# Patient Record
Sex: FEMALE | Race: WHITE | Employment: OTHER | URBAN - METROPOLITAN AREA
[De-identification: names, ages, dates, MRNs, and addresses within clinical notes are randomized per-mention and may not be internally consistent; named-entity substitution may affect disease eponyms.]

---

## 2017-04-11 ENCOUNTER — HOSPITAL ENCOUNTER (OUTPATIENT)
Dept: MRI IMAGING | Age: 72
Discharge: HOME | End: 2017-04-11
Attending: ORTHOPAEDIC SURGERY
Payer: MEDICARE

## 2017-04-11 DIAGNOSIS — M53.3 SACROILIAC PAIN: ICD-10-CM

## 2017-04-11 PROCEDURE — 72195 MRI PELVIS W/O DYE: CPT

## 2020-08-26 DIAGNOSIS — G90.09 IDIOPATHIC PERIPHERAL AUTONOMIC NEUROPATHY: Primary | ICD-10-CM

## 2020-08-26 RX ORDER — GABAPENTIN 800 MG/1
800 TABLET ORAL
COMMUNITY
End: 2020-08-26 | Stop reason: SDUPTHER

## 2020-08-27 RX ORDER — GABAPENTIN 800 MG/1
800 TABLET ORAL 3 TIMES DAILY
Qty: 270 TAB | Refills: 0 | Status: SHIPPED | OUTPATIENT
Start: 2020-08-27 | End: 2020-12-07

## 2020-11-18 VITALS
OXYGEN SATURATION: 96 % | TEMPERATURE: 96.8 F | SYSTOLIC BLOOD PRESSURE: 140 MMHG | DIASTOLIC BLOOD PRESSURE: 68 MMHG | WEIGHT: 173 LBS | HEIGHT: 64 IN | HEART RATE: 80 BPM | BODY MASS INDEX: 29.53 KG/M2

## 2020-11-18 PROBLEM — G90.09 IDIOPATHIC PERIPHERAL AUTONOMIC NEUROPATHY: Status: ACTIVE | Noted: 2020-11-18

## 2020-11-18 PROBLEM — M17.9 OSTEOARTHRITIS OF KNEE: Status: ACTIVE | Noted: 2020-11-18

## 2020-11-18 PROBLEM — I10 HYPERTENSIVE DISORDER: Status: ACTIVE | Noted: 2020-11-18

## 2020-11-18 PROBLEM — E78.1 HYPERTRIGLYCERIDEMIA: Status: ACTIVE | Noted: 2020-11-18

## 2020-11-18 PROBLEM — L40.9 PSORIASIS: Status: ACTIVE | Noted: 2020-11-18

## 2020-11-18 PROBLEM — Z95.4 HISTORY OF AORTIC VALVE REPLACEMENT WITH TISSUE GRAFT: Status: ACTIVE | Noted: 2020-11-18

## 2020-11-18 RX ORDER — FUROSEMIDE 20 MG/1
TABLET ORAL DAILY
COMMUNITY
End: 2021-09-20 | Stop reason: ALTCHOICE

## 2020-11-18 RX ORDER — PYRIDOXINE HCL (VITAMIN B6) 100 MG
100 TABLET ORAL DAILY
COMMUNITY

## 2020-11-18 RX ORDER — BISMUTH SUBSALICYLATE 262 MG
1 TABLET,CHEWABLE ORAL DAILY
COMMUNITY

## 2020-11-18 RX ORDER — GLUCOSAMINE SULFATE 1500 MG
POWDER IN PACKET (EA) ORAL DAILY
COMMUNITY
End: 2021-09-16 | Stop reason: ALTCHOICE

## 2020-11-18 RX ORDER — POTASSIUM CHLORIDE 20 MEQ/1
TABLET, EXTENDED RELEASE ORAL 2 TIMES DAILY
COMMUNITY
End: 2021-08-10

## 2020-11-18 RX ORDER — ATORVASTATIN CALCIUM 40 MG/1
40 TABLET, FILM COATED ORAL EVERY EVENING
COMMUNITY

## 2020-11-18 RX ORDER — FUROSEMIDE 40 MG/1
TABLET ORAL DAILY
COMMUNITY
End: 2021-01-13

## 2020-11-18 RX ORDER — DOXYCYCLINE 100 MG/1
100 CAPSULE ORAL 2 TIMES DAILY
COMMUNITY
End: 2021-01-13

## 2020-11-18 RX ORDER — RAMIPRIL 5 MG/1
CAPSULE ORAL DAILY
COMMUNITY
End: 2021-09-20 | Stop reason: ALTCHOICE

## 2020-11-18 RX ORDER — ALBUTEROL SULFATE 90 UG/1
AEROSOL, METERED RESPIRATORY (INHALATION)
COMMUNITY
End: 2021-08-10

## 2020-11-18 RX ORDER — TRIAMCINOLONE ACETONIDE 40 MG/ML
INJECTION, SUSPENSION INTRA-ARTICULAR; INTRAMUSCULAR ONCE
COMMUNITY
End: 2021-01-13

## 2020-11-18 RX ORDER — CARVEDILOL 25 MG/1
12.5 TABLET ORAL 2 TIMES DAILY WITH MEALS
COMMUNITY

## 2020-11-18 RX ORDER — MONTELUKAST SODIUM 4 MG/1
1 TABLET, CHEWABLE ORAL 2 TIMES DAILY
COMMUNITY
End: 2021-01-13

## 2020-11-18 RX ORDER — CLOPIDOGREL BISULFATE 75 MG/1
TABLET ORAL
COMMUNITY
End: 2021-01-13

## 2020-11-18 RX ORDER — GLUCOSAM/CHONDRO/HERB 149/HYAL 750-100 MG
1 TABLET ORAL DAILY
COMMUNITY
End: 2021-08-10

## 2021-01-11 ENCOUNTER — TELEPHONE (OUTPATIENT)
Dept: FAMILY MEDICINE CLINIC | Age: 76
End: 2021-01-11

## 2021-01-11 DIAGNOSIS — G90.09 IDIOPATHIC PERIPHERAL AUTONOMIC NEUROPATHY: ICD-10-CM

## 2021-01-11 NOTE — TELEPHONE ENCOUNTER
Pt phoned stating that she needs a refill on: 
1) gabapentin *pt has an appt w/ [facility] on 1/1202021 but she ran out of this medication on 1/10/2021*

## 2021-01-12 ENCOUNTER — TELEPHONE (OUTPATIENT)
Dept: FAMILY MEDICINE CLINIC | Age: 76
End: 2021-01-12

## 2021-01-12 RX ORDER — GABAPENTIN 800 MG/1
TABLET ORAL
Qty: 90 TAB | Refills: 0 | Status: SHIPPED | OUTPATIENT
Start: 2021-01-12 | End: 2021-02-09 | Stop reason: SDUPTHER

## 2021-01-12 NOTE — TELEPHONE ENCOUNTER
She's scheduled to see you tomorrow.  She's out of the stacie[emtom could that be called in today please

## 2021-01-13 ENCOUNTER — OFFICE VISIT (OUTPATIENT)
Dept: FAMILY MEDICINE CLINIC | Age: 76
End: 2021-01-13
Payer: MEDICARE

## 2021-01-13 VITALS
DIASTOLIC BLOOD PRESSURE: 80 MMHG | WEIGHT: 174.38 LBS | BODY MASS INDEX: 29.05 KG/M2 | HEIGHT: 65 IN | HEART RATE: 78 BPM | SYSTOLIC BLOOD PRESSURE: 140 MMHG | TEMPERATURE: 96.9 F | OXYGEN SATURATION: 96 %

## 2021-01-13 DIAGNOSIS — E66.3 OVERWEIGHT WITH BODY MASS INDEX (BMI) OF 29 TO 29.9 IN ADULT: ICD-10-CM

## 2021-01-13 DIAGNOSIS — Z23 ENCOUNTER FOR IMMUNIZATION: ICD-10-CM

## 2021-01-13 DIAGNOSIS — F17.210 CIGARETTE NICOTINE DEPENDENCE WITHOUT COMPLICATION: ICD-10-CM

## 2021-01-13 DIAGNOSIS — Z13.31 DEPRESSION SCREENING: ICD-10-CM

## 2021-01-13 DIAGNOSIS — Z53.20 PROCEDURE REFUSED: ICD-10-CM

## 2021-01-13 DIAGNOSIS — Z91.81 AT HIGH RISK FOR FALLS: ICD-10-CM

## 2021-01-13 DIAGNOSIS — A08.4 VIRAL GASTROENTERITIS: ICD-10-CM

## 2021-01-13 DIAGNOSIS — E78.2 MIXED HYPERLIPIDEMIA: ICD-10-CM

## 2021-01-13 DIAGNOSIS — G90.09 IDIOPATHIC PERIPHERAL AUTONOMIC NEUROPATHY: ICD-10-CM

## 2021-01-13 DIAGNOSIS — Z00.00 MEDICARE ANNUAL WELLNESS VISIT, SUBSEQUENT: Primary | ICD-10-CM

## 2021-01-13 DIAGNOSIS — R01.1 MURMUR: ICD-10-CM

## 2021-01-13 DIAGNOSIS — Z13.39 ALCOHOL SCREENING: ICD-10-CM

## 2021-01-13 PROCEDURE — G8427 DOCREV CUR MEDS BY ELIG CLIN: HCPCS | Performed by: NURSE PRACTITIONER

## 2021-01-13 PROCEDURE — G8754 DIAS BP LESS 90: HCPCS | Performed by: NURSE PRACTITIONER

## 2021-01-13 PROCEDURE — G8400 PT W/DXA NO RESULTS DOC: HCPCS | Performed by: NURSE PRACTITIONER

## 2021-01-13 PROCEDURE — 99214 OFFICE O/P EST MOD 30 MIN: CPT | Performed by: NURSE PRACTITIONER

## 2021-01-13 PROCEDURE — G8419 CALC BMI OUT NRM PARAM NOF/U: HCPCS | Performed by: NURSE PRACTITIONER

## 2021-01-13 PROCEDURE — G8753 SYS BP > OR = 140: HCPCS | Performed by: NURSE PRACTITIONER

## 2021-01-13 PROCEDURE — G8536 NO DOC ELDER MAL SCRN: HCPCS | Performed by: NURSE PRACTITIONER

## 2021-01-13 PROCEDURE — G8432 DEP SCR NOT DOC, RNG: HCPCS | Performed by: NURSE PRACTITIONER

## 2021-01-13 PROCEDURE — G0439 PPPS, SUBSEQ VISIT: HCPCS | Performed by: NURSE PRACTITIONER

## 2021-01-13 PROCEDURE — 1101F PT FALLS ASSESS-DOCD LE1/YR: CPT | Performed by: NURSE PRACTITIONER

## 2021-01-13 PROCEDURE — 1090F PRES/ABSN URINE INCON ASSESS: CPT | Performed by: NURSE PRACTITIONER

## 2021-01-13 NOTE — PATIENT INSTRUCTIONS
Medicare Wellness Visit, Female The best way to live healthy is to have a lifestyle where you eat a well-balanced diet, exercise regularly, limit alcohol use, and quit all forms of tobacco/nicotine, if applicable. Regular preventive services are another way to keep healthy. Preventive services (vaccines, screening tests, monitoring & exams) can help personalize your care plan, which helps you manage your own care. Screening tests can find health problems at the earliest stages, when they are easiest to treat. [facility] follows the current, evidence-based guidelines published by the [facility] ([facility]) when recommending preventive services for our patients. Because we follow these guidelines, sometimes recommendations change over time as research supports it. (For example, mammograms used to be recommended annually. Even though Medicare will still pay for an annual mammogram, the newer guidelines recommend a mammogram every two years for women of average risk). Of course, you and your doctor may decide to screen more often for some diseases, based on your risk and your co-morbidities (chronic disease you are already diagnosed with). Preventive services for you include: - Medicare offers their members a free annual wellness visit, which is time for you and your primary care provider to discuss and plan for your preventive service needs. Take advantage of this benefit every year! 
-All adults over the age of 72 should receive the recommended pneumonia vaccines. Current USPSTF guidelines recommend a series of two vaccines for the best pneumonia protection.  
-All adults should have a flu vaccine yearly and a tetanus vaccine every 10 years.  
-All adults age 48 and older should receive the shingles vaccines (series of two vaccines). -All adults age 38-68 who are overweight should have a diabetes screening test once every three years. -All adults born between [birth year] and 1965 should be screened once for Hepatitis C. 
-Other screening tests and preventive services for persons with diabetes include: an eye exam to screen for diabetic retinopathy, a kidney function test, a foot exam, and stricter control over your cholesterol.  
-Cardiovascular screening for adults with routine risk involves an electrocardiogram (ECG) at intervals determined by your doctor.  
-Colorectal cancer screenings should be done for adults age 54-65 with no increased risk factors for colorectal cancer. There are a number of acceptable methods of screening for this type of cancer. Each test has its own benefits and drawbacks. Discuss with your doctor what is most appropriate for you during your annual wellness visit. The different tests include: colonoscopy (considered the best screening method), a fecal occult blood test, a fecal DNA test, and sigmoidoscopy. 
 
-A bone mass density test is recommended when a woman turns 65 to screen for osteoporosis. This test is only recommended one time, as a screening. Some providers will use this same test as a disease monitoring tool if you already have osteoporosis. -Breast cancer screenings are recommended every other year for women of normal risk, age 54-69. 
-Cervical cancer screenings for women over age 72 are only recommended with certain risk factors. Here is a list of your current Health Maintenance items (your personalized list of preventive services) with a due date: 
Health Maintenance Due Topic Date Due  Cholesterol Test   01/25/1955  DTaP/Tdap/Td  (1 - Tdap) 01/25/1965  Shingles Vaccine (1 of 2) 01/25/1994  Glaucoma Screening   01/25/2009  Bone Mineral Density   01/25/2009 [place] Self Annual Well Visit  04/20/2020

## 2021-01-13 NOTE — PROGRESS NOTES
Medicare Wellness Exam:    Chief Complaint   Patient presents with    Annual Wellness Visit     she is a 67y.o. year old female who presents for evaluation for their Medicare Wellness Visit. Patient presents for Medicare wellness, fasting labs, and management of hyperlipidemia and neuropathy. She also has an acute complaint today. Medications reviewed, taking as prescribed with no known side effects. Follows with cardiology for management of hypertension. Atorvastatin daily in am for hyperlipidemia. Limits fat/cholesterol. Does not exercise regularly, limited due to neuropathy. Neuropathy is well controlled with Gabapentin daily. Reports two falls in the past year. One occurred when she tripped over a strap on her cane. One occurred when her legs fell from under her. Uses cane for ambulation. Continues to smoke 3-4 cigarettes per day. Motivated to quit on her own. She has been successful in cutting back over the past year. Patient presents with complaint of diarrhea daily for the past 4-5 days. Stools are described as loose, not watery. Denies fever, rash, and respiratory symptoms. Denies bloating, abdominal pain, and blood/mucous in stool. Evaluation to date: none  Treatment to date: Imodium is not helping  Relevant PMH: No pertinent additional PMH. Fall Screen is completed and assessed=yes  Depression Screen is completed and assessed=yes  Medication list reviewed and adjusted for accuracy=yes  Immunizations reviewed and updated=yes  Health/Preventative Screenings reviewed and updated=yes  ADL Functions reviewed=yes  MiniCog score= 4/5 (2points for clock, 3/3 for recall)  See scanned medicare wellness documents for full details.      Patient Active Problem List    Diagnosis    Aortic heart murmur    History of aortic valve replacement with tissue graft    Hypertensive disorder    Hypertriglyceridemia    Idiopathic peripheral autonomic neuropathy    Osteoarthritis of knee    Psoriasis Reviewed PmHx, RxHx, FmHx, SocHx, AllgHx and updated and dated in the chart. Review of Systems   Constitutional: Negative for chills, fever and weight loss. HENT: Negative for congestion, ear pain, hearing loss, sinus pain and sore throat. Denies difficulty swallowing. Eyes: Negative for blurred vision. Respiratory: Negative for cough, shortness of breath and wheezing. Cardiovascular: Positive for leg swelling (Lasix prn). Negative for chest pain, palpitations and claudication. Gastrointestinal: Positive for diarrhea. Negative for abdominal pain, constipation and heartburn. Genitourinary: Negative for dysuria. Musculoskeletal: Positive for joint pain. Negative for myalgias. Neurological: Positive for tingling (peripheral neuropathy). Negative for dizziness, weakness and headaches. Psychiatric/Behavioral: Negative for depression. The patient is not nervous/anxious. Objective:     Vitals:    01/13/21 1026   BP: (!) 140/80   Pulse: 78   Temp: 96.9 °F (36.1 °C)   TempSrc: Temporal   SpO2: 96%   Weight: 174 lb 6 oz (79.1 kg)   Height: 5' 5\" (1.651 m)     Physical Exam  Vitals signs and nursing note reviewed. Constitutional:       General: She is not in acute distress. Appearance: Normal appearance. She is overweight. HENT:      Head: Normocephalic. Eyes:      Extraocular Movements: Extraocular movements intact. Neck:      Musculoskeletal: Neck supple. Thyroid: No thyroid mass, thyromegaly or thyroid tenderness. Cardiovascular:      Rate and Rhythm: Normal rate and regular rhythm. Heart sounds: Murmur present. Pulmonary:      Effort: Pulmonary effort is normal.      Breath sounds: Normal breath sounds. Abdominal:      General: Bowel sounds are normal. There is no distension. Palpations: Abdomen is soft. Tenderness: There is no abdominal tenderness. Musculoskeletal: Normal range of motion. Right lower leg: No edema. Left lower leg: No edema. Lymphadenopathy:      Cervical: No cervical adenopathy. Upper Body:      Right upper body: No supraclavicular adenopathy. Left upper body: No supraclavicular adenopathy. Skin:     General: Skin is warm and dry. Neurological:      Mental Status: She is alert and oriented to person, place, and time. Psychiatric:         Mood and Affect: Mood normal.         Behavior: Behavior normal.          Assessment/ Plan:   Diagnoses and all orders for this visit:    1. Medicare annual wellness visit, subsequent  [facility] exam completed as documented. 2. Depression screening  Negative depression screening. 3. Alcohol screening  Low risk for alcohol misuse. 4. At high risk for falls  Ambulates with cane. Declines PT and home health referrals. 5. Encounter for immunization  Advised to receive Shingrix vaccine from pharmacy and cautioned there may be an out-of-pocket expense. 6. Overweight with body mass index (BMI) of 29 to 29.9 in adult  Increase exercise as tolerated, avoid sitting for prolonged. Of time. Eat a healthy diet. 7. Cigarette nicotine dependence without complication  Praised for successfully cutting back on cigarette use. Call if we can be of further assistance. 8. Procedure refused  Declines further mammogram, bone density, and colonoscopy screening. 9. Mixed hyperlipidemia  Checking annual labs as noted. Take statin daily in the evening. Increase exercise as tolerated and continue to limit fat and cholesterol in diet. -     CBC WITH AUTOMATED DIFF  -     LIPID PANEL  -     METABOLIC PANEL, COMPREHENSIVE    10. Idiopathic peripheral autonomic neuropathy   checked, no misuse or abuse noted. Last refilled #90 on 12/7/2020. Well controlled with gabapentin daily. 11. Viral gastroenteritis  Likely viral in nature at this point. Recommend bland diet and plenty of fluids. Okay to take Imodium as needed.   Call if symptoms persist beyond 10 to 14 days. 15. Murmur  Follows with cardiology following aortic valve replacement.       -Pain evaluation performed in office  -Cognitive Screen performed in office  -Depression Screen, Fall risks (by up and go test)  and ADL functionality were addressed  -Medication list updated and reviewed for any changes   -A comprehensive review of medical issues and a plan was formulated  -End of life planning was addressed with pt   -Health Screenings for preventions were addressed and a plan was formulated  -Shingles Vaccine was recommended  -Discussed with patient cancer risk factors and appropriate screenings for age  -Patient evaluated for colonoscopy and referred if needed per screeing criteria  -Labs from previous visits were discussed with patient   -Discussed with patient diet and exercise and formulated a plan as needed  -An Advanced care plan was developed with the patient.  -Alcohol screening performed and was negative    -  Follow-up and Dispositions    · Return in about 6 months (around 7/13/2021) for follow up neuropathy, nonfasting, VV ok. I have discussed the diagnosis with the patient and the intended plan as seen in the above orders. The patient understands and agrees with the plan. The patient has received an after-visit summary and questions were answered concerning future plans.      Medication Side Effects and Warnings were discussed with patien  Patient Labs were reviewed and or requested  Patient Past Records were reviewed and or requested        VODECLIC FNP-C

## 2021-01-14 LAB
ALBUMIN SERPL-MCNC: 3.8 G/DL (ref 3.7–4.7)
ALBUMIN/GLOB SERPL: 1.6 {RATIO} (ref 1.2–2.2)
ALP SERPL-CCNC: 54 IU/L (ref 39–117)
ALT SERPL-CCNC: 16 IU/L (ref 0–32)
AST SERPL-CCNC: 26 IU/L (ref 0–40)
BASOPHILS # BLD AUTO: 0 X10E3/UL (ref 0–0.2)
BASOPHILS NFR BLD AUTO: 0 %
BILIRUB SERPL-MCNC: 0.4 MG/DL (ref 0–1.2)
BUN SERPL-MCNC: 11 MG/DL (ref 8–27)
BUN/CREAT SERPL: 16 (ref 12–28)
CALCIUM SERPL-MCNC: 8.6 MG/DL (ref 8.7–10.3)
CHLORIDE SERPL-SCNC: 100 MMOL/L (ref 96–106)
CHOLEST SERPL-MCNC: 156 MG/DL (ref 100–199)
CO2 SERPL-SCNC: 27 MMOL/L (ref 20–29)
CREAT SERPL-MCNC: 0.67 MG/DL (ref 0.57–1)
EOSINOPHIL # BLD AUTO: 0.2 X10E3/UL (ref 0–0.4)
EOSINOPHIL NFR BLD AUTO: 2 %
ERYTHROCYTE [DISTWIDTH] IN BLOOD BY AUTOMATED COUNT: 13.1 % (ref 11.7–15.4)
GLOBULIN SER CALC-MCNC: 2.4 G/DL (ref 1.5–4.5)
GLUCOSE SERPL-MCNC: 76 MG/DL (ref 65–99)
HCT VFR BLD AUTO: 43 % (ref 34–46.6)
HDLC SERPL-MCNC: 30 MG/DL
HGB BLD-MCNC: 14.5 G/DL (ref 11.1–15.9)
IMM GRANULOCYTES # BLD AUTO: 0 X10E3/UL (ref 0–0.1)
IMM GRANULOCYTES NFR BLD AUTO: 0 %
LDLC SERPL CALC-MCNC: 83 MG/DL (ref 0–99)
LYMPHOCYTES # BLD AUTO: 2.5 X10E3/UL (ref 0.7–3.1)
LYMPHOCYTES NFR BLD AUTO: 27 %
MCH RBC QN AUTO: 31.5 PG (ref 26.6–33)
MCHC RBC AUTO-ENTMCNC: 33.7 G/DL (ref 31.5–35.7)
MCV RBC AUTO: 93 FL (ref 79–97)
MONOCYTES # BLD AUTO: 0.7 X10E3/UL (ref 0.1–0.9)
MONOCYTES NFR BLD AUTO: 8 %
NEUTROPHILS # BLD AUTO: 5.6 X10E3/UL (ref 1.4–7)
NEUTROPHILS NFR BLD AUTO: 63 %
PLATELET # BLD AUTO: 148 X10E3/UL (ref 150–450)
POTASSIUM SERPL-SCNC: 4 MMOL/L (ref 3.5–5.2)
PROT SERPL-MCNC: 6.2 G/DL (ref 6–8.5)
RBC # BLD AUTO: 4.61 X10E6/UL (ref 3.77–5.28)
SODIUM SERPL-SCNC: 139 MMOL/L (ref 134–144)
TRIGL SERPL-MCNC: 255 MG/DL (ref 0–149)
VLDLC SERPL CALC-MCNC: 43 MG/DL (ref 5–40)
WBC # BLD AUTO: 9 X10E3/UL (ref 3.4–10.8)

## 2021-01-14 NOTE — PROGRESS NOTES
No blood cell abnormalities including anemia. Normal glucose, kidney and liver function. Total and LDL cholesterol remain below goal but HDL or good cholesterol is low and triglycerides are elevated. I would like her to have her thyroid function checked with her next lab draw. If this is with cardiology, please ask them to check it and send me the results.

## 2021-02-08 ENCOUNTER — TELEPHONE (OUTPATIENT)
Dept: FAMILY MEDICINE CLINIC | Age: 76
End: 2021-02-08

## 2021-02-08 DIAGNOSIS — G90.09 IDIOPATHIC PERIPHERAL AUTONOMIC NEUROPATHY: ICD-10-CM

## 2021-02-09 RX ORDER — GABAPENTIN 800 MG/1
TABLET ORAL
Qty: 90 TAB | Refills: 0 | Status: SHIPPED | OUTPATIENT
Start: 2021-02-09 | End: 2021-03-17 | Stop reason: SDUPTHER

## 2021-03-17 DIAGNOSIS — G90.09 IDIOPATHIC PERIPHERAL AUTONOMIC NEUROPATHY: ICD-10-CM

## 2021-03-18 RX ORDER — GABAPENTIN 800 MG/1
TABLET ORAL
Qty: 270 TAB | Refills: 0 | Status: SHIPPED | OUTPATIENT
Start: 2021-03-18 | End: 2021-06-22

## 2021-06-21 DIAGNOSIS — G90.09 IDIOPATHIC PERIPHERAL AUTONOMIC NEUROPATHY: ICD-10-CM

## 2021-06-22 RX ORDER — GABAPENTIN 800 MG/1
TABLET ORAL
Qty: 270 TABLET | Refills: 0 | Status: SHIPPED | OUTPATIENT
Start: 2021-06-22 | End: 2021-09-16 | Stop reason: DRUGHIGH

## 2021-08-10 ENCOUNTER — OFFICE VISIT (OUTPATIENT)
Dept: FAMILY MEDICINE CLINIC | Age: 76
End: 2021-08-10
Payer: MEDICARE

## 2021-08-10 VITALS
TEMPERATURE: 97.5 F | DIASTOLIC BLOOD PRESSURE: 88 MMHG | HEIGHT: 65 IN | HEART RATE: 84 BPM | OXYGEN SATURATION: 97 % | WEIGHT: 182 LBS | RESPIRATION RATE: 16 BRPM | SYSTOLIC BLOOD PRESSURE: 148 MMHG | BODY MASS INDEX: 30.32 KG/M2

## 2021-08-10 DIAGNOSIS — I10 ESSENTIAL HYPERTENSION: ICD-10-CM

## 2021-08-10 DIAGNOSIS — M89.8X1 PAIN OF RIGHT SCAPULA: Primary | ICD-10-CM

## 2021-08-10 PROCEDURE — G8427 DOCREV CUR MEDS BY ELIG CLIN: HCPCS | Performed by: NURSE PRACTITIONER

## 2021-08-10 PROCEDURE — G8432 DEP SCR NOT DOC, RNG: HCPCS | Performed by: NURSE PRACTITIONER

## 2021-08-10 PROCEDURE — G8536 NO DOC ELDER MAL SCRN: HCPCS | Performed by: NURSE PRACTITIONER

## 2021-08-10 PROCEDURE — G8754 DIAS BP LESS 90: HCPCS | Performed by: NURSE PRACTITIONER

## 2021-08-10 PROCEDURE — 1090F PRES/ABSN URINE INCON ASSESS: CPT | Performed by: NURSE PRACTITIONER

## 2021-08-10 PROCEDURE — 99214 OFFICE O/P EST MOD 30 MIN: CPT | Performed by: NURSE PRACTITIONER

## 2021-08-10 PROCEDURE — G8400 PT W/DXA NO RESULTS DOC: HCPCS | Performed by: NURSE PRACTITIONER

## 2021-08-10 PROCEDURE — G8753 SYS BP > OR = 140: HCPCS | Performed by: NURSE PRACTITIONER

## 2021-08-10 PROCEDURE — G8417 CALC BMI ABV UP PARAM F/U: HCPCS | Performed by: NURSE PRACTITIONER

## 2021-08-10 PROCEDURE — 1101F PT FALLS ASSESS-DOCD LE1/YR: CPT | Performed by: NURSE PRACTITIONER

## 2021-08-10 RX ORDER — DICLOFENAC SODIUM 10 MG/G
GEL TOPICAL
COMMUNITY
End: 2021-12-22

## 2021-08-10 RX ORDER — MONTELUKAST SODIUM 4 MG/1
TABLET, CHEWABLE ORAL
COMMUNITY
Start: 2021-06-14

## 2021-08-10 RX ORDER — NAPROXEN 500 MG/1
TABLET ORAL
Qty: 60 TABLET | Refills: 0 | Status: SHIPPED | OUTPATIENT
Start: 2021-08-10 | End: 2021-09-16 | Stop reason: ALTCHOICE

## 2021-08-10 NOTE — PROGRESS NOTES
Subjective  Chief Complaint   Patient presents with    Other     right [facility] pain, [facility]     HPI:  Nori Schultz is a 67 y.o. female. Patient presents with complaint of right shoulder pain since fall early June. Pain is described as sharp localized pain that occurs 6 times per day with movement. Denies weakness, numbness, and tingling. Denies neck pain. Evaluation to date: 6/22/2021 [facility], xray reported to be normal, diagnosed with pinched nerve and discharged home with muscle relaxor  Aggravating factors: extension, spinal twisting to right  Reliving factors: muscle relaxor  Treatment to date: Voltaran gel, muscle relaxor, topical pain reliever patch, heat, icy hot patch  Relevant PMH: No pertinent additional PMH. Reported home BP readings 140/60. Follows with cardiology for management. Next appointment scheduled 9/2021.     Past Medical History:   Diagnosis Date    Aortic stenosis     Hypercholesterolemia     Neuropathy     Idiopathic peripheral    Skin problem      Family History   Problem Relation Age of Onset    Thyroid Disease Daughter     Kidney Disease Son         kidney stone    Stroke Mother     Heart Attack Father     Heart Attack Brother     Stroke Maternal Grandmother      Social History     Socioeconomic History    Marital status:      Spouse name: Not on file    Number of children: Not on file    Years of education: Not on file    Highest education level: Not on file   Occupational History    Not on file   Tobacco Use    Smoking status: Current Every Day Smoker     Packs/day: 0.25     Years: 50.00     Pack years: 12.50     Types: Cigarettes    Smokeless tobacco: Never Used    Tobacco comment: 4-5 cigs a day currently, never smoked more than 1/2 PPD   Vaping Use    Vaping Use: Never used   Substance and Sexual Activity    Alcohol use: Never    Drug use: Never    Sexual activity: Not on file   Other Topics Concern    Not on file   Social History Narrative    Not on file     Social Determinants of Health     Financial Resource Strain:     Difficulty of Paying Living Expenses:    Food Insecurity:     Worried About Running Out of Food in the Last Year:     [address] in the Last Year:    Transportation Needs:     Lack of Transportation (Medical):  Lack of Transportation (Non-Medical):    Physical Activity:     Days of Exercise per Week:     Minutes of Exercise per Session:    Stress:     Feeling of Stress :    Social Connections:     Frequency of Communication with Friends and Family:     Frequency of Social Gatherings with Friends and Family:     Attends Zoroastrianism Services:     Active Member of Clubs or Organizations:     Attends Club or Organization Meetings:     Marital Status:    Intimate Partner Violence:     Fear of Current or Ex-Partner:     Emotionally Abused:     Physically Abused:     Sexually Abused:      Current Outpatient Medications on File Prior to Visit   Medication Sig Dispense Refill    colestipoL (COLESTID) 1 gram tablet TAKE 1 TO 2 TABLETS BY MOUTH AS NEEDED FOR DIARRHEA      gabapentin (NEURONTIN) 800 mg tablet TAKE 1 TABLET BY MOUTH THREE TIMES DAILY AS NEEDED 270 Tablet 0    atorvastatin (LIPITOR) 40 mg tablet Take  by mouth daily.  carvediloL (COREG) 25 mg tablet Take 25 mg by mouth two (2) times daily (with meals).  furosemide (LASIX) 20 mg tablet Take  by mouth daily.  multivitamin (ONE A DAY) tablet Take 1 Tab by mouth daily.  pyridoxine, vitamin B6, (VITAMIN B-6) 100 mg tablet Take 100 mg by mouth daily.  ramipriL (ALTACE) 5 mg capsule Take  by mouth daily.  cholecalciferol (Vitamin D3) 25 mcg (1,000 unit) cap Take  by mouth daily.       diclofenac (VOLTAREN) 1 % gel diclofenac 1 % topical gel   APPLY 2 GRAMS TO THE AFFECTED AREAS FOUR TIMES DAILY      [DISCONTINUED] albuterol (PROVENTIL HFA, VENTOLIN HFA, PROAIR HFA) 90 mcg/actuation inhaler Take  by inhalation. (Patient not taking: Reported on 8/10/2021)      [DISCONTINUED] omega 3-DHA-EPA-fish oil 1,000 mg (120 mg-180 mg) capsule Take 1 Cap by mouth daily. (Patient not taking: Reported on 8/10/2021)      [DISCONTINUED] potassium chloride (Klor-Con M20) 20 mEq tablet Take  by mouth two (2) times a day. (Patient not taking: Reported on 8/10/2021)       No current facility-administered medications on file prior to visit. Allergies   Allergen Reactions    Crestor [Rosuvastatin] Myalgia    Dexlansoprazole Other (comments)    Keflex [Cephalexin] Diarrhea    Lipitor [Atorvastatin] Myalgia    Morphine Unknown (comments)    Niaspan [Niacin] Other (comments)     flushing    Sulfa (Sulfonamide Antibiotics) Swelling     Facial swelling    Tricor [Fenofibrate Micronized] Myalgia     ROS  See HPI for pertinent ROS. Objective  Visit Vitals  BP (!) 148/88 (BP 1 Location: Left upper arm, BP Patient Position: Sitting)   Pulse 84   Temp 97.5 °F (36.4 °C) (Temporal)   Resp 16   Ht 5' 5\" (1.651 m)   Wt 182 lb (82.6 kg)   SpO2 97%   BMI 30.29 kg/m²       Physical Exam  Vitals and nursing note reviewed. Constitutional:       General: She is not in acute distress. Appearance: Normal appearance. HENT:      Head: Normocephalic. Eyes:      Extraocular Movements: Extraocular movements intact. Cardiovascular:      Rate and Rhythm: Normal rate and regular rhythm. Heart sounds: Normal heart sounds. Pulmonary:      Effort: Pulmonary effort is normal.      Breath sounds: Normal breath sounds. Musculoskeletal:      Right shoulder: Tenderness present. No deformity or bony tenderness. Decreased range of motion (verbalized discomfort with extension only). Normal strength. Arms:       Right lower leg: No edema. Left lower leg: No edema. Skin:     General: Skin is warm and dry. Neurological:      Mental Status: She is alert and oriented to person, place, and time.    Psychiatric:         Mood and Affect: Mood normal.         Behavior: Behavior normal.          Assessment & Plan      ICD-10-CM ICD-9-CM    1. Pain of right scapula  M89.8X1 733.90 REFERRAL TO ORTHOPEDICS      naproxen (NAPROSYN) 500 mg tablet   2. Essential hypertension  I10 401.9      Diagnoses and all orders for this visit:    1. Pain of right scapula  Medication as ordered. Also recommend ongoing heat and ice. If symptoms do not improve with consistent use of anti-inflammatory medication, follow-up with Ortho.  -     REFERRAL TO ORTHOPEDICS  -     naproxen (NAPROSYN) 500 mg tablet; Take 1 tablet twice daily with food for 7-10 days, then as needed. 2. Essential hypertension  BP is borderline in the office today and below goal on reported home readings. Follow-up with cardiology as scheduled. Follow-up and Dispositions    · Return in about 5 months (around 1/10/2022) for MCW, fasting labs, follow up, chronic conditions/meds.            Breanna Pollack NP

## 2021-08-10 NOTE — PROGRESS NOTES
Chief Complaint   Patient presents with    Other     right shoulder pain, went to [facility] on [facility]   1. Have you been to the ER, urgent care clinic since your last visit? Hospitalized since your last visit? Yes When: [facility], [facility] pain/fall/ july 2021    2. Have you seen or consulted any other health care providers outside of the [address] [place] [address] since your last visit? Include any pap smears or colon screening.  No

## 2021-09-16 ENCOUNTER — OFFICE VISIT (OUTPATIENT)
Dept: FAMILY MEDICINE CLINIC | Age: 76
End: 2021-09-16
Payer: MEDICARE

## 2021-09-16 ENCOUNTER — TELEPHONE (OUTPATIENT)
Dept: FAMILY MEDICINE CLINIC | Age: 76
End: 2021-09-16

## 2021-09-16 VITALS
OXYGEN SATURATION: 96 % | TEMPERATURE: 96.9 F | SYSTOLIC BLOOD PRESSURE: 132 MMHG | HEIGHT: 65 IN | BODY MASS INDEX: 29.16 KG/M2 | WEIGHT: 175 LBS | HEART RATE: 87 BPM | RESPIRATION RATE: 16 BRPM | DIASTOLIC BLOOD PRESSURE: 84 MMHG

## 2021-09-16 DIAGNOSIS — I10 ESSENTIAL HYPERTENSION: ICD-10-CM

## 2021-09-16 DIAGNOSIS — R60.0 LOWER EXTREMITY EDEMA: ICD-10-CM

## 2021-09-16 DIAGNOSIS — N17.9 ACUTE KIDNEY INJURY (HCC): ICD-10-CM

## 2021-09-16 DIAGNOSIS — E78.1 HYPERTRIGLYCERIDEMIA: ICD-10-CM

## 2021-09-16 DIAGNOSIS — G90.09 IDIOPATHIC PERIPHERAL AUTONOMIC NEUROPATHY: ICD-10-CM

## 2021-09-16 DIAGNOSIS — Z91.81 RISK FOR FALLS: ICD-10-CM

## 2021-09-16 DIAGNOSIS — Z09 HOSPITAL DISCHARGE FOLLOW-UP: ICD-10-CM

## 2021-09-16 DIAGNOSIS — N39.0 URINARY TRACT INFECTION WITHOUT HEMATURIA, SITE UNSPECIFIED: Primary | ICD-10-CM

## 2021-09-16 DIAGNOSIS — A41.9 SEPSIS, DUE TO UNSPECIFIED ORGANISM, UNSPECIFIED WHETHER ACUTE ORGAN DYSFUNCTION PRESENT (HCC): ICD-10-CM

## 2021-09-16 PROCEDURE — G8752 SYS BP LESS 140: HCPCS | Performed by: NURSE PRACTITIONER

## 2021-09-16 PROCEDURE — 1090F PRES/ABSN URINE INCON ASSESS: CPT | Performed by: NURSE PRACTITIONER

## 2021-09-16 PROCEDURE — G8754 DIAS BP LESS 90: HCPCS | Performed by: NURSE PRACTITIONER

## 2021-09-16 PROCEDURE — G8400 PT W/DXA NO RESULTS DOC: HCPCS | Performed by: NURSE PRACTITIONER

## 2021-09-16 PROCEDURE — 1101F PT FALLS ASSESS-DOCD LE1/YR: CPT | Performed by: NURSE PRACTITIONER

## 2021-09-16 PROCEDURE — G8536 NO DOC ELDER MAL SCRN: HCPCS | Performed by: NURSE PRACTITIONER

## 2021-09-16 PROCEDURE — G8427 DOCREV CUR MEDS BY ELIG CLIN: HCPCS | Performed by: NURSE PRACTITIONER

## 2021-09-16 PROCEDURE — G8432 DEP SCR NOT DOC, RNG: HCPCS | Performed by: NURSE PRACTITIONER

## 2021-09-16 PROCEDURE — G8417 CALC BMI ABV UP PARAM F/U: HCPCS | Performed by: NURSE PRACTITIONER

## 2021-09-16 PROCEDURE — 99214 OFFICE O/P EST MOD 30 MIN: CPT | Performed by: NURSE PRACTITIONER

## 2021-09-16 RX ORDER — SAME BUTANEDISULFONATE/BETAINE 400-600 MG
250 POWDER IN PACKET (EA) ORAL 2 TIMES DAILY
COMMUNITY

## 2021-09-16 RX ORDER — BUDESONIDE 3 MG/1
3 CAPSULE, COATED PELLETS ORAL 3 TIMES DAILY
COMMUNITY
End: 2021-12-22

## 2021-09-16 RX ORDER — GABAPENTIN 300 MG/1
CAPSULE ORAL
Qty: 90 CAPSULE | Refills: 0 | Status: SHIPPED | OUTPATIENT
Start: 2021-09-16 | End: 2021-10-14

## 2021-09-16 NOTE — PROGRESS NOTES
Chief Complaint   Patient presents with   [facility] Follow Up     [facility], UTI, sepsis 08/29/2021   1. Have you been to the ER, urgent care clinic since your last visit? Hospitalized since your last visit? Yes When: [facility] Aug 2021, UTI/sepsis    2. Have you seen or consulted any other health care providers outside of the [address] [place] [address] since your last visit? Include any pap smears or colon screening.  No

## 2021-09-16 NOTE — TELEPHONE ENCOUNTER
Patient would like a Right knee injection, but Dr. Josias Moreno only has same day slots available for the next several weeks. The daughter is asking if we can get her mother in sooner rather than later for this injection. Please look at your schedule for a few options where we can maybe use a 30-minute same day slot for her. Thanks.

## 2021-09-16 NOTE — PROGRESS NOTES
Subjective  Chief Complaint   Patient presents with   [facility] Follow Up     [facility], UTI, sepsis 08/29/2021     HPI:  Sonya Turk is a 67 y.o. female. This patient is here for hospital follow up. Limited discharge summary reviewed today by me. HPI obtained from patient and daughter. Admission dates: 8/29-9/5/2021 at [address] [place] [address] to [facility] by EMS after family found her unresponsive in recliner at home for an unknown amount of time. Admitted for concern for stroke, all testing negative. Diagnosed with UTI, sepsis, and acute kidney injury. Admitted to ICU and required vasoconstrictive meds for low BP. Treated with 2 IV antibiotics and fluids. Admission diagnosis: UTI, sepsis, and acute kidney injury    Treatment: as above    Specialist follow up appointments: PCP today for BMP    Update: Since discharge patient denies any fevers, shortness of breath, and constipation. Reports ongoing lower extremity edema and diarrhea. Medication lists reconciled, home meds have been resumed with no questions. Currently off Lasix, previously taking prn edema. Ramipril was discontinued at discharge, she is not checking home BP readings. Lasix and Ramipril were stopped for low kidney function per patients discharge paperwork. Diarrhea has been ongoing since prior to discharge, follows with GI for colitis and Colestipol and Budesonide. Next GI appointment 9/22/2021. Gabepentin was stopped while hospitalized. Family is currently administering 800mg every other night for uncontrolled neuropathy. Follows with home health- PT/OT and nursing.        Past Medical History:   Diagnosis Date    Aortic stenosis     Hypercholesterolemia     Neuropathy     Idiopathic peripheral    Skin problem      Family History   Problem Relation Age of Onset    Thyroid Disease Daughter     Kidney Disease Son         kidney stone    Stroke Mother     Heart Attack Father     Heart Attack Brother     Stroke Maternal Grandmother Social History     Socioeconomic History    Marital status:      Spouse name: Not on file    Number of children: Not on file    Years of education: Not on file    Highest education level: Not on file   Occupational History    Not on file   Tobacco Use    Smoking status: Current Every Day Smoker     Packs/day: 0.25     Years: 50.00     Pack years: 12.50     Types: Cigarettes    Smokeless tobacco: Never Used    Tobacco comment: 4-5 cigs a day currently, never smoked more than 1/2 PPD   Vaping Use    Vaping Use: Never used   Substance and Sexual Activity    Alcohol use: Never    Drug use: Never    Sexual activity: Not on file   Other Topics Concern    Not on file   Social History Narrative    Not on file     Social Determinants of Health     Financial Resource Strain:     Difficulty of Paying Living Expenses:    Food Insecurity:     Worried About [address] in the Last Year:     [address] [employer] in the Last Year:    Transportation Needs:     Lack of Transportation (Medical):  Lack of Transportation (Non-Medical):    Physical Activity:     Days of Exercise per Week:     Minutes of Exercise per Session:    Stress:     Feeling of Stress :    Social Connections:     Frequency of Communication with Friends and Family:     Frequency of Social Gatherings with Friends and Family:     Attends Christian Services:     Active Member of Clubs or Organizations:     Attends Club or Organization Meetings:     Marital Status:    Intimate Partner Violence:     Fear of Current or Ex-Partner:     Emotionally Abused:     Physically Abused:     Sexually Abused:      Current Outpatient Medications on File Prior to Visit   Medication Sig Dispense Refill    Saccharomyces boulardii (Florastor) 250 mg capsule Take 250 mg by mouth two (2) times a day.  budesonide (ENTOCORT EC) 3 mg capsule Take 3 mg by mouth three (3) times daily.       diclofenac (VOLTAREN) 1 % gel diclofenac 1 % topical gel   APPLY 2 GRAMS TO THE AFFECTED AREAS FOUR TIMES DAILY      colestipoL (COLESTID) 1 gram tablet TAKE 1 TO 2 TABLETS BY MOUTH AS NEEDED FOR DIARRHEA      atorvastatin (LIPITOR) 40 mg tablet Take  by mouth daily.  carvediloL (COREG) 25 mg tablet Take 12.5 mg by mouth two (2) times daily (with meals).  multivitamin (ONE A DAY) tablet Take 1 Tab by mouth daily.  pyridoxine, vitamin B6, (VITAMIN B-6) 100 mg tablet Take 100 mg by mouth daily.  [DISCONTINUED] naproxen (NAPROSYN) 500 mg tablet Take 1 tablet twice daily with food for 7-10 days, then as needed. (Patient not taking: Reported on 9/16/2021) 60 Tablet 0    [DISCONTINUED] gabapentin (NEURONTIN) 800 mg tablet TAKE 1 TABLET BY MOUTH THREE TIMES DAILY AS NEEDED (Patient not taking: Reported on 9/16/2021) 270 Tablet 0    furosemide (LASIX) 20 mg tablet Take  by mouth daily. (Patient not taking: Reported on 9/16/2021)      ramipriL (ALTACE) 5 mg capsule Take  by mouth daily. (Patient not taking: Reported on 9/16/2021)      [DISCONTINUED] cholecalciferol (Vitamin D3) 25 mcg (1,000 unit) cap Take  by mouth daily. (Patient not taking: Reported on 9/16/2021)       No current facility-administered medications on file prior to visit. Allergies   Allergen Reactions    Crestor [Rosuvastatin] Myalgia    Dexlansoprazole Other (comments)    Keflex [Cephalexin] Diarrhea    Lipitor [Atorvastatin] Myalgia    Morphine Unknown (comments)    Niaspan [Niacin] Other (comments)     flushing    Sulfa (Sulfonamide Antibiotics) Swelling     Facial swelling    Tricor [Fenofibrate Micronized] Myalgia     ROS  See HPI for pertinent ROS. Objective  Visit Vitals  /84 (BP 1 Location: Left upper arm, BP Patient Position: Sitting)   Pulse 87   Temp 96.9 °F (36.1 °C) (Temporal)   Resp 16   Ht 5' 5\" (1.651 m)   Wt 175 lb (79.4 kg)   SpO2 96%   BMI 29.12 kg/m²       Physical Exam  Vitals and nursing note reviewed.    Constitutional: General: She is not in acute distress. Appearance: Normal appearance. HENT:      Head: Normocephalic. Eyes:      Extraocular Movements: Extraocular movements intact. Cardiovascular:      Rate and Rhythm: Normal rate and regular rhythm. Heart sounds: Normal heart sounds. Pulmonary:      Effort: Pulmonary effort is normal.      Breath sounds: Normal breath sounds. Musculoskeletal:         General: Normal range of motion. Right lower leg: Edema (nonpitting 1+) present. Left lower leg: Edema (nonpitting 1+) present. Skin:     General: Skin is warm and dry. Neurological:      Mental Status: She is alert and oriented to person, place, and time. Psychiatric:         Mood and Affect: Mood normal.         Behavior: Behavior normal.          Assessment & Plan      ICD-10-CM ICD-9-CM    1. Urinary tract infection without hematuria, site unspecified  N39.0 599.0    2. Sepsis, due to unspecified organism, unspecified whether acute organ dysfunction present ([facility] [address]  A41.9 038.9      995.91    3. Acute kidney injury ([address]  V48.4 985.2 METABOLIC PANEL, BASIC   4. Lower extremity edema  R60.0 782.3    5. Hypertriglyceridemia  E78.1 272.1 TSH RFX ON ABNORMAL TO FREE T4   6. Idiopathic peripheral autonomic neuropathy  G90.09 337.00 gabapentin (NEURONTIN) 300 mg capsule   7. Risk for falls  Z91.81 V15.88    8. Essential hypertension  I10 401.9    9. Hospital discharge follow-up  Z09 V67.59      Diagnoses and all orders for this visit:    1. Urinary tract infection without hematuria, site unspecified  Resolved since hospitalization. 2. Sepsis, due to unspecified organism, unspecified whether acute organ dysfunction present [facility])  Resolved since hospitalization. 3. Acute kidney injury ([facility] [address]  Creatinine 1.67, GFR 29 on 9/3/2021 and creatinine 1.24, GFR 42 on 9/5/2021. Previously normal in the office. Potassium normal while hospitalized. Rechecking kidney function today.  If normal, will restart Lasix prn.   -     METABOLIC PANEL, BASIC    4. Lower extremity edema  Elevate feet frequently throughout the day, limit salt, stay well-hydrated. Will restart Lasix if kidney function is back to normal.    5. Hypertriglyceridemia  Due to have thyroid checked for elevated triglycerides.  -     TSH RFX ON ABNORMAL TO FREE T4    6. Idiopathic peripheral autonomic neuropathy  Given her recent hospitalization and risk for falls, she understands the need to decrease dose of Gabapentin. Decrease dose as ordered today with goal of no more than 300mg BID-TID if neuropathy is well controlled at lower dose. Also discussed the role increasing edema may be contributing to worsening of neuropathy.   -     gabapentin (NEURONTIN) 300 mg capsule; Take 300mg daily in and 600mg daily in pm.    7. Risk for falls  As above. 8. Essential hypertension  BP is below goal in the office today on carvedilol. Encouraged to check BP readings daily over the next 1 to 2 weeks. If BP readings are consistently greater than 150/90 we will restart ramipril. [address] discharge follow-up      Follow-up and Dispositions    · Return in about 2 weeks (around 9/30/2021) for follow up BP, edema, neuropathy.            Eliu Foy NP

## 2021-09-16 NOTE — PATIENT INSTRUCTIONS
Restart Gabapentin 300mg in am, 600mg in pm for the next week. If neuropathy is well controlled, decrease to 300mg in am and 300mg in pm. Goal is to control neuropathy with the lowest dose possible. Check BP daily after taking Carvediolol. Call if consistently greater than 150/90 or less than 110/60 on either number off Ramipril and Lasix.

## 2021-09-17 LAB
BUN SERPL-MCNC: 14 MG/DL (ref 8–27)
BUN/CREAT SERPL: 16 (ref 12–28)
CALCIUM SERPL-MCNC: 8.6 MG/DL (ref 8.7–10.3)
CHLORIDE SERPL-SCNC: 97 MMOL/L (ref 96–106)
CO2 SERPL-SCNC: 26 MMOL/L (ref 20–29)
CREAT SERPL-MCNC: 0.85 MG/DL (ref 0.57–1)
GLUCOSE SERPL-MCNC: 113 MG/DL (ref 65–99)
POTASSIUM SERPL-SCNC: 3.7 MMOL/L (ref 3.5–5.2)
SODIUM SERPL-SCNC: 139 MMOL/L (ref 134–144)
TSH SERPL DL<=0.005 MIU/L-ACNC: 1.93 UIU/ML (ref 0.45–4.5)

## 2021-09-17 NOTE — PROGRESS NOTES
Thyroid function is normal.  Kidney function is now normal.  Okay to restart Lasix for as needed use only. She may need to take it every day for the next few days to get the fluid off her feet. Lets have her hold off on restarting ramipril until we know how her home blood pressure readings are running.

## 2021-09-20 ENCOUNTER — OFFICE VISIT (OUTPATIENT)
Dept: FAMILY MEDICINE CLINIC | Age: 76
End: 2021-09-20
Payer: MEDICARE

## 2021-09-20 VITALS
DIASTOLIC BLOOD PRESSURE: 70 MMHG | HEART RATE: 82 BPM | SYSTOLIC BLOOD PRESSURE: 118 MMHG | BODY MASS INDEX: 29.59 KG/M2 | HEIGHT: 65 IN | TEMPERATURE: 97.6 F | WEIGHT: 177.6 LBS | OXYGEN SATURATION: 98 %

## 2021-09-20 DIAGNOSIS — M17.0 PRIMARY OSTEOARTHRITIS OF BOTH KNEES: Primary | ICD-10-CM

## 2021-09-20 PROCEDURE — G8427 DOCREV CUR MEDS BY ELIG CLIN: HCPCS | Performed by: FAMILY MEDICINE

## 2021-09-20 PROCEDURE — 1090F PRES/ABSN URINE INCON ASSESS: CPT | Performed by: FAMILY MEDICINE

## 2021-09-20 PROCEDURE — G8400 PT W/DXA NO RESULTS DOC: HCPCS | Performed by: FAMILY MEDICINE

## 2021-09-20 PROCEDURE — G8432 DEP SCR NOT DOC, RNG: HCPCS | Performed by: FAMILY MEDICINE

## 2021-09-20 PROCEDURE — G8752 SYS BP LESS 140: HCPCS | Performed by: FAMILY MEDICINE

## 2021-09-20 PROCEDURE — G8417 CALC BMI ABV UP PARAM F/U: HCPCS | Performed by: FAMILY MEDICINE

## 2021-09-20 PROCEDURE — 1101F PT FALLS ASSESS-DOCD LE1/YR: CPT | Performed by: FAMILY MEDICINE

## 2021-09-20 PROCEDURE — 20610 DRAIN/INJ JOINT/BURSA W/O US: CPT | Performed by: FAMILY MEDICINE

## 2021-09-20 PROCEDURE — 99212 OFFICE O/P EST SF 10 MIN: CPT | Performed by: FAMILY MEDICINE

## 2021-09-20 PROCEDURE — G8754 DIAS BP LESS 90: HCPCS | Performed by: FAMILY MEDICINE

## 2021-09-20 PROCEDURE — G8536 NO DOC ELDER MAL SCRN: HCPCS | Performed by: FAMILY MEDICINE

## 2021-09-20 RX ORDER — TRIAMCINOLONE ACETONIDE 40 MG/ML
40 INJECTION, SUSPENSION INTRA-ARTICULAR; INTRAMUSCULAR ONCE
Status: COMPLETED | OUTPATIENT
Start: 2021-09-20 | End: 2021-09-20

## 2021-09-20 RX ADMIN — TRIAMCINOLONE ACETONIDE 40 MG: 40 INJECTION, SUSPENSION INTRA-ARTICULAR; INTRAMUSCULAR at 14:08

## 2021-09-20 NOTE — PROGRESS NOTES
Chief Complaint   Patient presents with    Knee Pain     right knee pain ,would like injection in right knee    1. Have you been to the ER, urgent care clinic since your last visit? Hospitalized since your last visit? No    2. Have you seen or consulted any other health care providers outside of the [address] [place] [address] since your last visit? Include any pap smears or colon screening.  No

## 2021-09-20 NOTE — PROGRESS NOTES
Subjective  Chief Complaint   Patient presents with    Knee Pain     right knee pain ,would like injection in right knee      HPI:  Sonya Turk is a 67 y.o. female. Patient is here today requesting right knee injection. She is a history of bilateral knee arthritis but the right definitely bothers her more than the left. It has been over 3 months since her last injection was performed. Objective  Visit Vitals  /70 (BP 1 Location: Left upper arm, BP Patient Position: At rest, BP Cuff Size: Adult)   Pulse 82   Temp 97.6 °F (36.4 °C) (Temporal)   Ht 5' 5\" (1.651 m)   Wt 177 lb 9.6 oz (80.6 kg)   SpO2 98%   BMI 29.55 kg/m²     Physical Exam  Constitutional:       Appearance: Normal appearance. She is overweight. Comments: Ambulates with walker   Pulmonary:      Effort: Pulmonary effort is normal.   Neurological:      Mental Status: She is alert. Gait: Gait normal.   Psychiatric:         Mood and Affect: Mood and affect normal. Mood is not anxious or depressed. Behavior: Behavior normal.        Procedure Documentation:  After discussion of the risks and benefits and obtaining verbal consent, the patient elected to proceed with a cortisone injection into right knee. It was confirmed that the patient does not have history of prior adverse reactions, active infections, or relevant allergies. There was no effusion, erythema, or warmth, and the skin was clear. * Patient was identified by name and date of birth   * Agreement on procedure being performed was verified  * Risks and Benefits explained to the patient  * Procedure site verified and marked as necessary  * Patient was positioned for comfort  * Consent was signed and verified    Utilizing sterile technique, the skin was prepped. A 22 gauge needle was inserted into the joint(s) via a medial parapatellar approach The sites were injected with a mixture of 40 mg of Kenalog and 4cc 1% lidocaine.  The injection(s) was/were completed without complication, and a sterile bandage was applied. The patient tolerated the injection(s) well. The patient was provided written post-injection instruction sheet. The patient will call immediately with any signs of infection or allergic reaction. Time: current in chart  Date of procedure: 9/20/2021  Procedure performed by:  Sam Chavez MD                Assessment & Plan      ICD-10-CM ICD-9-CM    1. Primary osteoarthritis of both knees  M17.0 715.16 DRAIN/INJECT LARGE JOINT/BURSA      triamcinolone acetonide (KENALOG-40) 40 mg/mL injection 40 mg     Diagnoses and all orders for this visit:    1. Primary osteoarthritis of both knees  -     DRAIN/INJECT LARGE JOINT/BURSA  -     triamcinolone acetonide (KENALOG-40) 40 mg/mL injection 40 mg    Patient tolerated injection well. Post care instructions provided. We can repeat every 3 months if needed but no sooner.     Sam Chavez MD

## 2021-09-30 ENCOUNTER — OFFICE VISIT (OUTPATIENT)
Dept: FAMILY MEDICINE CLINIC | Age: 76
End: 2021-09-30
Payer: MEDICARE

## 2021-09-30 VITALS
RESPIRATION RATE: 16 BRPM | HEART RATE: 80 BPM | BODY MASS INDEX: 29.34 KG/M2 | SYSTOLIC BLOOD PRESSURE: 130 MMHG | DIASTOLIC BLOOD PRESSURE: 84 MMHG | WEIGHT: 176.13 LBS | HEIGHT: 65 IN | TEMPERATURE: 97.3 F

## 2021-09-30 DIAGNOSIS — Z23 ENCOUNTER FOR IMMUNIZATION: ICD-10-CM

## 2021-09-30 DIAGNOSIS — R60.0 LOWER EXTREMITY EDEMA: Primary | ICD-10-CM

## 2021-09-30 DIAGNOSIS — Z91.81 RISK FOR FALLS: ICD-10-CM

## 2021-09-30 DIAGNOSIS — I10 ESSENTIAL HYPERTENSION: ICD-10-CM

## 2021-09-30 DIAGNOSIS — G90.09 IDIOPATHIC PERIPHERAL AUTONOMIC NEUROPATHY: ICD-10-CM

## 2021-09-30 PROCEDURE — G8427 DOCREV CUR MEDS BY ELIG CLIN: HCPCS | Performed by: NURSE PRACTITIONER

## 2021-09-30 PROCEDURE — G0008 ADMIN INFLUENZA VIRUS VAC: HCPCS | Performed by: NURSE PRACTITIONER

## 2021-09-30 PROCEDURE — G8536 NO DOC ELDER MAL SCRN: HCPCS | Performed by: NURSE PRACTITIONER

## 2021-09-30 PROCEDURE — 90694 VACC AIIV4 NO PRSRV 0.5ML IM: CPT | Performed by: NURSE PRACTITIONER

## 2021-09-30 PROCEDURE — G8752 SYS BP LESS 140: HCPCS | Performed by: NURSE PRACTITIONER

## 2021-09-30 PROCEDURE — G8432 DEP SCR NOT DOC, RNG: HCPCS | Performed by: NURSE PRACTITIONER

## 2021-09-30 PROCEDURE — G8417 CALC BMI ABV UP PARAM F/U: HCPCS | Performed by: NURSE PRACTITIONER

## 2021-09-30 PROCEDURE — 1101F PT FALLS ASSESS-DOCD LE1/YR: CPT | Performed by: NURSE PRACTITIONER

## 2021-09-30 PROCEDURE — G8754 DIAS BP LESS 90: HCPCS | Performed by: NURSE PRACTITIONER

## 2021-09-30 PROCEDURE — 1090F PRES/ABSN URINE INCON ASSESS: CPT | Performed by: NURSE PRACTITIONER

## 2021-09-30 PROCEDURE — 99214 OFFICE O/P EST MOD 30 MIN: CPT | Performed by: NURSE PRACTITIONER

## 2021-09-30 PROCEDURE — G8400 PT W/DXA NO RESULTS DOC: HCPCS | Performed by: NURSE PRACTITIONER

## 2021-09-30 RX ORDER — NYSTATIN 100000 [USP'U]/G
POWDER TOPICAL
COMMUNITY
Start: 2021-09-28 | End: 2021-12-22

## 2021-09-30 RX ORDER — FLUCONAZOLE 150 MG/1
TABLET ORAL
COMMUNITY
End: 2021-12-22

## 2021-09-30 RX ORDER — PANCRELIPASE LIPASE, PANCRELIPASE PROTEASE, PANCRELIPASE AMYLASE 40000; 126000; 168000 [USP'U]/1; [USP'U]/1; [USP'U]/1
CAPSULE, DELAYED RELEASE ORAL
COMMUNITY
Start: 2021-09-24

## 2021-09-30 RX ORDER — NYSTATIN 100000 U/G
CREAM TOPICAL
COMMUNITY
Start: 2021-09-28 | End: 2021-12-22

## 2021-09-30 RX ORDER — LEVOFLOXACIN 750 MG/1
TABLET ORAL
COMMUNITY
Start: 2021-09-28 | End: 2021-12-22

## 2021-09-30 RX ORDER — BUMETANIDE 1 MG/1
1 TABLET ORAL DAILY
Qty: 30 TABLET | Refills: 0 | Status: SHIPPED | OUTPATIENT
Start: 2021-09-30 | End: 2021-10-29 | Stop reason: ALTCHOICE

## 2021-09-30 NOTE — PROGRESS NOTES
Subjective  Chief Complaint   Patient presents with    Follow-up     HTN, edema, neuropathy     HPI:  Kirby Aguilar is a 67 y.o. female. Patient presents for follow up management of edema, neuropathy, and home BP readings. Edema is ongoing. She has not been taking Lasix, unaware she could restart it. Daughter is concerned about dropping BP too low. She called cardiology for further guidance and is waiting a call back. Neuropathy is tolerable on lower does of Gabapentin. She does not feel a dose adjustment is needed at this alessandra. Reported home BP readings 110-120s/60-70s on Carvedilol only.      Past Medical History:   Diagnosis Date    Aortic stenosis     Hypercholesterolemia     Neuropathy     Idiopathic peripheral    Skin problem      Family History   Problem Relation Age of Onset    Thyroid Disease Daughter     Kidney Disease Son         kidney stone    Stroke Mother     Heart Attack Father     Heart Attack Brother     Stroke Maternal Grandmother      Social History     Socioeconomic History    Marital status:      Spouse name: Not on file    Number of children: Not on file    Years of education: Not on file    Highest education level: Not on file   Occupational History    Not on file   Tobacco Use    Smoking status: Current Every Day Smoker     Packs/day: 0.25     Years: 50.00     Pack years: 12.50     Types: Cigarettes    Smokeless tobacco: Never Used    Tobacco comment: 4-5 cigs a day currently, never smoked more than 1/2 PPD   Vaping Use    Vaping Use: Never used   Substance and Sexual Activity    Alcohol use: Never    Drug use: Never    Sexual activity: Not on file   Other Topics Concern    Not on file   Social History Narrative    Not on file     Social Determinants of Health     Financial Resource Strain:     Difficulty of Paying Living Expenses:    Food Insecurity:     Worried About Running Out of Food in the Last Year:     [address] in the Last Year: Transportation Needs:     Lack of Transportation (Medical):  Lack of Transportation (Non-Medical):    Physical Activity:     Days of Exercise per Week:     Minutes of Exercise per Session:    Stress:     Feeling of Stress :    Social Connections:     Frequency of Communication with Friends and Family:     Frequency of Social Gatherings with Friends and Family:     Attends Shinto Services:     Active Member of Clubs or Organizations:     Attends Club or Organization Meetings:     Marital Status:    Intimate Partner Violence:     Fear of Current or Ex-Partner:     Emotionally Abused:     Physically Abused:     Sexually Abused:      Current Outpatient Medications on File Prior to Visit   Medication Sig Dispense Refill    fluconazole (Diflucan) 150 mg tablet Diflucan 150 mg tablet   Take 1 tablet every day by oral route.  levoFLOXacin (LEVAQUIN) 750 mg tablet TAKE 1 TABLET BY MOUTH EVERY DAY FOR 14 DAYS      Zenpep 40,000-126,000- 168,000 unit cpDR capsule TAKE ONE CAPSULE BY MOUTH THREE TIMES DAILY WITH MEALS AND ONE WITH SNACKS DAILY      nystatin (MYCOSTATIN) topical cream APPLY TOPICALLY TO THE AFFECTED AREA TWICE DAILY      Nystop powder       Saccharomyces boulardii (Florastor) 250 mg capsule Take 250 mg by mouth two (2) times a day.  budesonide (ENTOCORT EC) 3 mg capsule Take 3 mg by mouth three (3) times daily.  gabapentin (NEURONTIN) 300 mg capsule Take 300mg daily in and 600mg daily in pm. 90 Capsule 0    diclofenac (VOLTAREN) 1 % gel diclofenac 1 % topical gel   APPLY 2 GRAMS TO THE AFFECTED AREAS FOUR TIMES DAILY      colestipoL (COLESTID) 1 gram tablet TAKE 1 TO 2 TABLETS BY MOUTH AS NEEDED FOR DIARRHEA      atorvastatin (LIPITOR) 40 mg tablet Take  by mouth daily.  carvediloL (COREG) 25 mg tablet Take 12.5 mg by mouth two (2) times daily (with meals).  multivitamin (ONE A DAY) tablet Take 1 Tab by mouth daily.       pyridoxine, vitamin B6, (VITAMIN B-6) 100 mg tablet Take 100 mg by mouth daily. No current facility-administered medications on file prior to visit. Allergies   Allergen Reactions    Crestor [Rosuvastatin] Myalgia    Dexlansoprazole Other (comments)    Keflex [Cephalexin] Diarrhea    Lipitor [Atorvastatin] Myalgia    Morphine Unknown (comments)    Niaspan [Niacin] Other (comments)     flushing    Sulfa (Sulfonamide Antibiotics) Swelling     Facial swelling    Tricor [Fenofibrate Micronized] Myalgia     ROS  See HPI for pertinent ROS. Objective  Visit Vitals  /84 (BP 1 Location: Right upper arm, BP Patient Position: Sitting)   Pulse 80   Temp 97.3 °F (36.3 °C)   Resp 16   Ht 5' 5\" (1.651 m)   Wt 176 lb 2 oz (79.9 kg)   BMI 29.31 kg/m²       Physical Exam  Vitals and nursing note reviewed. Constitutional:       General: She is not in acute distress. Appearance: Normal appearance. HENT:      Head: Normocephalic. Eyes:      Extraocular Movements: Extraocular movements intact. Cardiovascular:      Rate and Rhythm: Normal rate and regular rhythm. Heart sounds: Normal heart sounds. Pulmonary:      Effort: Pulmonary effort is normal.      Breath sounds: Normal breath sounds. Musculoskeletal:         General: Normal range of motion. Right lower leg: Edema (2+) present. Left lower leg: Edema (2+) present. Skin:     General: Skin is warm and dry. Neurological:      Mental Status: She is alert and oriented to person, place, and time. Psychiatric:         Mood and Affect: Mood normal.         Behavior: Behavior normal.          Assessment & Plan      ICD-10-CM ICD-9-CM    1. Lower extremity edema  R60.0 782.3 bumetanide (BUMEX) 1 mg tablet   2. Idiopathic peripheral autonomic neuropathy  G90.09 337.00    3. Essential hypertension  I10 401.9    4. Risk for falls  Z91.81 V15.88    5.  Encounter for immunization  Z23 V03.89 FLU (FLUAD QUAD INFLUENZA VACCINE,QUAD,ADJUVANTED)     Diagnoses and all orders for this visit:    1. Lower extremity edema  Do not take Lasix. Start Bumex as ordered. Call cardiology. -     bumetanide (BUMEX) 1 mg tablet; Take 1 Tablet by mouth daily. 2. Idiopathic peripheral autonomic neuropathy  Well controlled on current dose of Gabapentin. No changes. 3. Essential hypertension  BP is below goal in the office today and on reported home BP readings. Continue Carvedilol only. Continue to check BP readings regularly and call if consistently greater 150/90 on either number. 4. Risk for falls  Recommend no driving until we know what affect Bumex may have on her blood pressure readings. 5. Encounter for immunization  -     FLU ([facility])      Follow-up and Dispositions    · Return in about 1 month (around 10/30/2021) for follow up, hypertension, edema, nonfasting.            Pool Salvador, THIERRY

## 2021-09-30 NOTE — PROGRESS NOTES
Chief Complaint   Patient presents with    Follow-up     HTN, edema, neuropathy   1. Have you been to the ER, urgent care clinic since your last visit? Hospitalized since your last visit? Yes Reason for visit: better med, UTI, sep 2021    2. Have you seen or consulted any other health care providers outside of the [address] [place] [address] since your last visit? Include any pap smears or colon screening.  No

## 2021-10-29 ENCOUNTER — OFFICE VISIT (OUTPATIENT)
Dept: FAMILY MEDICINE CLINIC | Age: 76
End: 2021-10-29
Payer: MEDICARE

## 2021-10-29 VITALS
RESPIRATION RATE: 18 BRPM | DIASTOLIC BLOOD PRESSURE: 88 MMHG | TEMPERATURE: 97.3 F | BODY MASS INDEX: 29.89 KG/M2 | OXYGEN SATURATION: 98 % | SYSTOLIC BLOOD PRESSURE: 120 MMHG | HEIGHT: 65 IN | HEART RATE: 90 BPM | WEIGHT: 179.38 LBS

## 2021-10-29 DIAGNOSIS — I10 PRIMARY HYPERTENSION: Primary | ICD-10-CM

## 2021-10-29 DIAGNOSIS — R60.0 LOWER EXTREMITY EDEMA: ICD-10-CM

## 2021-10-29 DIAGNOSIS — E66.3 OVERWEIGHT WITH BODY MASS INDEX (BMI) OF 29 TO 29.9 IN ADULT: ICD-10-CM

## 2021-10-29 PROCEDURE — 99213 OFFICE O/P EST LOW 20 MIN: CPT | Performed by: NURSE PRACTITIONER

## 2021-10-29 PROCEDURE — G8432 DEP SCR NOT DOC, RNG: HCPCS | Performed by: NURSE PRACTITIONER

## 2021-10-29 PROCEDURE — G8427 DOCREV CUR MEDS BY ELIG CLIN: HCPCS | Performed by: NURSE PRACTITIONER

## 2021-10-29 PROCEDURE — 1090F PRES/ABSN URINE INCON ASSESS: CPT | Performed by: NURSE PRACTITIONER

## 2021-10-29 PROCEDURE — 1101F PT FALLS ASSESS-DOCD LE1/YR: CPT | Performed by: NURSE PRACTITIONER

## 2021-10-29 PROCEDURE — G8754 DIAS BP LESS 90: HCPCS | Performed by: NURSE PRACTITIONER

## 2021-10-29 PROCEDURE — G8752 SYS BP LESS 140: HCPCS | Performed by: NURSE PRACTITIONER

## 2021-10-29 PROCEDURE — G8400 PT W/DXA NO RESULTS DOC: HCPCS | Performed by: NURSE PRACTITIONER

## 2021-10-29 PROCEDURE — G8417 CALC BMI ABV UP PARAM F/U: HCPCS | Performed by: NURSE PRACTITIONER

## 2021-10-29 PROCEDURE — G8536 NO DOC ELDER MAL SCRN: HCPCS | Performed by: NURSE PRACTITIONER

## 2021-10-29 RX ORDER — FUROSEMIDE 40 MG/1
40 TABLET ORAL DAILY
COMMUNITY

## 2021-10-29 NOTE — PROGRESS NOTES
Chief Complaint   Patient presents with    Follow-up     HTN, edema   1. Have you been to the ER, urgent care clinic since your last visit? Hospitalized since your last visit? Yes Reason for visit: patient first, UTI, 10/27/2021    2. Have you seen or consulted any other health care providers outside of the [address] [place] [address] since your last visit? Include any pap smears or colon screening.  No   3 most recent PHQ Screens 10/29/2021   Little interest or pleasure in doing things Not at all   Feeling down, depressed, irritable, or hopeless Not at all   Total Score PHQ 2 0     Visit Vitals  /88 (BP 1 Location: Right upper arm, BP Patient Position: Sitting)   Pulse 90   Temp 97.3 °F (36.3 °C) (Temporal)   Resp 18   Ht 5' 5\" (1.651 m)   Wt 179 lb 6 oz (81.4 kg)   SpO2 98%   BMI 29.85 kg/m²

## 2021-10-29 NOTE — PROGRESS NOTES
Subjective  Chief Complaint   Patient presents with    Follow-up     HTN, edema     HPI:  Kirstin Ruby is a 67 y.o. female. Patient presents for follow-up management of hypertension and edema. Since our last visit she saw cardiology for follow up. Cardiology was happy with BP readings and did not restart Ramipril. Cardiology did restart Lasix for edema. Follow up scheduled in one month with cardiology. Reported home BP readings 130s/60-70. Past Medical History:   Diagnosis Date    Aortic stenosis     Hypercholesterolemia     Neuropathy     Idiopathic peripheral    Skin problem      Family History   Problem Relation Age of Onset    Thyroid Disease Daughter     Kidney Disease Son         kidney stone    Stroke Mother     Heart Attack Father     Heart Attack Brother     Stroke Maternal Grandmother      Social History     Socioeconomic History    Marital status:      Spouse name: Not on file    Number of children: Not on file    Years of education: Not on file    Highest education level: Not on file   Occupational History    Not on file   Tobacco Use    Smoking status: Current Every Day Smoker     Packs/day: 0.25     Years: 50.00     Pack years: 12.50     Types: Cigarettes    Smokeless tobacco: Never Used    Tobacco comment: 4-5 cigs a day currently, never smoked more than 1/2 PPD   Vaping Use    Vaping Use: Never used   Substance and Sexual Activity    Alcohol use: Never    Drug use: Never    Sexual activity: Not on file   Other Topics Concern    Not on file   Social History Narrative    Not on file     Social Determinants of Health     Financial Resource Strain:     Difficulty of Paying Living Expenses:    Food Insecurity:     Worried About Running Out of Food in the Last Year:     [address] in the Last Year:    Transportation Needs:     Lack of Transportation (Medical):      Lack of Transportation (Non-Medical):    Physical Activity:     Days of Exercise per Week:     Minutes of Exercise per Session:    Stress:     Feeling of Stress :    Social Connections:     Frequency of Communication with Friends and Family:     Frequency of Social Gatherings with Friends and Family:     Attends Pent[employer]ost[employer]ism Services:     Active Member of Clubs or Organizations:     Attends Club or Organization Meetings:     Marital Status:    Intimate Partner Violence:     Fear of Current or Ex-Partner:     Emotionally Abused:     Physically Abused:     Sexually Abused:      Current Outpatient Medications on File Prior to Visit   Medication Sig Dispense Refill    furosemide (LASIX) 40 mg tablet Take  by mouth daily.  gabapentin (NEURONTIN) 300 mg capsule TAKE ONE CAPSULE BY MOUTH EVERY MORNING AND 2 CAPSULES EVERY EVENING 90 Capsule 0    fluconazole (Diflucan) 150 mg tablet Diflucan 150 mg tablet   Take 1 tablet every day by oral route.  levoFLOXacin (LEVAQUIN) 750 mg tablet TAKE 1 TABLET BY MOUTH EVERY DAY FOR 14 DAYS      Zenpep 40,000-126,000- 168,000 unit cpDR capsule TAKE ONE CAPSULE BY MOUTH THREE TIMES DAILY WITH MEALS AND ONE WITH SNACKS DAILY      nystatin (MYCOSTATIN) topical cream APPLY TOPICALLY TO THE AFFECTED AREA TWICE DAILY      Nystop powder       Saccharomyces boulardii (Florastor) 250 mg capsule Take 250 mg by mouth two (2) times a day.  budesonide (ENTOCORT EC) 3 mg capsule Take 3 mg by mouth three (3) times daily.  diclofenac (VOLTAREN) 1 % gel diclofenac 1 % topical gel   APPLY 2 GRAMS TO THE AFFECTED AREAS FOUR TIMES DAILY      colestipoL (COLESTID) 1 gram tablet TAKE 1 TO 2 TABLETS BY MOUTH AS NEEDED FOR DIARRHEA      atorvastatin (LIPITOR) 40 mg tablet Take  by mouth daily.  carvediloL (COREG) 25 mg tablet Take 12.5 mg by mouth two (2) times daily (with meals).  multivitamin (ONE A DAY) tablet Take 1 Tab by mouth daily.  pyridoxine, vitamin B6, (VITAMIN B-6) 100 mg tablet Take 100 mg by mouth daily.       [DISCONTINUED] bumetanide (BUMEX) 1 mg tablet Take 1 Tablet by mouth daily. (Patient not taking: Reported on 10/29/2021) 30 Tablet 0     No current facility-administered medications on file prior to visit. Allergies   Allergen Reactions    Crestor [Rosuvastatin] Myalgia    Dexlansoprazole Other (comments)    Keflex [Cephalexin] Diarrhea    Lipitor [Atorvastatin] Myalgia    Morphine Unknown (comments)    Niaspan [Niacin] Other (comments)     flushing    Sulfa (Sulfonamide Antibiotics) Swelling     Facial swelling    Tricor [Fenofibrate Micronized] Myalgia     ROS  See HPI for pertinent ROS. Objective  Visit Vitals  /88 (BP 1 Location: Right upper arm, BP Patient Position: Sitting)   Pulse 90   Temp 97.3 °F (36.3 °C) (Temporal)   Resp 18   Ht 5' 5\" (1.651 m)   Wt 179 lb 6 oz (81.4 kg)   SpO2 98%   BMI 29.85 kg/m²       Physical Exam  Vitals and nursing note reviewed. Constitutional:       General: She is not in acute distress. Appearance: Normal appearance. She is overweight. HENT:      Head: Normocephalic. Eyes:      Extraocular Movements: Extraocular movements intact. Cardiovascular:      Rate and Rhythm: Normal rate and regular rhythm. Heart sounds: Normal heart sounds. Pulmonary:      Effort: Pulmonary effort is normal.      Breath sounds: Normal breath sounds. Musculoskeletal:         General: Normal range of motion. Right lower leg: Edema (1+ nonpitting) present. Left lower leg: No edema (1+ nonpitting). Skin:     General: Skin is warm and dry. Neurological:      Mental Status: She is alert and oriented to person, place, and time. Psychiatric:         Mood and Affect: Mood normal.         Behavior: Behavior normal.          Assessment & Plan      ICD-10-CM ICD-9-CM    1. Primary hypertension  I10 401.9    2. Lower extremity edema  R60.0 782.3    3.  Overweight with body mass index (BMI) of 29 to 29.9 in adult  E66.3 278.02     Z68.29 V85.25      Diagnoses and all orders for this visit:    1. Primary hypertension  BP is below goal in the office today and on reported home readings. Continue to check BP readings regularly and call cardiology if consistently greater than 140/90 on either number. 2. Lower extremity edema  Improving since Lasix was restarted by cardiology. Follow-up as scheduled with cardiology. 3. Overweight with body mass index (BMI) of 29 to 29.9 in adult  Weight is up 3 pounds since her last visit. Increase exercise as tolerated and eat a healthy diet. Follow-up and Dispositions    · Return in about 3 months (around 1/29/2022) for MCW, fasting labs, follow up, chronic conditions/meds.            Natividad Solis NP

## 2021-11-14 DIAGNOSIS — G90.09 IDIOPATHIC PERIPHERAL AUTONOMIC NEUROPATHY: ICD-10-CM

## 2021-11-16 RX ORDER — GABAPENTIN 300 MG/1
CAPSULE ORAL
Qty: 90 CAPSULE | Refills: 0 | Status: SHIPPED | OUTPATIENT
Start: 2021-11-16 | End: 2021-12-21

## 2021-11-29 ENCOUNTER — TELEPHONE (OUTPATIENT)
Dept: FAMILY MEDICINE CLINIC | Age: 76
End: 2021-11-29

## 2021-11-29 NOTE — TELEPHONE ENCOUNTER
Patient stated that she was seen by a vein specialist and was diagnosed with a Bakers Cyst behind her knee. She wanted to know what kind of specialist to see to have it removed.

## 2021-12-18 DIAGNOSIS — G90.09 IDIOPATHIC PERIPHERAL AUTONOMIC NEUROPATHY: ICD-10-CM

## 2021-12-21 RX ORDER — GABAPENTIN 300 MG/1
CAPSULE ORAL
Qty: 90 CAPSULE | Refills: 0 | Status: SHIPPED | OUTPATIENT
Start: 2021-12-21 | End: 2022-01-20

## 2021-12-22 ENCOUNTER — APPOINTMENT (OUTPATIENT)
Dept: GENERAL RADIOLOGY | Age: 76
DRG: 189 | End: 2021-12-22
Attending: STUDENT IN AN ORGANIZED HEALTH CARE EDUCATION/TRAINING PROGRAM
Payer: MEDICARE

## 2021-12-22 ENCOUNTER — APPOINTMENT (OUTPATIENT)
Dept: CT IMAGING | Age: 76
DRG: 189 | End: 2021-12-22
Attending: STUDENT IN AN ORGANIZED HEALTH CARE EDUCATION/TRAINING PROGRAM
Payer: MEDICARE

## 2021-12-22 ENCOUNTER — HOSPITAL ENCOUNTER (INPATIENT)
Age: 76
LOS: 1 days | Discharge: HOME | DRG: 189 | End: 2021-12-23
Attending: STUDENT IN AN ORGANIZED HEALTH CARE EDUCATION/TRAINING PROGRAM | Admitting: INTERNAL MEDICINE
Payer: MEDICARE

## 2021-12-22 DIAGNOSIS — I50.811 ACUTE RIGHT-SIDED CONGESTIVE HEART FAILURE (HCC): ICD-10-CM

## 2021-12-22 DIAGNOSIS — R09.02 HYPOXIA: Primary | ICD-10-CM

## 2021-12-22 DIAGNOSIS — J44.1 CHRONIC OBSTRUCTIVE PULMONARY DISEASE WITH ACUTE EXACERBATION (HCC): ICD-10-CM

## 2021-12-22 DIAGNOSIS — R77.8 ELEVATED TROPONIN: ICD-10-CM

## 2021-12-22 PROBLEM — J96.01 ACUTE RESPIRATORY FAILURE WITH HYPOXIA (HCC): Status: ACTIVE | Noted: 2021-12-22

## 2021-12-22 LAB
ALBUMIN SERPL-MCNC: 2.8 G/DL (ref 3.5–5)
ALBUMIN/GLOB SERPL: 0.7 {RATIO} (ref 1.1–2.2)
ALP SERPL-CCNC: 54 U/L (ref 45–117)
ALT SERPL-CCNC: 27 U/L (ref 12–78)
ANION GAP SERPL CALC-SCNC: 3 MMOL/L (ref 5–15)
ARTERIAL PATENCY WRIST A: POSITIVE
AST SERPL-CCNC: 28 U/L (ref 15–37)
B PERT DNA SPEC QL NAA+PROBE: NOT DETECTED
BASOPHILS # BLD: 0 K/UL (ref 0–0.1)
BASOPHILS NFR BLD: 0 % (ref 0–1)
BDY SITE: ABNORMAL
BILIRUB SERPL-MCNC: 0.2 MG/DL (ref 0.2–1)
BNP SERPL-MCNC: 1695 PG/ML (ref 0–450)
BORDETELLA PARAPERTUSSIS PCR, BORPAR: NOT DETECTED
BUN SERPL-MCNC: 12 MG/DL (ref 6–20)
BUN/CREAT SERPL: 14 (ref 12–20)
C PNEUM DNA SPEC QL NAA+PROBE: NOT DETECTED
CALCIUM SERPL-MCNC: 8 MG/DL (ref 8.5–10.1)
CHLORIDE SERPL-SCNC: 106 MMOL/L (ref 97–108)
CO2 SERPL-SCNC: 34 MMOL/L (ref 21–32)
COMMENT, HOLDF: NORMAL
COVID-19 RAPID TEST, COVR: NOT DETECTED
CREAT SERPL-MCNC: 0.84 MG/DL (ref 0.55–1.02)
CRP SERPL-MCNC: <0.29 MG/DL (ref 0–0.6)
DIFFERENTIAL METHOD BLD: ABNORMAL
EOSINOPHIL # BLD: 0.3 K/UL (ref 0–0.4)
EOSINOPHIL NFR BLD: 5 % (ref 0–7)
ERYTHROCYTE [DISTWIDTH] IN BLOOD BY AUTOMATED COUNT: 13.2 % (ref 11.5–14.5)
ERYTHROCYTE [SEDIMENTATION RATE] IN BLOOD: 39 MM/HR (ref 0–30)
FLUAV H1 2009 PAND RNA SPEC QL NAA+PROBE: NOT DETECTED
FLUAV H1 RNA SPEC QL NAA+PROBE: NOT DETECTED
FLUAV H3 RNA SPEC QL NAA+PROBE: NOT DETECTED
FLUAV SUBTYP SPEC NAA+PROBE: NOT DETECTED
FLUBV RNA SPEC QL NAA+PROBE: NOT DETECTED
GAS FLOW.O2 O2 DELIVERY SYS: ABNORMAL L/MIN
GAS FLOW.O2 SETTING OXYMISER: 26 BPM
GLOBULIN SER CALC-MCNC: 3.8 G/DL (ref 2–4)
GLUCOSE SERPL-MCNC: 96 MG/DL (ref 65–100)
HADV DNA SPEC QL NAA+PROBE: NOT DETECTED
HCO3 BLD-SCNC: 28.9 MMOL/L (ref 22–26)
HCOV 229E RNA SPEC QL NAA+PROBE: NOT DETECTED
HCOV HKU1 RNA SPEC QL NAA+PROBE: NOT DETECTED
HCOV NL63 RNA SPEC QL NAA+PROBE: NOT DETECTED
HCOV OC43 RNA SPEC QL NAA+PROBE: NOT DETECTED
HCT VFR BLD AUTO: 40.1 % (ref 35–47)
HGB BLD-MCNC: 12.7 G/DL (ref 11.5–16)
HMPV RNA SPEC QL NAA+PROBE: NOT DETECTED
HPIV1 RNA SPEC QL NAA+PROBE: NOT DETECTED
HPIV2 RNA SPEC QL NAA+PROBE: NOT DETECTED
HPIV3 RNA SPEC QL NAA+PROBE: NOT DETECTED
HPIV4 RNA SPEC QL NAA+PROBE: DETECTED
IMM GRANULOCYTES # BLD AUTO: 0 K/UL (ref 0–0.04)
IMM GRANULOCYTES NFR BLD AUTO: 0 % (ref 0–0.5)
INR PPP: 1.1 (ref 0.9–1.1)
LYMPHOCYTES # BLD: 2.4 K/UL (ref 0.8–3.5)
LYMPHOCYTES NFR BLD: 37 % (ref 12–49)
M PNEUMO DNA SPEC QL NAA+PROBE: NOT DETECTED
MAGNESIUM SERPL-MCNC: 1.7 MG/DL (ref 1.6–2.4)
MCH RBC QN AUTO: 31.6 PG (ref 26–34)
MCHC RBC AUTO-ENTMCNC: 31.7 G/DL (ref 30–36.5)
MCV RBC AUTO: 99.8 FL (ref 80–99)
MONOCYTES # BLD: 0.6 K/UL (ref 0–1)
MONOCYTES NFR BLD: 9 % (ref 5–13)
NEUTS SEG # BLD: 3.1 K/UL (ref 1.8–8)
NEUTS SEG NFR BLD: 49 % (ref 32–75)
NRBC # BLD: 0 K/UL (ref 0–0.01)
NRBC BLD-RTO: 0 PER 100 WBC
PCO2 BLD: 49.6 MMHG (ref 35–45)
PH BLD: 7.37 [PH] (ref 7.35–7.45)
PLATELET # BLD AUTO: 140 K/UL (ref 150–400)
PMV BLD AUTO: 10.1 FL (ref 8.9–12.9)
PO2 BLD: 86 MMHG (ref 80–100)
POTASSIUM SERPL-SCNC: 3.9 MMOL/L (ref 3.5–5.1)
PROT SERPL-MCNC: 6.6 G/DL (ref 6.4–8.2)
PROTHROMBIN TIME: 10.9 SEC (ref 9–11.1)
RBC # BLD AUTO: 4.02 M/UL (ref 3.8–5.2)
RSV RNA SPEC QL NAA+PROBE: NOT DETECTED
RV+EV RNA SPEC QL NAA+PROBE: NOT DETECTED
SAMPLES BEING HELD,HOLD: NORMAL
SAO2 % BLD: 96.1 % (ref 92–97)
SARS-COV-2 PCR, COVPCR: NOT DETECTED
SODIUM SERPL-SCNC: 143 MMOL/L (ref 136–145)
SOURCE, COVRS: NORMAL
SPECIMEN TYPE: ABNORMAL
TROPONIN-HIGH SENSITIVITY: 140 NG/L (ref 0–51)
WBC # BLD AUTO: 6.4 K/UL (ref 3.6–11)

## 2021-12-22 PROCEDURE — 80053 COMPREHEN METABOLIC PANEL: CPT

## 2021-12-22 PROCEDURE — 71045 X-RAY EXAM CHEST 1 VIEW: CPT

## 2021-12-22 PROCEDURE — 65660000000 HC RM CCU STEPDOWN

## 2021-12-22 PROCEDURE — 74011250636 HC RX REV CODE- 250/636: Performed by: STUDENT IN AN ORGANIZED HEALTH CARE EDUCATION/TRAINING PROGRAM

## 2021-12-22 PROCEDURE — 74011250636 HC RX REV CODE- 250/636: Performed by: INTERNAL MEDICINE

## 2021-12-22 PROCEDURE — 85610 PROTHROMBIN TIME: CPT

## 2021-12-22 PROCEDURE — 83880 ASSAY OF NATRIURETIC PEPTIDE: CPT

## 2021-12-22 PROCEDURE — 85652 RBC SED RATE AUTOMATED: CPT

## 2021-12-22 PROCEDURE — 93005 ELECTROCARDIOGRAM TRACING: CPT

## 2021-12-22 PROCEDURE — 71275 CT ANGIOGRAPHY CHEST: CPT

## 2021-12-22 PROCEDURE — 82803 BLOOD GASES ANY COMBINATION: CPT

## 2021-12-22 PROCEDURE — 36600 WITHDRAWAL OF ARTERIAL BLOOD: CPT

## 2021-12-22 PROCEDURE — 0202U NFCT DS 22 TRGT SARS-COV-2: CPT

## 2021-12-22 PROCEDURE — 84484 ASSAY OF TROPONIN QUANT: CPT

## 2021-12-22 PROCEDURE — 99285 EMERGENCY DEPT VISIT HI MDM: CPT

## 2021-12-22 PROCEDURE — 83735 ASSAY OF MAGNESIUM: CPT

## 2021-12-22 PROCEDURE — 74011000636 HC RX REV CODE- 636: Performed by: STUDENT IN AN ORGANIZED HEALTH CARE EDUCATION/TRAINING PROGRAM

## 2021-12-22 PROCEDURE — 36415 COLL VENOUS BLD VENIPUNCTURE: CPT

## 2021-12-22 PROCEDURE — 85025 COMPLETE CBC W/AUTO DIFF WBC: CPT

## 2021-12-22 PROCEDURE — 74011000250 HC RX REV CODE- 250: Performed by: STUDENT IN AN ORGANIZED HEALTH CARE EDUCATION/TRAINING PROGRAM

## 2021-12-22 PROCEDURE — 86140 C-REACTIVE PROTEIN: CPT

## 2021-12-22 PROCEDURE — 94640 AIRWAY INHALATION TREATMENT: CPT

## 2021-12-22 PROCEDURE — 87635 SARS-COV-2 COVID-19 AMP PRB: CPT

## 2021-12-22 RX ORDER — FUROSEMIDE 10 MG/ML
40 INJECTION INTRAMUSCULAR; INTRAVENOUS ONCE
Status: COMPLETED | OUTPATIENT
Start: 2021-12-22 | End: 2021-12-22

## 2021-12-22 RX ORDER — ONDANSETRON 2 MG/ML
4 INJECTION INTRAMUSCULAR; INTRAVENOUS
Status: DISCONTINUED | OUTPATIENT
Start: 2021-12-22 | End: 2021-12-23 | Stop reason: HOSPADM

## 2021-12-22 RX ORDER — SODIUM CHLORIDE 0.9 % (FLUSH) 0.9 %
5-40 SYRINGE (ML) INJECTION EVERY 8 HOURS
Status: DISCONTINUED | OUTPATIENT
Start: 2021-12-22 | End: 2021-12-23 | Stop reason: HOSPADM

## 2021-12-22 RX ORDER — SODIUM CHLORIDE 0.9 % (FLUSH) 0.9 %
5-40 SYRINGE (ML) INJECTION AS NEEDED
Status: DISCONTINUED | OUTPATIENT
Start: 2021-12-22 | End: 2021-12-23 | Stop reason: HOSPADM

## 2021-12-22 RX ORDER — IPRATROPIUM BROMIDE AND ALBUTEROL SULFATE 2.5; .5 MG/3ML; MG/3ML
3 SOLUTION RESPIRATORY (INHALATION)
Status: DISCONTINUED | OUTPATIENT
Start: 2021-12-22 | End: 2021-12-23

## 2021-12-22 RX ORDER — ACETAMINOPHEN 325 MG/1
650 TABLET ORAL
Status: DISCONTINUED | OUTPATIENT
Start: 2021-12-22 | End: 2021-12-23 | Stop reason: HOSPADM

## 2021-12-22 RX ORDER — POLYETHYLENE GLYCOL 3350 17 G/17G
17 POWDER, FOR SOLUTION ORAL DAILY PRN
Status: DISCONTINUED | OUTPATIENT
Start: 2021-12-22 | End: 2021-12-23 | Stop reason: HOSPADM

## 2021-12-22 RX ORDER — ONDANSETRON 4 MG/1
4 TABLET, ORALLY DISINTEGRATING ORAL
Status: DISCONTINUED | OUTPATIENT
Start: 2021-12-22 | End: 2021-12-23 | Stop reason: HOSPADM

## 2021-12-22 RX ORDER — ENOXAPARIN SODIUM 100 MG/ML
40 INJECTION SUBCUTANEOUS DAILY
Status: DISCONTINUED | OUTPATIENT
Start: 2021-12-23 | End: 2021-12-23 | Stop reason: HOSPADM

## 2021-12-22 RX ORDER — ACETAMINOPHEN 650 MG/1
650 SUPPOSITORY RECTAL
Status: DISCONTINUED | OUTPATIENT
Start: 2021-12-22 | End: 2021-12-23 | Stop reason: HOSPADM

## 2021-12-22 RX ORDER — IPRATROPIUM BROMIDE AND ALBUTEROL SULFATE 2.5; .5 MG/3ML; MG/3ML
3 SOLUTION RESPIRATORY (INHALATION)
Status: COMPLETED | OUTPATIENT
Start: 2021-12-22 | End: 2021-12-22

## 2021-12-22 RX ADMIN — IOPAMIDOL 100 ML: 755 INJECTION, SOLUTION INTRAVENOUS at 17:40

## 2021-12-22 RX ADMIN — FUROSEMIDE 40 MG: 10 INJECTION, SOLUTION INTRAMUSCULAR; INTRAVENOUS at 16:51

## 2021-12-22 RX ADMIN — Medication 10 ML: at 23:06

## 2021-12-22 RX ADMIN — METHYLPREDNISOLONE SODIUM SUCCINATE 125 MG: 125 INJECTION, POWDER, FOR SOLUTION INTRAMUSCULAR; INTRAVENOUS at 17:18

## 2021-12-22 RX ADMIN — IPRATROPIUM BROMIDE AND ALBUTEROL SULFATE 3 ML: .5; 3 SOLUTION RESPIRATORY (INHALATION) at 17:21

## 2021-12-22 RX ADMIN — METHYLPREDNISOLONE SODIUM SUCCINATE 40 MG: 40 INJECTION, POWDER, FOR SOLUTION INTRAMUSCULAR; INTRAVENOUS at 23:05

## 2021-12-22 NOTE — ED TRIAGE NOTES
Pt complains of cough with congestion that started several days ago and is keeping her awake at night. Per EMS o2sat was 90%on room air at home.

## 2021-12-22 NOTE — ED PROVIDER NOTES
62-year-old with history of aortic stenosis, hyperglycemia, chronic peripheral edema, CAD status post stent to the RCA, aortic valve replacement presenting with shortness of breath, cough. Endorses chills. Has had productive cough with purulent sputum. No known sick contacts. Is vaccinated for Covid, and a booster about a week ago. She presents today because she has had worsening shortness of breath over the past several days. She was borderline hypoxemic for EMS. Her saturations fluctuate between 85 and low 90s for the most part of the ED. Denies recent chest pain.            Past Medical History:   Diagnosis Date    Aortic stenosis     Hypercholesterolemia     Neuropathy     Idiopathic peripheral    Skin problem        Past Surgical History:   Procedure Laterality Date    HX AORTIC VALVE REPLACEMENT  11/30/2018    TAVR    HX BLADDER REPAIR  07/24/2018    Unspecified procedure on bladder    HX BLADDER SUSPENSION  06/2011    Mesh bladder suspension for pelvic prolapse    HX CHOLECYSTECTOMY      HX CHOLECYSTECTOMY  01/23/2013    HX COLONOSCOPY  10/10/2018    HX CORONARY STENT PLACEMENT  09/25/2018    RCA    HX GI  2008    Open repair Zenker's diverticulum    HX GYN  07/24/2018    Endometrial biopsy, repair of perineum vaginal closure    HX KNEE ARTHROSCOPY  1958    HX LITHOTRIPSY Left 1987    HX ORTHOPAEDIC Right 06/15/2017    Right hip titanium vasiliy insertion after fracture    HX TOTAL COLECTOMY  11/09/2009    Partial colectomy due to diverticular disease         Family History:   Problem Relation Age of Onset    Thyroid Disease Daughter     Kidney Disease Son         kidney stone    Stroke Mother     Heart Attack Father     Heart Attack Brother     Stroke Maternal Grandmother        Social History     Socioeconomic History    Marital status:      Spouse name: Not on file    Number of children: Not on file    Years of education: Not on file    Highest education level: Not on file   Occupational History    Not on file   Tobacco Use    Smoking status: Current Every Day Smoker     Packs/day: 0.25     Years: 50.00     Pack years: 12.50     Types: Cigarettes    Smokeless tobacco: Never Used    Tobacco comment: 4-5 cigs a day currently, never smoked more than 1/2 PPD   Vaping Use    Vaping Use: Never used   Substance and Sexual Activity    Alcohol use: Never    Drug use: Never    Sexual activity: Not on file   Other Topics Concern    Not on file   Social History Narrative    Not on file     Social Determinants of Health     Financial Resource Strain:     Difficulty of Paying Living Expenses: Not on file   Food Insecurity:     Worried About Running Out of Food in the Last Year: Not on file    Courtney of Food in the Last Year: Not on file   Transportation Needs:     Lack of Transportation (Medical): Not on file    Lack of Transportation (Non-Medical):  Not on file   Physical Activity:     Days of Exercise per Week: Not on file    Minutes of Exercise per Session: Not on file   Stress:     Feeling of Stress : Not on file   Social Connections:     Frequency of Communication with Friends and Family: Not on file    Frequency of Social Gatherings with Friends and Family: Not on file    Attends Hinduism Services: Not on file    Active Member of [address] [place] [address] [employer] or Organizations: Not on file    Attends Club or Organization Meetings: Not on file    Marital Status: Not on file   Intimate Partner Violence:     Fear of Current or Ex-Partner: Not on file    Emotionally Abused: Not on file    Physically Abused: Not on file    Sexually Abused: Not on file   Housing Stability:     Unable to Pay for Housing in the Last Year: Not on file    Number of [place] in the Last Year: Not on file    Unstable Housing in the Last Year: Not on file         ALLERGIES: Crestor [rosuvastatin], Dexlansoprazole, Keflex [cephalexin], Lipitor [atorvastatin], Morphine, Niaspan [niacin], Sulfa (sulfonamide antibiotics), and Tricor [fenofibrate micronized]    Review of Systems   Constitutional: Negative for chills. Respiratory: Positive for cough. Negative for shortness of breath. Cardiovascular: Positive for leg swelling. Negative for chest pain and palpitations. Gastrointestinal: Negative for abdominal pain, nausea and vomiting. Genitourinary: Negative for dysuria. Musculoskeletal: Negative for gait problem. Neurological: Negative for light-headedness and headaches. Psychiatric/Behavioral: Negative for confusion. The patient is not nervous/anxious. All other systems reviewed and are negative. Vitals:    12/22/21 1248   BP: (!) 211/96   Pulse: (!) 111   Resp: 20   Temp: 98.5 °F (36.9 °C)   SpO2: 95%   Weight: 73.9 kg (163 lb)   Height: 5' 3\" (1.6 m)            Physical Exam  Vitals and nursing note reviewed. Constitutional:       General: She is not in acute distress. Appearance: She is well-developed. She is not ill-appearing or toxic-appearing. HENT:      Head: Normocephalic and atraumatic. Mouth/Throat:      Mouth: Mucous membranes are moist.      Pharynx: No oropharyngeal exudate. Eyes:      Extraocular Movements: Extraocular movements intact. Pupils: Pupils are equal, round, and reactive to light. Cardiovascular:      Rate and Rhythm: Normal rate and regular rhythm. Heart sounds: Normal heart sounds. Pulmonary:      Effort: Pulmonary effort is normal. Tachypnea present. No respiratory distress. Breath sounds: Wheezing and rales present. Chest:      Chest wall: No tenderness. Abdominal:      General: There is no distension. Palpations: Abdomen is soft. Tenderness: There is no abdominal tenderness. Musculoskeletal:         General: No deformity. Cervical back: Normal range of motion. Right lower leg: Edema present. Left lower leg: Edema present. Comments: Entire spine palpated, no tenderness or deformity.    Skin: General: Skin is warm and dry. Capillary Refill: Capillary refill takes less than 2 seconds. Neurological:      General: No focal deficit present. Mental Status: She is alert and oriented to person, place, and time.    Psychiatric:         Mood and Affect: Mood normal.          MDM  Number of Diagnoses or Management Options     Amount and/or Complexity of Data Reviewed  Clinical lab tests: ordered and reviewed  Tests in the radiology section of CPT®: ordered and reviewed  Obtain history from someone other than the patient: yes ([address] [place] [address])  Discuss the patient with other providers: yes (Patient's 2 daughters and son)    Risk of Complications, Morbidity, and/or Mortality  Presenting problems: moderate  Diagnostic procedures: moderate  Management options: moderate      ED Course as of 21 1613   Wed Dec 22, 2021   1338 EKSinus tachycardia, ventricular rate 101, normal axis, no ST elevation, Q waves in lead III, [NS]      ED Course User Index  [NS] Silke Juarez MD       Procedures          MEDICAL DECISION MAKIN y.o. female presents with Cough    Differential diagnosis includes but not limited to: Pneumonia, COPD exacerbation, congestive heart failure, Covid, pleural effusion, also factorial hypoxemia    LABORATORY TESTS:  Labs Reviewed   CBC WITH AUTOMATED DIFF - Abnormal; Notable for the following components:       Result Value    MCV 99.8 (*)     PLATELET 250 (*)     All other components within normal limits   METABOLIC PANEL, COMPREHENSIVE - Abnormal; Notable for the following components:    CO2 34 (*)     Anion gap 3 (*)     Calcium 8.0 (*)     Albumin 2.8 (*)     A-G Ratio 0.7 (*)     All other components within normal limits   NT-PRO BNP - Abnormal; Notable for the following components:    NT pro-BNP 1,695 (*)     All other components within normal limits   TROPONIN-HIGH SENSITIVITY - Abnormal; Notable for the following components:    Troponin-High Sensitivity 140 (*) All other components within normal limits   SED RATE (ESR) - Abnormal; Notable for the following components:    Sed rate, automated 39 (*)     All other components within normal limits   COVID-19 RAPID TEST   SAMPLES BEING HELD   MAGNESIUM   PROTHROMBIN TIME + INR   C REACTIVE PROTEIN, QT       IMAGING RESULTS:  XR CHEST PORT   Final Result      No acute process. CTA CHEST W OR W WO CONT    (Results Pending)       MEDICATIONS GIVEN:  Medications   furosemide (LASIX) injection 40 mg (has no administration in time range)       PROGRESS NOTE:   4:16 PM Patient's symptoms have not improved    EKG:  Reviewed     CONSULTS:  Hospitalist Consult: [address] for Admission  4:16 PM    ED Room Number: WER15/15  Patient Name and age:  Jaden Roper 67 y.o.  female  Working Diagnosis:   1. Hypoxia    2. Acute right-sided congestive heart failure ([address]    3. Chronic obstructive pulmonary disease with acute exacerbation (HCC)    4. Elevated troponin        COVID-19 Suspicion:  no  Sepsis present:  no  Reassessment needed: no  Code Status:  Full Code  Readmission: no  Isolation Requirements:  no  Recommended Level of Care:  telemetry  Department:[facility] - [phone]  Other: Patient presenting with gradually worsening dyspnea, productive cough, worsening bilateral lower extremity edema. Her sputum is purulent. Likely with multifactorial CHF, COPD exacerbation. Will obtain CTA to rule out PE, and to further characterize. IMPRESSION:  1. Hypoxia    2. Acute right-sided congestive heart failure ([address]    3. Chronic obstructive pulmonary disease with acute exacerbation (HCC)    4. Elevated troponin        PLAN:  - Admit to hospitalist    Total critical care time spent exclusive of procedures:  39 minutes    Vinny Bland MD          Please note that this dictation was completed with [employer], the [employer] voice recognition software.   Quite often unanticipated grammatical, syntax, homophones, and other interpretive errors are inadvertently transcribed by the computer software. Please disregard these errors. Please excuse any errors that have escaped final proofreading.

## 2021-12-22 NOTE — PROGRESS NOTES
[facility]: MED RECONCILIATION    Comments/Recommendations:   Medication reconciliation completed by nursing, pharmacist clarified carvedilol and furosemide dose with patient and daughter over the phone. Patient had morning medications today prior to hospital arrival.     Medications added:     none    Medications removed:    Budesonide  Diclofenac gel  Fluconazole  Levaquin   Nystatin cream  Nystatin powder    Medications adjusted: Added dose to atorvastatin  Added dose to furosemide    Information obtained from: patient, daughter, Rx query, nursing    Allergies: Crestor [rosuvastatin], Dexlansoprazole, Keflex [cephalexin], Lipitor [atorvastatin], Morphine, Niaspan [niacin], Sulfa (sulfonamide antibiotics), and Tricor [fenofibrate micronized]    Prior to Admission Medications:     Medication Documentation Review Audit       Reviewed by Olivia Dangelo (Pharmacist) on 12/22/21 at 1852      Medication Sig Documenting Provider Last Dose Status Taking?   atorvastatin (LIPITOR) 40 mg tablet Take 40 mg by mouth every evening. Provider, Historical 12/21/2021 Active Yes   carvediloL (COREG) 25 mg tablet Take 12.5 mg by mouth two (2) times daily (with meals). Provider, Historical 12/22/2021 AM Active Yes   colestipoL (COLESTID) 1 gram tablet TAKE 1 TO 2 TABLETS BY MOUTH AS NEEDED FOR DIARRHEA Provider, Historical  Active Yes   furosemide (LASIX) 40 mg tablet Take 40 mg by mouth daily. Provider, Historical 12/22/2021 AM Active Yes   gabapentin (NEURONTIN) 300 mg capsule TAKE 1 CAPSULE BY MOUTH EVERY MORNING AND TAKE 2 CAPSULES BY MOUTH EVERY Carles THIERRY Greenberg 12/22/2021 AM Active Yes   multivitamin (ONE A DAY) tablet Take 1 Tab by mouth daily. Provider, Historical 12/22/2021 AM Active Yes   pyridoxine, vitamin B6, (VITAMIN B-6) 100 mg tablet Take 100 mg by mouth daily. Provider, Historical 12/22/2021 AM Active Yes   Saccharomyces boulardii (Florastor) 250 mg capsule Take 250 mg by mouth two (2) times a day. Provider, Historical 12/22/2021 AM Active Yes   Zenpep 40,000-126,000- 168,000 unit cpDR capsule TAKE ONE CAPSULE BY MOUTH THREE TIMES DAILY WITH MEALS AND ONE WITH SNACKS DAILY Provider, Historical 12/22/2021 AM Active Yes                    Thank You,   Olivia Velasco, PharmD, BCPS   Contact: [phone]

## 2021-12-23 ENCOUNTER — APPOINTMENT (OUTPATIENT)
Dept: NON INVASIVE DIAGNOSTICS | Age: 76
DRG: 189 | End: 2021-12-23
Attending: INTERNAL MEDICINE
Payer: MEDICARE

## 2021-12-23 VITALS
TEMPERATURE: 98.8 F | SYSTOLIC BLOOD PRESSURE: 165 MMHG | OXYGEN SATURATION: 99 % | WEIGHT: 177 LBS | HEIGHT: 63 IN | BODY MASS INDEX: 31.36 KG/M2 | HEART RATE: 99 BPM | DIASTOLIC BLOOD PRESSURE: 110 MMHG | RESPIRATION RATE: 18 BRPM

## 2021-12-23 PROBLEM — I25.10 CORONARY ARTERY DISEASE INVOLVING NATIVE CORONARY ARTERY OF NATIVE HEART: Status: ACTIVE | Noted: 2021-12-23

## 2021-12-23 PROBLEM — J20.4 PARAINFLUENZA VIRUS BRONCHITIS: Status: ACTIVE | Noted: 2021-12-23

## 2021-12-23 LAB
ANION GAP SERPL CALC-SCNC: 5 MMOL/L (ref 5–15)
APPEARANCE UR: ABNORMAL
ATRIAL RATE: 101 BPM
BACTERIA URNS QL MICRO: ABNORMAL /HPF
BILIRUB UR QL: NEGATIVE
BUN SERPL-MCNC: 12 MG/DL (ref 6–20)
BUN/CREAT SERPL: 18 (ref 12–20)
CALCIUM SERPL-MCNC: 8.2 MG/DL (ref 8.5–10.1)
CALCULATED P AXIS, ECG09: 50 DEGREES
CALCULATED R AXIS, ECG10: 17 DEGREES
CALCULATED T AXIS, ECG11: 84 DEGREES
CHLORIDE SERPL-SCNC: 104 MMOL/L (ref 97–108)
CO2 SERPL-SCNC: 30 MMOL/L (ref 21–32)
COLOR UR: ABNORMAL
CREAT SERPL-MCNC: 0.65 MG/DL (ref 0.55–1.02)
DIAGNOSIS, 93000: NORMAL
ECHO AO ASC DIAM: 3.1 CM
ECHO AO ASCENDING AORTA INDEX: 1.68 CM/M2
ECHO AV AREA PEAK VELOCITY: 1.5 CM2
ECHO AV AREA PEAK VELOCITY: 1.9 CM2
ECHO AV AREA PEAK VELOCITY: 1.9 CM2
ECHO AV AREA PEAK VELOCITY: 2.3 CM2
ECHO AV AREA VTI: 1.2 CM2
ECHO AV AREA VTI: 1.7 CM2
ECHO AV AREA VTI: 1.8 CM2
ECHO AV AREA VTI: 2.7 CM2
ECHO AV MEAN GRADIENT: 8 MMHG
ECHO AV MEAN VELOCITY: 1.3 M/S
ECHO AV PEAK GRADIENT: 15 MMHG
ECHO AV PEAK VELOCITY: 1.9 M/S
ECHO AV VELOCITY RATIO: 0.58
ECHO AV VTI: 37.7 CM
ECHO LA DIAMETER INDEX: 2.23 CM/M2
ECHO LA DIAMETER: 4.1 CM
ECHO LA VOL 2C: 80 ML (ref 22–52)
ECHO LA VOL 4C: 87 ML (ref 22–52)
ECHO LA VOL BP: 86 ML (ref 22–52)
ECHO LA VOL BP: 86 ML (ref 22–52)
ECHO LA VOLUME AREA LENGTH: 88 ML
ECHO LA VOLUME INDEX A2C: 43 ML/M2 (ref 16–34)
ECHO LA VOLUME INDEX A4C: 47 ML/M2 (ref 16–34)
ECHO LA VOLUME INDEX AREA LENGTH: 48 ML/M2 (ref 16–34)
ECHO LV E' LATERAL VELOCITY: 10 CM/S
ECHO LV E' SEPTAL VELOCITY: 6 CM/S
ECHO LV FRACTIONAL SHORTENING: 35 % (ref 28–44)
ECHO LV INTERNAL DIMENSION DIASTOLE INDEX: 2.77 CM/M2
ECHO LV INTERNAL DIMENSION DIASTOLIC: 5.1 CM (ref 3.9–5.3)
ECHO LV INTERNAL DIMENSION SYSTOLIC INDEX: 1.79 CM/M2
ECHO LV INTERNAL DIMENSION SYSTOLIC: 3.3 CM
ECHO LV IVSD: 1.1 CM (ref 0.6–0.9)
ECHO LV MASS 2D: 213.9 G (ref 67–162)
ECHO LV MASS INDEX 2D: 116.2 G/M2 (ref 43–95)
ECHO LV POSTERIOR WALL DIASTOLIC: 1.1 CM (ref 0.6–0.9)
ECHO LV RELATIVE WALL THICKNESS RATIO: 0.43
ECHO LVOT AREA: 3.5 CM2
ECHO LVOT AV VTI INDEX: 0.54
ECHO LVOT DIAM: 2.1 CM
ECHO LVOT MEAN GRADIENT: 3 MMHG
ECHO LVOT PEAK GRADIENT: 5 MMHG
ECHO LVOT PEAK VELOCITY: 1.1 M/S
ECHO LVOT STROKE VOLUME INDEX: 38.2 ML/M2
ECHO LVOT SV: 70.3 ML
ECHO LVOT VTI: 20.3 CM
ECHO MV A VELOCITY: 0.39 M/S
ECHO MV AREA VTI: 2.3 CM2
ECHO MV E DECELERATION TIME (DT): 151.8 MS
ECHO MV E VELOCITY: 1.95 M/S
ECHO MV E/A RATIO: 5
ECHO MV E/E' LATERAL: 19.5
ECHO MV E/E' RATIO (AVERAGED): 26
ECHO MV E/E' SEPTAL: 32.5
ECHO MV LVOT VTI INDEX: 1.53
ECHO MV MAX VELOCITY: 1.9 M/S
ECHO MV MEAN GRADIENT: 8 MMHG
ECHO MV MEAN VELOCITY: 1.3 M/S
ECHO MV PEAK GRADIENT: 15 MMHG
ECHO MV VTI: 31 CM
ECHO PV MAX VELOCITY: 0.8 M/S
ECHO PV PEAK GRADIENT: 2 MMHG
ECHO RV INTERNAL DIMENSION: 3.9 CM
ECHO RV TAPSE: 2.2 CM (ref 1.5–2)
ECHO TV REGURGITANT MAX VELOCITY: 2.5 M/S
ECHO TV REGURGITANT PEAK GRADIENT: 25 MMHG
EPITH CASTS URNS QL MICRO: ABNORMAL /LPF
EST. AVERAGE GLUCOSE BLD GHB EST-MCNC: 114 MG/DL
GLUCOSE SERPL-MCNC: 178 MG/DL (ref 65–100)
GLUCOSE UR STRIP.AUTO-MCNC: NEGATIVE MG/DL
HBA1C MFR BLD: 5.6 % (ref 4–5.6)
HGB UR QL STRIP: ABNORMAL
HYALINE CASTS URNS QL MICRO: ABNORMAL /LPF (ref 0–5)
KETONES UR QL STRIP.AUTO: NEGATIVE MG/DL
LEUKOCYTE ESTERASE UR QL STRIP.AUTO: ABNORMAL
MAGNESIUM SERPL-MCNC: 1.8 MG/DL (ref 1.6–2.4)
NITRITE UR QL STRIP.AUTO: POSITIVE
P-R INTERVAL, ECG05: 200 MS
PH UR STRIP: 7 [PH] (ref 5–8)
POTASSIUM SERPL-SCNC: 4 MMOL/L (ref 3.5–5.1)
PROT UR STRIP-MCNC: 30 MG/DL
Q-T INTERVAL, ECG07: 368 MS
QRS DURATION, ECG06: 88 MS
QTC CALCULATION (BEZET), ECG08: 477 MS
RBC #/AREA URNS HPF: ABNORMAL /HPF (ref 0–5)
SODIUM SERPL-SCNC: 139 MMOL/L (ref 136–145)
SP GR UR REFRACTOMETRY: 1.03 (ref 1–1.03)
UR CULT HOLD, URHOLD: NORMAL
UROBILINOGEN UR QL STRIP.AUTO: 0.2 EU/DL (ref 0.2–1)
VENTRICULAR RATE, ECG03: 101 BPM
WBC URNS QL MICRO: ABNORMAL /HPF (ref 0–4)

## 2021-12-23 PROCEDURE — 74011250636 HC RX REV CODE- 250/636: Performed by: INTERNAL MEDICINE

## 2021-12-23 PROCEDURE — 36415 COLL VENOUS BLD VENIPUNCTURE: CPT

## 2021-12-23 PROCEDURE — 94664 DEMO&/EVAL PT USE INHALER: CPT

## 2021-12-23 PROCEDURE — 93306 TTE W/DOPPLER COMPLETE: CPT

## 2021-12-23 PROCEDURE — 94640 AIRWAY INHALATION TREATMENT: CPT

## 2021-12-23 PROCEDURE — 83036 HEMOGLOBIN GLYCOSYLATED A1C: CPT

## 2021-12-23 PROCEDURE — 80048 BASIC METABOLIC PNL TOTAL CA: CPT

## 2021-12-23 PROCEDURE — 74011250637 HC RX REV CODE- 250/637: Performed by: INTERNAL MEDICINE

## 2021-12-23 PROCEDURE — 93306 TTE W/DOPPLER COMPLETE: CPT | Performed by: SPECIALIST

## 2021-12-23 PROCEDURE — 83735 ASSAY OF MAGNESIUM: CPT

## 2021-12-23 PROCEDURE — 81001 URINALYSIS AUTO W/SCOPE: CPT

## 2021-12-23 PROCEDURE — 2709999900 HC NON-CHARGEABLE SUPPLY

## 2021-12-23 PROCEDURE — 94761 N-INVAS EAR/PLS OXIMETRY MLT: CPT

## 2021-12-23 PROCEDURE — 77030029684 HC NEB SM VOL KT MONA -A

## 2021-12-23 PROCEDURE — 97165 OT EVAL LOW COMPLEX 30 MIN: CPT

## 2021-12-23 PROCEDURE — 97161 PT EVAL LOW COMPLEX 20 MIN: CPT

## 2021-12-23 PROCEDURE — 97116 GAIT TRAINING THERAPY: CPT

## 2021-12-23 PROCEDURE — 74011000250 HC RX REV CODE- 250: Performed by: INTERNAL MEDICINE

## 2021-12-23 PROCEDURE — 97530 THERAPEUTIC ACTIVITIES: CPT

## 2021-12-23 PROCEDURE — 97535 SELF CARE MNGMENT TRAINING: CPT

## 2021-12-23 RX ORDER — PREDNISONE 20 MG/1
40 TABLET ORAL DAILY
Qty: 8 TABLET | Refills: 0 | Status: SHIPPED | OUTPATIENT
Start: 2021-12-23 | End: 2021-12-27

## 2021-12-23 RX ORDER — IPRATROPIUM BROMIDE AND ALBUTEROL SULFATE 2.5; .5 MG/3ML; MG/3ML
3 SOLUTION RESPIRATORY (INHALATION)
Status: DISCONTINUED | OUTPATIENT
Start: 2021-12-23 | End: 2021-12-23 | Stop reason: HOSPADM

## 2021-12-23 RX ORDER — MICONAZOLE NITRATE 2 %
POWDER (GRAM) TOPICAL 2 TIMES DAILY
Status: DISCONTINUED | OUTPATIENT
Start: 2021-12-23 | End: 2021-12-23 | Stop reason: HOSPADM

## 2021-12-23 RX ORDER — CEFDINIR 300 MG/1
300 CAPSULE ORAL 2 TIMES DAILY
Qty: 14 CAPSULE | Refills: 0 | Status: SHIPPED | OUTPATIENT
Start: 2021-12-23 | End: 2021-12-30

## 2021-12-23 RX ORDER — CARVEDILOL 12.5 MG/1
12.5 TABLET ORAL 2 TIMES DAILY WITH MEALS
Status: DISCONTINUED | OUTPATIENT
Start: 2021-12-23 | End: 2021-12-23 | Stop reason: HOSPADM

## 2021-12-23 RX ADMIN — Medication 10 ML: at 05:06

## 2021-12-23 RX ADMIN — ENOXAPARIN SODIUM 40 MG: 100 INJECTION SUBCUTANEOUS at 08:05

## 2021-12-23 RX ADMIN — MICONAZOLE NITRATE 2 % TOPICAL POWDER: at 12:03

## 2021-12-23 RX ADMIN — METHYLPREDNISOLONE SODIUM SUCCINATE 40 MG: 40 INJECTION, POWDER, FOR SOLUTION INTRAMUSCULAR; INTRAVENOUS at 13:57

## 2021-12-23 RX ADMIN — CARVEDILOL 12.5 MG: 12.5 TABLET, FILM COATED ORAL at 15:58

## 2021-12-23 RX ADMIN — IPRATROPIUM BROMIDE AND ALBUTEROL SULFATE 3 ML: .5; 3 SOLUTION RESPIRATORY (INHALATION) at 14:59

## 2021-12-23 RX ADMIN — METHYLPREDNISOLONE SODIUM SUCCINATE 40 MG: 40 INJECTION, POWDER, FOR SOLUTION INTRAMUSCULAR; INTRAVENOUS at 05:06

## 2021-12-23 RX ADMIN — Medication 10 ML: at 12:04

## 2021-12-23 RX ADMIN — MICONAZOLE NITRATE 2 % TOPICAL POWDER: at 15:58

## 2021-12-23 RX ADMIN — IPRATROPIUM BROMIDE AND ALBUTEROL SULFATE 3 ML: .5; 3 SOLUTION RESPIRATORY (INHALATION) at 07:38

## 2021-12-23 RX ADMIN — CARVEDILOL 12.5 MG: 12.5 TABLET, FILM COATED ORAL at 08:05

## 2021-12-23 NOTE — DISCHARGE INSTRUCTIONS
HOSPITALIST DISCHARGE INSTRUCTIONS  NAME: Shaheed De Luna   :  1945   MRN:  [id number]     Date/Time:  2021 4:13 PM    ADMIT DATE: 2021     DISCHARGE DATE: 2021     ADMITTING DIAGNOSIS:  Acute Hypoxemic Respiratory Failure  Bronchitis due to Parainfluenza   UTI    DISCHARGE DIAGNOSIS:    You were admitted for treatment and of the above. You were found to have abnormally elevated markers of heart strain, which were related to low oxygen caused by bronchitis (airway inflammation), which was caused by parainfluenza virus. This is not influenza (\"The Flu\") but can cause significant illness in very young patients, very old patients, and immunocompromised patients. It spreads as the common cold would. You received IV steroids and will need to complete 4 more days of a pill version called prednisone. I will also write a prescription for an inhaler. You were also found to have a Urinary Tract Infection and will need to complete a course of an antibiotic called Cefdinir. You are weak from these illnesses and will need to complete Physical Therapy at home. Your strength will improve as the above illnesses are treated. MEDICATIONS:    · It is important that you take the medication exactly as they are prescribed. · Keep your medication in the bottles provided by the pharmacist and keep a list of the medication names, dosages, and times to be taken in your wallet. · Do not take other medications without consulting your doctor. If you experience any of the following symptoms then please call your primary care physician or return to the emergency room if you cannot get hold of your doctor:  Fever, chills, nausea, vomiting, diarrhea, change in mentation, falling, bleeding, shortness of breath    Follow Up:  Dr. Joan So, NP  you are to call and set up an appointment to see them in 1 week.       Information obtained by :  I understand that if any problems occur once I am at home I am to contact my physician. I understand and acknowledge receipt of the instructions indicated above.                                                                                                                                            Physician's or R.N.'s Signature                                                                  Date/Time                                                                                                                                              Patient or Representative Signature                                                          Date/Time

## 2021-12-23 NOTE — PROGRESS NOTES
4:34 PM  CM noted dc order and need for home health. Spoke to patient and patients daughter and they are agreeable. They used [facility] in the past and she would like to use them again. Sent referral and they accepted. Added to AVS. No further dc needs. Aline Gore      12/23/21  11:21 AM    Care Management Initial Evaluation:    CM reviewed EMR and met with patient in room to complete the initial evaluation. Confirmed charted demographics. Reason for Admission:  Parainfluenza infection  PMH of aortic stenosis, hyperglycemia, chronic peripheral edema, CAD status post stent to the RCA, aortic valve replacement                   RUR Score:    10%  Level: Low  Value Based Purchasing: No  Bundle: No          PAYOR: [place] Medicare A/B amd AARP (Secondary)           Plan for utilizing home health:  Used home health within the last year      PCP: First and Last name:  Yazmin Hess NP     Name of Practice:    Are you a current patient: Yes/No: Yes   Approximate date of last visit: within the last three months   Can you participate in a virtual visit with your PCP:                     Current Advanced Directive/Advance Care Plan: Full Code      Healthcare Decision Maker:   Click here to complete [address] including selection of the [address] Relationship (ie \"Primary\")  Bibi Olivases: Daughter-- [phone]  Dock : Daughter---- [phone]                         Transition of Care Plan:                    Home: Resides with her daughter - Linda Wolf in a one story private residence with 4 steps to enter  DME: RW and Linzie Lipoma- uses when feels unsteady on her feet  PTA: Independent with ADL's and iADLs  RX: [facility]    1). Patient admitted for medical management  2). PT/OT consulted  3). Family will transport at dc  4). CM will follow for dc needs    ARTURO Antoine  Care Manager    Care Management Interventions  PCP Verified by CM:  Yes (last saw within the last 3 months)  Mode of Transport at Discharge:  Other (see comment) (One of her children will transport at [facility])  Transition of Care Consult (CM Consult): Discharge Planning  Discharge Durable Medical Equipment: No  Physical Therapy Consult: Yes  Occupational Therapy Consult: Yes  Speech Therapy Consult: No  Support Systems: Child(codie)  Confirm Follow Up Transport: Family  Discharge Location  Discharge Placement: Home with family assistance

## 2021-12-23 NOTE — PROGRESS NOTES
Problem: Pressure Injury - Risk of  Goal: *Prevention of pressure injury  Description: Document Yahir Scale and appropriate interventions in the flowsheet. Outcome: Progressing Towards Goal  Note: Pressure Injury Interventions:  Sensory Interventions: Assess changes in LOC,Assess need for specialty bed,Avoid rigorous massage over bony prominences,Discuss PT/OT consult with provider,Float heels,Maintain/enhance activity level,Minimize linen layers,Monitor skin under medical devices,Pad between skin to skin,Turn and reposition approx. every two hours (pillows and wedges if needed)    Moisture Interventions: Absorbent underpads,Apply protective barrier, creams and emollients,Check for incontinence Q2 hours and as needed,Internal/External urinary devices,Limit adult briefs,Maintain skin hydration (lotion/cream),Minimize layers,Moisture barrier,Offer toileting Q_hr    Activity Interventions: Assess need for specialty bed,Increase time out of bed,PT/OT evaluation    Mobility Interventions: Assess need for specialty bed,PT/OT evaluation,Turn and reposition approx. every two hours(pillow and wedges)    Nutrition Interventions: Document food/fluid/supplement intake,Discuss nutritional consult with provider,Offer support with meals,snacks and hydration    Friction and Shear Interventions: Apply protective barrier, creams and emollients,HOB 30 degrees or less,Lift sheet,Minimize layers,Transfer aides:transfer board/Lo lift/ceiling lift,Transferring/repositioning devices                Problem: Patient Education: Go to Patient Education Activity  Goal: Patient/Family Education  Outcome: Progressing Towards Goal     Problem: Falls - Risk of  Goal: *Absence of Falls  Description: Document Tucker Fall Risk and appropriate interventions in the flowsheet.   Outcome: Progressing Towards Goal  Note: Fall Risk Interventions:  Mobility Interventions: Bed/chair exit alarm,Communicate number of staff needed for ambulation/transfer,OT consult for ADLs,Patient to call before getting OOB,PT Consult for mobility concerns,PT Consult for assist device competence,Utilize walker, cane, or other assistive device,Utilize gait belt for transfers/ambulation    Mentation Interventions: Adequate sleep, hydration, pain control,Bed/chair exit alarm,Evaluate medications/consider consulting pharmacy,Increase mobility,More frequent rounding,Reorient patient,Room close to nurse's station,Update white board    Medication Interventions: Bed/chair exit alarm,Evaluate medications/consider consulting pharmacy,Patient to call before getting OOB    Elimination Interventions: Bed/chair exit alarm,Call light in reach,Patient to call for help with toileting needs,Stay With Me (per policy),Toilet paper/wipes in reach,Toileting schedule/hourly rounds    History of Falls Interventions: Bed/chair exit alarm,Consult care management for discharge planning,Room close to nurse's station,Vital signs minimum Q4HRs X 24 hrs (comment for end date)         Problem: Patient Education: Go to Patient Education Activity  Goal: Patient/Family Education  Outcome: Progressing Towards Goal

## 2021-12-23 NOTE — PROGRESS NOTES
TRANSFER - OUT REPORT:    Verbal report given to NupurRN(name) on Marcia Tan  being transferred to 5th floor(unit) for routine progression of care       Report consisted of patients Situation, Background, Assessment and   Recommendations(SBAR). Information from the following report(s) SBAR, Kardex, Intake/Output, Recent Results, Med Rec Status and Cardiac Rhythm NSR/ST was reviewed with the receiving nurse. Lines:   Peripheral IV 12/22/21 Right;Upper Arm (Active)   Site Assessment Clean, dry, & intact 12/23/21 0800   Phlebitis Assessment 0 12/23/21 0800   Infiltration Assessment 0 12/23/21 0800   Dressing Status Clean, dry, & intact 12/23/21 0800   Dressing Type Transparent;Tape 12/23/21 0800   Hub Color/Line Status Pink;Flushed;Capped 12/23/21 0800   Action Taken Open ports on tubing capped 12/23/21 0800   Alcohol Cap Used Yes 12/23/21 0800        Opportunity for questions and clarification was provided.       Patient transported with:   Monitor  Registered Nurse

## 2021-12-23 NOTE — PROGRESS NOTES
Problem: Mobility Impaired (Adult and Pediatric)  Goal: *Acute Goals and Plan of Care (Insert Text)  Description: FUNCTIONAL STATUS PRIOR TO ADMISSION: Patient was modified independent using a rollator for functional mobility. HOME SUPPORT PRIOR TO ADMISSION: The patient lived with daughter and her family but did not require assist.    Physical Therapy Goals  Initiated 12/23/2021  1. Patient will move from supine to sit and sit to supine  in bed with modified independence within 7 day(s). 2.  Patient will transfer from bed to chair and chair to bed with supervision/set-up using the least restrictive device within 7 day(s). 3.  Patient will perform sit to stand with supervision/set-up within 7 day(s). 4.  Patient will ambulate with supervision/set-up for 100 feet with the least restrictive device within 7 day(s). 5.  Patient will ascend/descend 4 stairs with 1 handrail(s) with minimal assistance/contact guard assist within 7 day(s). Outcome: Progressing Towards Goal   PHYSICAL THERAPY EVALUATION  Patient: Merly Syed (29 y.o. female)  Date: 12/23/2021  Primary Diagnosis: Acute respiratory failure with hypoxia ([facility] [address] [J96.01]        Precautions:   Fall    ASSESSMENT  Based on the objective data described below, the patient presents with generalized weakness, impulsivity, decreased safety awareness, decreased balance, and decreased functional ambulation following admission for acute respiratory failure with hypoxia. Patient came to ED on 12/22 with cough, congestion, and decreased O2 sats. She is currently on room air and stable at  92-95% on room air, but BP elevated with activity (see doc flow sheets). Patient reports urgency with both bowel and bladder function and today was found with saturated brief. Currently being worked up for UTI and + for parainfluenza.  Patient should have assistance at all times with activity and discussed this with her daughter who assures she will have steady help at home. Patient would benefit from HHPT. Current Level of Function Impacting Discharge (mobility/balance): +1 min assist to ambulate in room and to restroom using rolling walker. Patient did have loss of balance x1 while approaching commode. Alert and oriented but needs cues for safety. Returned to bed due to elevated BP. Nursing aware. Functional Outcome Measure: The patient scored 40/100 on the BARTHEL outcome measure. Other factors to consider for discharge: none     Patient will benefit from skilled therapy intervention to address the above noted impairments. PLAN :  Recommendations and Planned Interventions: bed mobility training, transfer training, gait training, and therapeutic exercises      Frequency/Duration: Patient will be followed by physical therapy:  5 times a week to address goals. Recommendation for discharge: (in order for the patient to meet his/her long term goals)  Physical therapy at least 2 days/week in the home AND ensure assist and/or supervision for safety with all gait activity    This discharge recommendation:  Has been made in collaboration with the attending provider and/or case management    IF patient discharges home will need the following DME: patient owns DME required for discharge         SUBJECTIVE:   Patient stated I can't tell you everything.     OBJECTIVE DATA SUMMARY:   HISTORY:    Past Medical History:   Diagnosis Date    Aortic stenosis     Hypercholesterolemia     Neuropathy     Idiopathic peripheral    Skin problem      Past Surgical History:   Procedure Laterality Date    HX AORTIC VALVE REPLACEMENT  11/30/2018    TAVR    HX BLADDER REPAIR  07/24/2018    Unspecified procedure on bladder    HX BLADDER SUSPENSION  06/2011    Mesh bladder suspension for pelvic prolapse    HX CHOLECYSTECTOMY      HX CHOLECYSTECTOMY  01/23/2013    HX COLONOSCOPY  10/10/2018    HX CORONARY STENT PLACEMENT  09/25/2018    RCA    HX GI  2008 Open repair Zenker's diverticulum    HX GYN  07/24/2018    Endometrial biopsy, repair of perineum vaginal closure    HX KNEE ARTHROSCOPY  1958    HX LITHOTRIPSY Left 1987    HX ORTHOPAEDIC Right 06/15/2017    Right hip titanium vasiliy insertion after fracture    HX TOTAL COLECTOMY  11/09/2009    Partial colectomy due to diverticular disease       Personal factors and/or comorbidities impacting plan of care: decreased safety awareness, impulsive, fall risk    Home Situation  Home Environment: Private residence  # Steps to Enter: 6  Rails to Enter: Yes  Hand Rails : Bilateral  One/Two Story Residence: Two story, live on 1st floor  Living Alone: No  Support Systems: Child(codie)  Patient Expects to be Discharged to[de-identified] House  Current DME Used/Available at Home: Izell Sarks, rollator,Raised toilet seat,Cane, straight  Tub or Shower Type: Shower    EXAMINATION/PRESENTATION/DECISION MAKING:   Critical Behavior:  Neurologic State: Alert  Orientation Level: Oriented to place,Oriented to person,Oriented to time  Cognition: Follows commands  Safety/Judgement: Decreased insight into deficits,Decreased awareness of need for safety  Hearing: Auditory  Auditory Impairment: None  Skin:  not fully observed  Edema: to bilateral feet; LLE > RLE  Range Of Motion:  AROM: Within functional limits                       Strength:    Strength: Generally decreased, functional                    Tone & Sensation:   Tone: Normal              Sensation: Intact                     Functional Mobility:  Bed Mobility:     Supine to Sit: Additional time;Stand-by assistance  Sit to Supine: Contact guard assistance  Scooting: Contact guard assistance  Transfers:  Sit to Stand: Contact guard assistance  Stand to Sit: Contact guard assistance                       Balance:   Sitting: Intact; High guard  Standing: Intact; With support  Ambulation/Gait Training:  Distance (ft): 30 Feet (ft)  Assistive Device: Gait belt;Walker, rolling  Ambulation - Level of Assistance: Contact guard assistance;Minimal assistance        Gait Abnormalities: Path deviations                                                  Therapeutic Exercises: Ankle pumps    Functional Measure:  Barthel Index:    Bathin  Bladder: 5  Bowels: 5  Groomin  Dressin  Feeding: 10  Mobility: 0  Stairs: 0  Toilet Use: 5  Transfer (Bed to Chair and Back): 5  Total: 40/100       The Barthel ADL Index: Guidelines  1. The index should be used as a record of what a patient does, not as a record of what a patient could do. 2. The main aim is to establish degree of independence from any help, physical or verbal, however minor and for whatever reason. 3. The need for supervision renders the patient not independent. 4. A patient's performance should be established using the best available evidence. Asking the patient, friends/relatives and nurses are the usual sources, but direct observation and common sense are also important. However direct testing is not needed. 5. Usually the patient's performance over the preceding 24-48 hours is important, but occasionally longer periods will be relevant. 6. Middle categories imply that the patient supplies over 50 per cent of the effort. 7. Use of aids to be independent is allowed. Score Interpretation (from [address] [address])    Independent   60-79 Minimally independent   40-59 Partially dependent   20-39 Very dependent   <20 Totally dependent     -Fátima Ordnoez., Barthel, D.W. (1965). Functional evaluation: the Barthel Index. [address] [facility] ([address] (1997). The Barthel activities of daily living index: self-reporting versus actual performance in the old (> or = 75 years). Journal of [address] [place] [address] 45(7), [facility], JRANULFOPavel, Mimi Horan., [facility]. ().  Measuring the change in disability after inpatient rehabilitation; comparison of the responsiveness of the Barthel Index and Functional [place] Measure. Journal of Neurology, Neurosurgery, and Psychiatry, 66(4), 446-723. MICHELLE Cunningham, SHAUN Fernández, & Kim Bell M.A. (2004) Assessment of post-stroke quality of life in cost-effectiveness studies: The usefulness of the Barthel Index and the EuroQoL-5D. Quality of Life Research, 15, 373-72           Physical Therapy Evaluation Charge Determination   History Examination Presentation Decision-Making   MEDIUM  Complexity : 1-2 comorbidities / personal factors will impact the outcome/ POC  LOW Complexity : 1-2 Standardized tests and measures addressing body structure, function, activity limitation and / or participation in recreation  MEDIUM Complexity : Evolving with changing characteristics  Other outcome measures Barthel  MEDIUM      Based on the above components, the patient evaluation is determined to be of the following complexity level: LOW     Pain Rating:  None at this time    Activity Tolerance:   Fair and elevated BP with activity    After treatment patient left in no apparent distress:   Supine in bed, Call bell within reach, Bed / chair alarm activated, Caregiver / family present, and Side rails x 3    COMMUNICATION/EDUCATION:   The patients plan of care was discussed with: Occupational therapist, Registered nurse, and Case management. Fall prevention education was provided and the patient/caregiver indicated understanding. and Patient/family agree to work toward stated goals and plan of care.     Thank you for this referral.  Favian Hernandez, PT   Time Calculation: 38 mins

## 2021-12-23 NOTE — DISCHARGE SUMMARY
Physician Discharge Summary     Patient ID:  Jaden Roper  [id number]  45 y.o.  1945    Admit date: 12/22/2021    Discharge date and time: 12/23/2021    Admission Diagnoses: Acute respiratory failure with hypoxia [facility]) [J96.01]    Discharge Diagnoses:    Principal Problem:    Acute respiratory failure with hypoxia ([address] (12/22/2021)    Active Problems:    History of aortic valve replacement with tissue graft (11/18/2020)      HTN (hypertension), benign (11/18/2020)      Hypertriglyceridemia (11/18/2020)      Parainfluenza virus bronchitis (12/23/2021)      Coronary artery disease involving native coronary artery of native heart (12/23/2021)           Hospital Course:   Ms. Paco Pressley is a 67 y.o. female who is being admitted for Acute respiratory failure with hypoxia. Ms. Paco Pressley presented to the Emergency Department at the [facility] complaining of a productive cough associated with SOB and hypoxia. She is fully vaccinated against COVID-19 virus and has also received a booster dose. In the ED, she was noted to be dyspneic and wheezing. CTA chest done was neg for any acute changes but a RVP showed parainfluenza infection. COVID PCR neg. She will be admitted for further management. #AHRF: She presented requiring 2L nc but improved to room air at discharge. This was likely caused by acute bronchitis from parainfluenza. She will complete 4 more days of prednisone and was discharged with combivent. #UTI: Mild confusion, but not encephalopathic. Dysuria. Similar to prior per daughter. Will complete a course of cefdinir    #Elevated hsT/BNP: 2/2 demand in s/o above    #CAD: No chest pain despite the above.  Echo normal    #HTN: Carvedilol       PCP: Zayda Spears NP     Consults: None    Condition of patient at discharge: good    Discharge Exam:    Physical Exam:    Gen: Well-developed, well-nourished, in no acute distress  HEENT:  Pink conjunctivae, PERRL, hearing intact to voice, moist mucous membranes  Neck: Supple, without masses, thyroid non-tender  Resp: No accessory muscle use, clear breath sounds without wheezes rales or rhonchi  Card: No murmurs, normal S1, S2 without thrills, bruits or peripheral edema  Abd:  Soft, non-tender, non-distended, normoactive bowel sounds are present, no palpable organomegaly and no detectable hernias  Lymph:  No cervical or inguinal adenopathy  Musc: No cyanosis or clubbing  Skin: No rashes or ulcers, skin turgor is good  Neuro:  Cranial nerves are grossly intact, no focal motor weakness, follows commands appropriately  Psych:  Good insight, oriented to person, place and time, alert          Disposition: home    Patient Instructions:   Current Discharge Medication List      START taking these medications    Details   ipratropium-albuteroL (Combivent Respimat)  mcg/actuation inhaler Take 1 Puff by inhalation every six (6) hours for 30 days. Qty: 4 g, Refills: 0      predniSONE (DELTASONE) 20 mg tablet Take 40 mg by mouth daily for 4 days. Qty: 8 Tablet, Refills: 0      cefdinir (OMNICEF) 300 mg capsule Take 1 Capsule by mouth two (2) times a day for 7 days. Qty: 14 Capsule, Refills: 0         CONTINUE these medications which have NOT CHANGED    Details   gabapentin (NEURONTIN) 300 mg capsule TAKE 1 CAPSULE BY MOUTH EVERY MORNING AND TAKE 2 CAPSULES BY MOUTH EVERY EVENING  Qty: 90 Capsule, Refills: 0    Associated Diagnoses: Idiopathic peripheral autonomic neuropathy      furosemide (LASIX) 40 mg tablet Take 40 mg by mouth daily. Zenpep 40,000-126,000- 168,000 unit cpDR capsule TAKE ONE CAPSULE BY MOUTH THREE TIMES DAILY WITH MEALS AND ONE WITH SNACKS DAILY      Saccharomyces boulardii (Florastor) 250 mg capsule Take 250 mg by mouth two (2) times a day. colestipoL (COLESTID) 1 gram tablet TAKE 1 TO 2 TABLETS BY MOUTH AS NEEDED FOR DIARRHEA      atorvastatin (LIPITOR) 40 mg tablet Take 40 mg by mouth every evening. carvediloL (COREG) 25 mg tablet Take 12.5 mg by mouth two (2) times daily (with meals). multivitamin (ONE A DAY) tablet Take 1 Tab by mouth daily. pyridoxine, vitamin B6, (VITAMIN B-6) 100 mg tablet Take 100 mg by mouth daily. Activity: Activity as tolerated  Diet: Regular Diet  Wound Care: None needed    Follow-up with Jorge L Tovar NP in 1 week. Follow-up tests/labs None    Approximate time spent in patient care on day of discharge: 35 min    Signed:   Kit Thornton MD  12/23/2021  4:19 PM

## 2021-12-23 NOTE — PROGRESS NOTES
2030- TRANSFER - IN REPORT:    Verbal report received from Cici (name) on Irineo Kil  being received from [facility] ED (unit) for routine progression of care      Report consisted of patients Situation, Background, Assessment and   Recommendations(SBAR). Information from the following report(s) SBAR, Intake/Output and Recent Results was reviewed with the receiving nurse. Opportunity for questions and clarification was provided. 2300- Assessment completed upon patients arrival to unit and care assumed. This patient was assisted with Intentional Toileting every 2 hours during this shift as appropriate. Documentation of ambulation and output reflected on Flowsheet as appropriate. Purposeful hourly rounding was completed using AIDET and 5Ps. Outcomes of PHR documented as they occurred. Bed alarm in use as appropriate. Dual Suction and ambubag in place. 0730- Bedside and Verbal shift change report given to April/Graciela (oncoming nurse) by Zhen Love (offgoing nurse). Report included the following information SBAR, Intake/Output and Recent Results.

## 2021-12-23 NOTE — WOUND CARE
Wound Consult:  New Patient Visit. Chart reviewed. Consulted for redness pannus/buttocks POA. Spoke with patients nurse,  Chio James and in with patient this am.  Patient is resting on a Langlois bed with hercules mattress. Heels off loaded independently. Patient is alert, oriented x 4; requires no assistance to move side to side in bed. Yahir score 18; has urinary incontinence at baseline; wears pull up brief. Assessment:  Pannus - far sides bilaterally - pink, intact, macerated skin with light rash; MASD/intertrigo with candidiasis. Perianal ; inner lower buttocks - pink, macerated intact skin, blanching,  MASD/IAD. No injury to sacrum, heels. Treatment:  Z guard ointment applied to affected areas. Skin Care / PI Prevention Recommendations:  1. Minimize friction/shear: minimize layers of linen/pads under patient. 2. Off load pressure/reposition: currently independently turning. 3. Manage Moisture: barrier ointment, antifungal powder. 4. Continue to monitor nutrition, pain, and skin risk scale, and skin assessment. Plan:  Please re-consult should concerns arise despite continued skin/PI prevention measures.     Rod Cm, MSN, RN, [address] [facility], Wound / [address] [facility] Office [phone]

## 2021-12-23 NOTE — PROGRESS NOTES
0730 Bedside and Verbal shift change report given to Yunier Riley RN (oncoming nurse) by Henry aCn RN (offgoing nurse). Report included the following information SBAR and Kardex. This patient was assisted with Intentional Toileting every 2 hours during this shift as appropriate. Documentation of ambulation and output reflected on Flowsheet as appropriate. Purposeful hourly rounding was completed using AIDET and 5Ps. Outcomes of PHR documented as they occurred. Bed alarm in use as appropriate. Dual Suction and ambubag in place.

## 2021-12-23 NOTE — PROGRESS NOTES
Problem: Self Care Deficits Care Plan (Adult)  Goal: *Acute Goals and Plan of Care (Insert Text)  Description: FUNCTIONAL STATUS PRIOR TO ADMISSION: Patient was modified independent using a rollator for functional mobility. HOME SUPPORT PRIOR TO ADMISSION: The patient lived with daughter and her family but did not require assist.    Occupational Therapy Goals  Initiated 12/23/2021  1. Patient will perform lower body dressing with supervision/set-up within 7 day(s). 2.  Patient will perform grooming, standing at sink, with supervision/set-up within 7 day(s). 3.  Patient will perform bathing with supervision/set-up within 7 day(s). 4.  Patient will perform toilet transfers with supervision/set-up within 7 day(s). 5.  Patient will perform all aspects of toileting with supervision/set-up within 7 day(s). Outcome: Progressing Towards Goal   OCCUPATIONAL THERAPY EVALUATION  Patient: Demar Mccann (47 y.o. female)  Date: 12/23/2021  Primary Diagnosis: Acute respiratory failure with hypoxia ([facility] [address] [J96.01]        Precautions:   Fall    ASSESSMENT  Based on the objective data described below, the patient presents with hospital admission secondary to respiratory failure with hypoxia  patient received supine in bed, she is agreeable to activity. Patient daughter present in room. Patient demonstrates general weakness, decreased safety, impulsiveness and decreased balance with ADLs and transfers. Patient noted elevated BP throughout evaluation. Patient noted with saturated brief and requires min assist for hygiene. Patient to require assist x 1 and HH services at discharge . Current Level of Function Impacting Discharge (ADLs/self-care): CGA for transfers, up to [address] for ADLs    Functional Outcome Measure: The patient scored 40/100 on the Barthel Index outcome measure.       Other factors to consider for discharge: lives with daughter and family     Patient will benefit from skilled therapy intervention to address the above noted impairments. PLAN :  Recommendations and Planned Interventions: self care training, functional mobility training, therapeutic exercise, balance training, therapeutic activities, endurance activities, patient education, home safety training, and family training/education    Frequency/Duration: Patient will be followed by occupational therapy 5 times a week to address goals. Recommendation for discharge: (in order for the patient to meet his/her long term goals)  Occupational therapy at least 2 days/week in the home     This discharge recommendation:  Has been made in collaboration with the attending provider and/or case management    IF patient discharges home will need the following DME: none       SUBJECTIVE:   Patient stated Im ok.     OBJECTIVE DATA SUMMARY:   HISTORY:   Past Medical History:   Diagnosis Date    Aortic stenosis     Hypercholesterolemia     Neuropathy     Idiopathic peripheral    Skin problem      Past Surgical History:   Procedure Laterality Date    HX AORTIC VALVE REPLACEMENT  11/30/2018    TAVR    HX BLADDER REPAIR  07/24/2018    Unspecified procedure on bladder    HX BLADDER SUSPENSION  06/2011    Mesh bladder suspension for pelvic prolapse    HX CHOLECYSTECTOMY      HX CHOLECYSTECTOMY  01/23/2013    HX COLONOSCOPY  10/10/2018    HX CORONARY STENT PLACEMENT  09/25/2018    RCA    HX GI  2008    Open repair Zenker's diverticulum    HX GYN  07/24/2018    Endometrial biopsy, repair of perineum vaginal closure    HX KNEE ARTHROSCOPY  1958    HX LITHOTRIPSY Left 1987    HX ORTHOPAEDIC Right 06/15/2017    Right hip titanium vasiliy insertion after fracture    HX TOTAL COLECTOMY  11/09/2009    Partial colectomy due to diverticular disease       Expanded or extensive additional review of patient history:     Home Situation  Home Environment: Private residence  # Steps to Enter: 6  Rails to Enter: Yes  Hand Rails : Bilateral  One/Two Story Residence: [facility] story, live on 1st floor  Living Alone: No  Support Systems: Child(codie)  Patient Expects to be Discharged to[de-identified] House  Current DME Used/Available at Home: Si Ripa, rollator,Raised toilet seat,Cane, straight  Tub or Shower Type: Shower    Hand dominance: Right    EXAMINATION OF PERFORMANCE DEFICITS:  Cognitive/Behavioral Status:  Neurologic State: Alert  Orientation Level: Oriented to place;Oriented to person;Oriented to time  Cognition: Follows commands        Safety/Judgement: Decreased insight into deficits; Decreased awareness of need for safety    Skin: intact as seen    Edema: LEs     Hearing: Auditory  Auditory Impairment: None    Vision/Perceptual:                                     Range of Motion:  AROM: Within functional limits                         Strength:  Strength: Generally decreased, functional                Coordination: Tone & Sensation:  Tone: Normal  Sensation: Intact                      Balance:  Sitting: Intact; High guard  Standing: Intact; With support    Functional Mobility and Transfers for ADLs:  Bed Mobility:  Supine to Sit: Additional time;Stand-by assistance  Sit to Supine: Contact guard assistance  Scooting: Contact guard assistance    Transfers:  Sit to Stand: Contact guard assistance  Stand to Sit: Contact guard assistance  Bed to Chair: Contact guard assistance  Bathroom Mobility: Contact guard assistance  Toilet Transfer : Contact guard assistance  Assistive Device : Walker, rolling    ADL Assessment:  Feeding: Independent    Oral Facial Hygiene/Grooming: Setup    Bathing: Minimum assistance    Upper Body Dressing: Supervision    Lower Body Dressing: Contact guard assistance    Toileting: Minimum assistance                ADL Intervention and task modifications:                                     Cognitive Retraining  Safety/Judgement: Decreased insight into deficits; Decreased awareness of need for safety    Therapeutic Exercise:     Functional Measure:    Barthel Index:  Bathin  Bladder: 5  Bowels: 5  Groomin  Dressin  Feeding: 10  Mobility: 0  Stairs: 0  Toilet Use: 5  Transfer (Bed to Chair and Back): 5  Total: 40/100      The Barthel ADL Index: Guidelines  1. The index should be used as a record of what a patient does, not as a record of what a patient could do. 2. The main aim is to establish degree of independence from any help, physical or verbal, however minor and for whatever reason. 3. The need for supervision renders the patient not independent. 4. A patient's performance should be established using the best available evidence. Asking the patient, friends/relatives and nurses are the usual sources, but direct observation and common sense are also important. However direct testing is not needed. 5. Usually the patient's performance over the preceding 24-48 hours is important, but occasionally longer periods will be relevant. 6. Middle categories imply that the patient supplies over 50 per cent of the effort. 7. Use of aids to be independent is allowed. Score Interpretation (from [address] [address])    Independent   60-79 Minimally independent   40-59 Partially dependent   20-39 Very dependent   <20 Totally dependent     -Fátima Ordonez., Barthel, D.W. (1965). Functional evaluation: the Barthel Index. [address] [facility] ([address] (1997). The Barthel activities of daily living index: self-reporting versus actual performance in the old (> or = 75 years). Journal of [address] [place] [address] 45(7), [facility], J.J.M.F, Donte Gross., PascaleChatsworthjennie Glez. (1999). Measuring the change in disability after inpatient rehabilitation; comparison of the responsiveness of the Barthel Index and Functional Cowley Measure. Journal of Neurology, Neurosurgery, and Psychiatry, 66(4), 373-518.   -Yasmany Gamboa, N.SCOTT.A, SHAUN Fernández, & Ramon Rider M.A. (2004) Assessment of post-stroke quality of life in cost-effectiveness studies: The usefulness of the Barthel Index and the EuroQoL-5D. Quality of Life Research, 15, 159-17         Occupational Therapy Evaluation Charge Determination   History Examination Decision-Making   LOW Complexity : Brief history review  LOW Complexity : 1-3 performance deficits relating to physical, cognitive , or psychosocial skils that result in activity limitations and / or participation restrictions  LOW Complexity : No comorbidities that affect functional and no verbal or physical assistance needed to complete eval tasks       Based on the above components, the patient evaluation is determined to be of the following complexity level: LOW   Pain Rating:  None reported     Activity Tolerance:   Good    After treatment patient left in no apparent distress:    Supine in bed, Call bell within reach, Bed / chair alarm activated, Caregiver / family present, and Side rails x 3    COMMUNICATION/EDUCATION:   The patients plan of care was discussed with: Physical therapist and Registered nurse. Home safety education was provided and the patient/caregiver indicated understanding., Patient/family have participated as able in goal setting and plan of care. , and Patient/family agree to work toward stated goals and plan of care. This patients plan of care is appropriate for delegation to [facility].     Thank you for this referral.  Hernan Blackburn OTR/L  Time Calculation: 44 mins

## 2021-12-23 NOTE — H&P
[facility]  [address] [facility], [facility] [address]  [phone]    Admission History and Physical      NAME:     Taina Diana   :      [id number]   MRN:     [id number]     PCP:     Adebayo Tinsley NP     Date of Service: 2021     Chief  Complaint: Cough, SOB, cough    History Of Presenting Illness:       Ms. Emilia Gillette is a 67 y.o. female who is being admitted for Acute respiratory failure with hypoxia. Ms. Emilia Gillette presented to the Emergency Department at the [facility] today complaining of a productive cough associated with SOB and hypoxia. She is fully vaccinated against COVID-19 virus and has also received a booster dose. In the ED, she was noted to be dyspneic and wheezing. CTA chest done was neg for any acute changes but a RVP showed parainfluenza infection. COVID PCR neg. She will be admitted for further management. Allergies   Allergen Reactions    Crestor [Rosuvastatin] Myalgia    Dexlansoprazole Other (comments)    Keflex [Cephalexin] Diarrhea    Lipitor [Atorvastatin] Myalgia    Morphine Unknown (comments)    Niaspan [Niacin] Other (comments)     flushing    Sulfa (Sulfonamide Antibiotics) Swelling     Facial swelling    Tricor [Fenofibrate Micronized] Myalgia       Prior to Admission medications    Medication Sig Start Date End Date Taking? Authorizing Provider   gabapentin (NEURONTIN) 300 mg capsule TAKE 1 CAPSULE BY MOUTH EVERY MORNING AND TAKE 2 CAPSULES BY MOUTH EVERY EVENING 21  Yes Adebayo Tinsley NP   furosemide (LASIX) 40 mg tablet Take 40 mg by mouth daily. Yes Provider, Historical   Zenpep 40,000-126,000- 168,000 unit cpDR capsule TAKE ONE CAPSULE BY MOUTH THREE TIMES DAILY WITH MEALS AND ONE WITH SNACKS DAILY 21  Yes Provider, Historical   Saccharomyces boulardii (Florastor) 250 mg capsule Take 250 mg by mouth two (2) times a day.    Yes Provider, Historical   colestipoL (COLESTID) 1 gram tablet TAKE 1 TO 2 TABLETS BY MOUTH AS NEEDED FOR DIARRHEA 6/14/21  Yes Provider, Historical   atorvastatin (LIPITOR) 40 mg tablet Take 40 mg by mouth every evening. Yes Provider, Historical   carvediloL (COREG) 25 mg tablet Take 12.5 mg by mouth two (2) times daily (with meals). Yes Provider, Historical   multivitamin (ONE A DAY) tablet Take 1 Tab by mouth daily. Yes Provider, Historical   pyridoxine, vitamin B6, (VITAMIN B-6) 100 mg tablet Take 100 mg by mouth daily.    Yes Provider, Historical       Past Medical History:   Diagnosis Date    Aortic stenosis     Hypercholesterolemia     Neuropathy     Idiopathic peripheral    Skin problem         Past Surgical History:   Procedure Laterality Date    HX AORTIC VALVE REPLACEMENT  11/30/2018    TAVR    HX BLADDER REPAIR  07/24/2018    Unspecified procedure on bladder    HX BLADDER SUSPENSION  06/2011    Mesh bladder suspension for pelvic prolapse    HX CHOLECYSTECTOMY      HX CHOLECYSTECTOMY  01/23/2013    HX COLONOSCOPY  10/10/2018    HX CORONARY STENT PLACEMENT  09/25/2018    RCA    HX GI  2008    Open repair Zenker's diverticulum    HX GYN  07/24/2018    Endometrial biopsy, repair of perineum vaginal closure    HX KNEE ARTHROSCOPY  1958    HX LITHOTRIPSY Left 1987    HX ORTHOPAEDIC Right 06/15/2017    Right hip titanium vasiliy insertion after fracture    HX TOTAL COLECTOMY  11/09/2009    Partial colectomy due to diverticular disease       Social History     Tobacco Use    Smoking status: Current Every Day Smoker     Packs/day: 0.25     Years: 50.00     Pack years: 12.50     Types: Cigarettes    Smokeless tobacco: Never Used    Tobacco comment: 4-5 cigs a day currently, never smoked more than 1/2 PPD   Substance Use Topics    Alcohol use: Never        Family History   Problem Relation Age of Onset    Thyroid Disease Daughter     Kidney Disease Son         kidney stone    Stroke Mother     Heart Attack Father     Heart Attack Brother     Stroke Maternal Grandmother         Review of Systems:    Constitutional ROS: subjective fever, chills but no rigors or night sweats  Respiratory ROS: + cough, + sputum, - hemoptysis, + dyspnea and + pleuritic pain. Cardiovascular ROS: no chest pain, palpitations, orthopnea, PND or syncope  Endocrine ROS: no polydispsia, polyuria, heat or cold intolerance or major weight change. Gastrointestinal ROS: no dysphagia, abdominal pain, nausea, vomiting, diarrhea or any bleeding   Genito-Urinary ROS: no dysuria, frequency, hematuria, retention or flank pain  Musculoskeletal ROS: no joint pain, swelling or muscular tenderness  Neurological ROS: no headache, confusion, focal weakness or any other neurological symptoms  Psychiatric ROS: no depression, anxiety, mood swings  Dermatological ROS: no rash, pruritis, or urticaria    Examination:    Vital signs:    Visit Vitals  BP (!) 142/64 (BP 1 Location: Left lower arm, BP Patient Position: At rest;Lying right side)   Pulse 99   Temp 97.8 °F (36.6 °C)   Resp 14   Ht 5' 3\" (1.6 m)   Wt 73.9 kg (163 lb)   SpO2 96%   BMI 28.87 kg/m²         Physical Examination:    General:  Weak and ill looking patient in no acute distress  Eyes: Pink conjunctivae, PERRLA with no discharge. Ear, Nose & Throat: No ottorrhea, rhinorrhea and has dry mucous membranes  Respiratory:  No accessory muscle use, decreased breath sounds with scattered crackles. + wheezes  Cardiovascular:  No JVD or murmurs, regular and normal S1, S2 without thrills, bruits or peripheral edema  GI & :  Soft abdomen, non-tender, non-distended, normoactive bowel sounds with no palpable organomegaly  Heme:  No cervical or axillary adenopathy.    Musculoskeletal:  No cyanosis, clubbing, atrophy or deformities  Skin:  No rashes, bruising or ulcers   Neurological: Awake and alert, speech is clear, CNs 2-12 are grossly intact and otherwise non focal  Psychiatric:  Has a fair insight and is oriented x 3  _____________________________________________________________________    Data Review:    Labs:    Recent Labs     12/22/21  1323   WBC 6.4   HGB 12.7   HCT 40.1   *     Recent Labs     12/22/21  1323      K 3.9      CO2 34*   GLU 96   BUN 12   CREA 0.84   CA 8.0*   MG 1.7   ALB 2.8*   ALT 27     No components found for: GLPOC  No results for input(s): PH, PCO2, PO2, HCO3, FIO2 in the last 72 hours. Recent Labs     12/22/21  1323   INR 1.1       Radiological Studies:      Chest X-ray and CTA chest - reviewed     Personally reviewed EKG - sinus tachycardia with no acute ischemic changes     I have reviewed old medical records available. Assessment & Impression:     Ms. Cris Downing is a 67 y.o. female being evaluated for:     Principal Problem:    Acute respiratory failure with hypoxia ([address] (12/22/2021)    Active Problems:    History of aortic valve replacement with tissue graft (11/18/2020)      HTN (hypertension), benign (11/18/2020)      Hypertriglyceridemia (11/18/2020)      Parainfluenza virus bronchitis (12/23/2021)      Coronary artery disease involving native coronary artery of native heart (12/23/2021)         Plan of management:    Acute respiratory failure with hypoxia ([address] (12/22/2021) / Parainfluenza virus bronchitis (12/23/2021) POA: admit to hospital given persistent symptoms. Start IV Solumedrol, Nebs PRN, supplemental oxygen and titrate to keep sats > 90%    Coronary artery disease involving native coronary artery of native heart (12/23/2021) / History of aortic valve replacement with tissue graft (11/18/2020) POA: hsTroponin 140. Pro-BNP 1600. CXR and CTA chest neg for acute changes. Resume Coreg, Lipitor. Follow Echo    HTN (hypertension), benign (11/18/2020) POA: BP uncontrolled.  Resume Coreg    Hypertriglyceridemia (11/18/2020) POA: Resume Lipitor    Code Status:  Full    Surrogate decision maker: Family    Risk of deterioration: high      Total time spent for the care of the patient: [address] discussed with: Patient, Nursing Staff and the ED physician    Discussed:  Code Status, Care Plan and D/C Planning    Prophylaxis:  Lovenox    Probable Disposition:  Home w/Family           ___________________________________________________    Attending Physician: Luis Eduardo Conley MD

## 2021-12-23 NOTE — CONSULTS
Comprehensive Nutrition Assessment      Type and Reason for Visit: Initial,Consult    Nutrition Recommendations/Plan:   1. Adjusted diet for elevated BG and remote hx of DM  2. Ordered A1c - previous A1c from 2009  3. Added Ensure HP for ease of protein intake with lower CHO     Nutrition Assessment:       Pt admitted for Acute respiratory failure with hypoxia ([facility] [J96.01]. Pt  has a past medical history of Aortic stenosis, Hypercholesterolemia, Neuropathy, and Skin problem. .    Wound consult received. Noted pt wt has been stable over last 3 years. PO intake low currently. Monitor for additional needs. Pt would benefit from slight increase in protein intake and reduction in carbohydrates for blood sugar control. Check A1c. Document PO intake. NKFA. No reported chew/swallow difficulties. No n/v, c/d. Last BM 12/22. Wt Readings from Last 10 Encounters:   12/23/21 80.3 kg (177 lb)   10/29/21 81.4 kg (179 lb 6 oz)   09/30/21 79.9 kg (176 lb 2 oz)   09/20/21 80.6 kg (177 lb 9.6 oz)   09/16/21 79.4 kg (175 lb)   08/10/21 82.6 kg (182 lb)   01/13/21 79.1 kg (174 lb 6 oz)   11/18/20 78.5 kg (173 lb)   04/11/17 74.8 kg (165 lb)       Malnutrition Assessment:  Malnutrition Status: At risk for malnutrition (specify)    Context:  Reported low PO x 1 day. Monitor. Estimated Daily Nutrient Needs:  Energy (kcal): 1508; Weight Used for Energy Requirements: Current  Protein (g): 80; Weight Used for Protein Requirements: Current  Fluid (ml/day): 1500; Method Used for Fluid Requirements: 1 ml/kcal    Documented meal intake:   Patient Vitals for the past 168 hrs:   % Diet Eaten   12/23/21 1046 0%   12/23/21 0837 1 - 25%       Documented Supplement intake:  Patient Vitals for the past 168 hrs:   Supplement intake %   12/23/21 1046 0%       Nutrition Related Findings:     Last BM 12/22  Edema: 4+ pitting BLE. Nutritionally Significant Medications:   Lovenox, Solumedrol.       Wounds:    Moisture associate skin damage       Current Nutrition Therapies:  ADULT DIET Regular; 4 carb choices (60 gm/meal)  ADULT ORAL NUTRITION SUPPLEMENT Dinner, Breakfast; Low Calorie/High Protein    Anthropometric Measures:  · Height:  5' 2.99\" (160 cm)  · Current Body Wt:  80.3 kg (177 lb 0.5 oz)   · Admission Body Wt:   177 lbs    · Usual Body Wt:   175 lbs     · Ideal Body Wt:  115 lbs:  153.9 %    · BMI Category:  Obese class 1 (BMI 30.0-34. 9)       Nutrition Diagnosis:   · Inadequate oral intake related to food and nutrition related knowledge deficit as evidenced by intake 0-25%      Nutrition Interventions:   Food and/or Nutrient Delivery: Modify current diet,Start oral nutrition supplement  Nutrition Education and Counseling: No recommendations at this time  Coordination of Nutrition Care: Continue to monitor while inpatient,Interdisciplinary rounds    Goals:  PO >50% of meals and 1-2 ONS per day within 3-5 days       Nutrition Monitoring and Evaluation:   Behavioral-Environmental Outcomes: None identified  Food/Nutrient Intake Outcomes: Food and nutrient intake,Supplement intake  Physical Signs/Symptoms Outcomes: Biochemical data,Weight,Skin    Discharge Planning:    Continue oral nutrition supplement,Continue current diet     Electronically signed by Darius Boothe, [address]     Contact: [phone]

## 2022-01-01 NOTE — PROGRESS NOTES
Physician Progress Note      PATIENT:               Ana Clark  CSN #:                  [id number]  :                       1945  ADMIT DATE:       2021 12:39 PM  [address] [place] DATE:        2021 6:55 PM  RESPONDING  PROVIDER #:        Smith Arreguin MD          QUERY TEXT:    Patient admitted with Acute respiratory failure with hypoxia. In order to support the diagnosis of acute respiratory failure with hypoxia, please include additional clinical indicators in your documentation. Or please document if the diagnosis of acute respiratory failure with hypoxia has been ruled out after further study. The medical record reflects the following:  Risk Factors: Acute bronchitis    Clinical Indicators:  Patient was admitted with acute respiratory failure, COPD exacerbation and acute bronchitis from parainfluenza. They were noted have hypoxia with EMS and progress notes stated O2 sats fluctuated from 85 to low 90s in the ER. However, the record noted, \"no respiratory distress. \"    ABG's  pH 7.37  pCO2 49.6  pO2 86    Treatment:  Solumedrol and Duonebs    Acute Respiratory Failure Clinical Indicators per 3[facility] MS-[employer] Training Guide and Quick Reference Guide:  pO2 < 60 mmHg or SpO2 (pulse oximetry) < 91% breathing room air  pCO2 > 50 and pH < 7.35  P/F ratio (pO2 / FIO2) < 300  pO2 decrease or pCO2 increase by 10 mmHg from baseline (if known)  Supplemental oxygen of 40% or more  Presence of respiratory distress, tachypnea, dyspnea, shortness of breath, wheezing  Unable to speak in complete sentences  Use of accessory muscles to breathe  Extreme anxiety and feeling of impending doom  Tripod position  Confusion/altered mental status/obtunded  Options provided:  -- Acute Respiratory Failure with hypoxia as evidenced by, Please document evidence.   -- Acute Respiratory Failure with hypoxia ruled out after study  -- Other - I will add my own diagnosis  -- Disagree - Not applicable / Not valid  -- Disagree - Clinically unable to determine / Unknown  -- Refer to Clinical Documentation Reviewer    PROVIDER RESPONSE TEXT:    This patient is in acute respiratory failure with hypoxia as evidenced by SPO2 < 90% on Room air, required 2L nc    Query created by: Peter Trevino on 12/30/2021 10:06 AM      Electronically signed by:  Rick Campuzaon MD 1/1/2022 3:33 PM

## 2022-01-19 DIAGNOSIS — G90.09 IDIOPATHIC PERIPHERAL AUTONOMIC NEUROPATHY: ICD-10-CM

## 2022-01-20 RX ORDER — GABAPENTIN 300 MG/1
CAPSULE ORAL
Qty: 90 CAPSULE | Refills: 0 | Status: SHIPPED | OUTPATIENT
Start: 2022-01-20 | End: 2022-03-01

## 2022-02-14 ENCOUNTER — TELEPHONE (OUTPATIENT)
Dept: FAMILY MEDICINE CLINIC | Age: 77
End: 2022-02-14

## 2022-02-14 NOTE — TELEPHONE ENCOUNTER
Patient stated that she really needs to come in for a knee injection but I do not see any openings for Aniket until April.

## 2022-02-14 NOTE — TELEPHONE ENCOUNTER
See if pt would like to come in then and if needs something sooner we can ask if we can use a same day.

## 2022-02-14 NOTE — TELEPHONE ENCOUNTER
His next available in person appointment is 3/8/2022. All he has left are same day appointments and virtuals.

## 2022-02-24 ENCOUNTER — TELEPHONE (OUTPATIENT)
Dept: FAMILY MEDICINE CLINIC | Age: 77
End: 2022-02-24

## 2022-02-24 ENCOUNTER — OFFICE VISIT (OUTPATIENT)
Dept: FAMILY MEDICINE CLINIC | Age: 77
End: 2022-02-24
Payer: MEDICARE

## 2022-02-24 VITALS
HEART RATE: 90 BPM | RESPIRATION RATE: 16 BRPM | WEIGHT: 177 LBS | HEIGHT: 62 IN | SYSTOLIC BLOOD PRESSURE: 143 MMHG | OXYGEN SATURATION: 99 % | DIASTOLIC BLOOD PRESSURE: 92 MMHG | TEMPERATURE: 97.3 F | BODY MASS INDEX: 32.57 KG/M2

## 2022-02-24 DIAGNOSIS — M25.561 CHRONIC PAIN OF RIGHT KNEE: Primary | ICD-10-CM

## 2022-02-24 DIAGNOSIS — M17.11 ARTHRITIS OF RIGHT KNEE: ICD-10-CM

## 2022-02-24 DIAGNOSIS — G89.29 CHRONIC PAIN OF RIGHT KNEE: Primary | ICD-10-CM

## 2022-02-24 PROCEDURE — G8432 DEP SCR NOT DOC, RNG: HCPCS | Performed by: STUDENT IN AN ORGANIZED HEALTH CARE EDUCATION/TRAINING PROGRAM

## 2022-02-24 PROCEDURE — G8400 PT W/DXA NO RESULTS DOC: HCPCS | Performed by: STUDENT IN AN ORGANIZED HEALTH CARE EDUCATION/TRAINING PROGRAM

## 2022-02-24 PROCEDURE — 1090F PRES/ABSN URINE INCON ASSESS: CPT | Performed by: STUDENT IN AN ORGANIZED HEALTH CARE EDUCATION/TRAINING PROGRAM

## 2022-02-24 PROCEDURE — G8427 DOCREV CUR MEDS BY ELIG CLIN: HCPCS | Performed by: STUDENT IN AN ORGANIZED HEALTH CARE EDUCATION/TRAINING PROGRAM

## 2022-02-24 PROCEDURE — 20610 DRAIN/INJ JOINT/BURSA W/O US: CPT | Performed by: STUDENT IN AN ORGANIZED HEALTH CARE EDUCATION/TRAINING PROGRAM

## 2022-02-24 PROCEDURE — 99213 OFFICE O/P EST LOW 20 MIN: CPT | Performed by: STUDENT IN AN ORGANIZED HEALTH CARE EDUCATION/TRAINING PROGRAM

## 2022-02-24 PROCEDURE — G8753 SYS BP > OR = 140: HCPCS | Performed by: STUDENT IN AN ORGANIZED HEALTH CARE EDUCATION/TRAINING PROGRAM

## 2022-02-24 PROCEDURE — G8417 CALC BMI ABV UP PARAM F/U: HCPCS | Performed by: STUDENT IN AN ORGANIZED HEALTH CARE EDUCATION/TRAINING PROGRAM

## 2022-02-24 PROCEDURE — G8755 DIAS BP > OR = 90: HCPCS | Performed by: STUDENT IN AN ORGANIZED HEALTH CARE EDUCATION/TRAINING PROGRAM

## 2022-02-24 PROCEDURE — G8536 NO DOC ELDER MAL SCRN: HCPCS | Performed by: STUDENT IN AN ORGANIZED HEALTH CARE EDUCATION/TRAINING PROGRAM

## 2022-02-24 PROCEDURE — 1101F PT FALLS ASSESS-DOCD LE1/YR: CPT | Performed by: STUDENT IN AN ORGANIZED HEALTH CARE EDUCATION/TRAINING PROGRAM

## 2022-02-24 RX ORDER — TRIAMCINOLONE ACETONIDE 40 MG/ML
40 INJECTION, SUSPENSION INTRA-ARTICULAR; INTRAMUSCULAR ONCE
Status: COMPLETED | OUTPATIENT
Start: 2022-02-24 | End: 2022-02-24

## 2022-02-24 RX ORDER — LIDOCAINE HYDROCHLORIDE 10 MG/ML
4 INJECTION INFILTRATION; PERINEURAL ONCE
Status: COMPLETED | OUTPATIENT
Start: 2022-02-24 | End: 2022-02-24

## 2022-02-24 RX ORDER — DICLOFENAC SODIUM 75 MG/1
75 TABLET, DELAYED RELEASE ORAL 2 TIMES DAILY
Qty: 60 TABLET | Refills: 1 | Status: SHIPPED | OUTPATIENT
Start: 2022-02-24 | End: 2022-05-25 | Stop reason: ALTCHOICE

## 2022-02-24 RX ADMIN — LIDOCAINE HYDROCHLORIDE 4 ML: 10 INJECTION INFILTRATION; PERINEURAL at 18:26

## 2022-02-24 RX ADMIN — TRIAMCINOLONE ACETONIDE 40 MG: 40 INJECTION, SUSPENSION INTRA-ARTICULAR; INTRAMUSCULAR at 18:25

## 2022-02-24 NOTE — PATIENT INSTRUCTIONS
Learning About Joint Injections  What are joint injections? Joint injections are shots into a joint, such as the knee or shoulder. They are used to put in medicines, such as pain relievers and steroid medicines. Steroids can help reduce inflammation. A steroid shot can sometimes help with short-term pain relief when other treatments haven't worked. How are they done? First, the area over the joint will be cleaned. Your doctor may then use a tiny needle to numb the skin in the area where you will get the joint injection. If a tiny needle is used to numb the area, your doctor will use another needle to inject the medicine. Your doctor may use a pain reliever, a steroid, or both. You may feel some pressure or discomfort. Your doctor may put ice on the area before you go home. What can you expect after a joint injection? You will probably go home soon after your shot. You may have numbness around the joint for a few hours. If your shot included both a pain reliever and a steroid, then the pain will probably go away right away. But it might come back after a few hours. This might happen if the pain reliever wears off and the steroid hasn't started to work yet. Steroids don't always work. But when they do, the pain relief can last for several days to a few months or longer. Your doctor may tell you to use ice on the area. You can also use ice if the pain comes back. Put ice or a cold pack on your joint for 10 to 20 minutes at a time. Put a thin cloth between the ice and your skin. Follow your doctor's instructions carefully. Follow-up care is a key part of your treatment and safety. Be sure to make and go to all appointments, and call your doctor if you are having problems. It's also a good idea to know your test results and keep a list of the medicines you take. Where can you learn more?   Go to http://www.gray.com/  Enter N961 in the search box to learn more about \"Learning About Joint Injections. \"  Current as of: July 1, 2021               Content Version: 13.0  © 4895-5232 Healthwise, [employer]. Care instructions adapted under license by [employer] (which disclaims liability or warranty for this information). If you have questions about a medical condition or this instruction, always ask your healthcare professional. [place] [address] any warranty or liability for your use of this information.

## 2022-02-24 NOTE — PROGRESS NOTES
Chief Complaint   Patient presents with    Follow-up     right knee injection     Visit Vitals  BP (!) 143/92 (BP 1 Location: Left upper arm, BP Patient Position: Sitting)   Pulse 90   Temp 97.3 °F (36.3 °C) (Axillary)   Resp 16   Ht 5' 2\" (1.575 m)   Wt 177 lb (80.3 kg)   SpO2 99%   BMI 32.37 kg/m²     1. Have you been to the ER, urgent care clinic since your last visit? Hospitalized since your last visit? No    2. Have you seen or consulted any other health care providers outside of the [address] [place] [address] since your last visit? Include any pap smears or colon screening.  No

## 2022-02-24 NOTE — PROGRESS NOTES
Allyn Álvarez (: 1945) is a 65 y.o. female, established patient, here for evaluation of the following chief complaint(s):  Follow-up (right knee injection)       ASSESSMENT/PLAN:  Below is the assessment and plan developed based on review of pertinent history, physical exam, labs, studies, and medications. 1. Chronic pain of right knee  2. Arthritis of right knee  -     diclofenac EC (VOLTAREN) 75 mg EC tablet; Take 1 Tablet by mouth two (2) times a day., Normal, Disp-60 Tablet, R-1  -     MT DRAIN/INJECT LARGE JOINT/BURSA  -     triamcinolone acetonide (KENALOG-40) 40 mg/mL injection 40 mg; 40 mg, Intra artICUlar, ONCE, 1 dose, On u 22 at 1900  -     lidocaine (XYLOCAINE) 10 mg/mL (1 %) injection 4 mL; 4 mL, IntraMUSCular, ONCE, 1 dose, On u 22 at 1900    Indications:   Right knee arthritis    Procedure:  After consent was obtained, using sterile technique the right knee joint was prepped using Betadine and alcohol. Local anesthetic used: Cryospray. Kenalog 40 mg was mixed with 1% lidocaine for ml  and injected into the joint (anterolateral approach)and the needle withdrawn. The procedure was well tolerated. The patient is asked to continue to rest the joint for a few more days before resuming regular activities. It may be more painful for the first 1-2 days. Watch for fever, or increased swelling or persistent pain in the joint. Call or return to clinic prn if such symptoms occur or there is failure to improve as anticipated. SUBJECTIVE/OBJECTIVE:    65-year-old female presents for right knee injection. She has history of bilateral knee pain and bilateral arthritis. Pain is worse in the right knee. Last knee injection was done about 5 months ago. No history of knee replacement. Uses Advil which is not helping much. She would like to try Voltaren tablets. No history of kidney disease or gastric ulcers.     Review of Systems   Constitutional: Negative for activity change. Musculoskeletal:        Rt knee pain       Physical Exam  Vitals reviewed. Constitutional:       Appearance: Normal appearance. Comments: With walker   Musculoskeletal:      Right knee: Tenderness present over the medial joint line. Neurological:      Mental Status: She is alert. An electronic signature was used to authenticate this note.   -- Loco Chandler MD

## 2022-02-24 NOTE — TELEPHONE ENCOUNTER
Patient was checking out from seeing Dr Nivia Verdin and was told she was overdue for an appointment with Shimon Benson. She thought she had just recently seen francisco javier. Told her we would check and if she was due for an appt, we would call her and schedule it for her.

## 2022-02-25 NOTE — TELEPHONE ENCOUNTER
Her [facility] exam is due now. It can be pushed out until her next Gabapentin follow up which is due in April.

## 2022-03-18 PROBLEM — Z95.4 HISTORY OF AORTIC VALVE REPLACEMENT WITH TISSUE GRAFT: Status: ACTIVE | Noted: 2020-11-18

## 2022-03-18 PROBLEM — G90.09 IDIOPATHIC PERIPHERAL AUTONOMIC NEUROPATHY: Status: ACTIVE | Noted: 2020-11-18

## 2022-03-18 PROBLEM — I25.10 CORONARY ARTERY DISEASE INVOLVING NATIVE CORONARY ARTERY OF NATIVE HEART: Status: ACTIVE | Noted: 2021-12-23

## 2022-03-19 PROBLEM — I10 HTN (HYPERTENSION), BENIGN: Status: ACTIVE | Noted: 2020-11-18

## 2022-03-19 PROBLEM — J96.01 ACUTE RESPIRATORY FAILURE WITH HYPOXIA (HCC): Status: ACTIVE | Noted: 2021-12-22

## 2022-03-19 PROBLEM — M17.0 PRIMARY OSTEOARTHRITIS OF BOTH KNEES: Status: ACTIVE | Noted: 2020-11-18

## 2022-03-19 PROBLEM — L40.9 PSORIASIS: Status: ACTIVE | Noted: 2020-11-18

## 2022-03-19 PROBLEM — E78.1 HYPERTRIGLYCERIDEMIA: Status: ACTIVE | Noted: 2020-11-18

## 2022-03-19 PROBLEM — J20.4 PARAINFLUENZA VIRUS BRONCHITIS: Status: ACTIVE | Noted: 2021-12-23

## 2022-04-12 ENCOUNTER — TELEPHONE (OUTPATIENT)
Dept: FAMILY MEDICINE CLINIC | Age: 77
End: 2022-04-12

## 2022-04-12 NOTE — TELEPHONE ENCOUNTER
----- Message from Lisset Chantal sent at 4/12/2022  3:35 PM EDT -----  Subject: Message to Provider    QUESTIONS  Information for Provider? PT would like someone to call her and explain to   her what the fasting labs means and when she has to stop eating and   drinking before her apt on 05/25   ---------------------------------------------------------------------------  --------------  [address]  What is the best way for the office to contact you? OK to leave message on   voicemail  Preferred Call Back Phone Number? [phone]  ---------------------------------------------------------------------------  --------------  SCRIPT ANSWERS  Relationship to Patient?  Self

## 2022-04-13 DIAGNOSIS — G90.09 IDIOPATHIC PERIPHERAL AUTONOMIC NEUROPATHY: ICD-10-CM

## 2022-04-14 RX ORDER — GABAPENTIN 300 MG/1
CAPSULE ORAL
Qty: 90 CAPSULE | Refills: 0 | Status: SHIPPED | OUTPATIENT
Start: 2022-04-14 | End: 2022-05-16

## 2022-05-15 DIAGNOSIS — G90.09 IDIOPATHIC PERIPHERAL AUTONOMIC NEUROPATHY: ICD-10-CM

## 2022-05-16 RX ORDER — GABAPENTIN 300 MG/1
CAPSULE ORAL
Qty: 90 CAPSULE | Refills: 0 | Status: SHIPPED | OUTPATIENT
Start: 2022-05-16 | End: 2022-06-23

## 2022-05-25 ENCOUNTER — OFFICE VISIT (OUTPATIENT)
Dept: FAMILY MEDICINE CLINIC | Age: 77
End: 2022-05-25
Payer: MEDICARE

## 2022-05-25 VITALS
HEIGHT: 62 IN | DIASTOLIC BLOOD PRESSURE: 76 MMHG | WEIGHT: 176.4 LBS | HEART RATE: 56 BPM | TEMPERATURE: 97.5 F | SYSTOLIC BLOOD PRESSURE: 130 MMHG | BODY MASS INDEX: 32.46 KG/M2 | OXYGEN SATURATION: 97 %

## 2022-05-25 DIAGNOSIS — F51.01 PRIMARY INSOMNIA: ICD-10-CM

## 2022-05-25 DIAGNOSIS — E78.1 HYPERTRIGLYCERIDEMIA: ICD-10-CM

## 2022-05-25 DIAGNOSIS — F17.210 TOBACCO DEPENDENCE DUE TO CIGARETTES: ICD-10-CM

## 2022-05-25 DIAGNOSIS — I10 HTN (HYPERTENSION), BENIGN: ICD-10-CM

## 2022-05-25 DIAGNOSIS — E66.09 CLASS 1 OBESITY DUE TO EXCESS CALORIES WITH SERIOUS COMORBIDITY AND BODY MASS INDEX (BMI) OF 32.0 TO 32.9 IN ADULT: ICD-10-CM

## 2022-05-25 DIAGNOSIS — Z13.39 ALCOHOL SCREENING: ICD-10-CM

## 2022-05-25 DIAGNOSIS — Z00.00 MEDICARE ANNUAL WELLNESS VISIT, SUBSEQUENT: Primary | ICD-10-CM

## 2022-05-25 DIAGNOSIS — Z11.59 ENCOUNTER FOR HEPATITIS C SCREENING TEST FOR LOW RISK PATIENT: ICD-10-CM

## 2022-05-25 DIAGNOSIS — Z53.20 OSTEOPOROSIS SCREENING DECLINED: ICD-10-CM

## 2022-05-25 DIAGNOSIS — Z13.31 DEPRESSION SCREENING: ICD-10-CM

## 2022-05-25 DIAGNOSIS — Z23 ENCOUNTER FOR IMMUNIZATION: ICD-10-CM

## 2022-05-25 DIAGNOSIS — G90.09 IDIOPATHIC PERIPHERAL AUTONOMIC NEUROPATHY: ICD-10-CM

## 2022-05-25 DIAGNOSIS — Z91.81 AT MODERATE RISK FOR FALL: ICD-10-CM

## 2022-05-25 PROBLEM — I50.811 ACUTE RIGHT-SIDED CONGESTIVE HEART FAILURE (HCC): Status: RESOLVED | Noted: 2022-05-25 | Resolved: 2022-05-25

## 2022-05-25 PROBLEM — J96.01 ACUTE RESPIRATORY FAILURE WITH HYPOXIA (HCC): Status: RESOLVED | Noted: 2021-12-22 | Resolved: 2022-05-25

## 2022-05-25 PROBLEM — J44.1 CHRONIC OBSTRUCTIVE PULMONARY DISEASE WITH ACUTE EXACERBATION (HCC): Status: ACTIVE | Noted: 2022-05-25

## 2022-05-25 PROBLEM — I50.811 ACUTE RIGHT-SIDED CONGESTIVE HEART FAILURE (HCC): Status: ACTIVE | Noted: 2022-05-25

## 2022-05-25 PROBLEM — J44.1 CHRONIC OBSTRUCTIVE PULMONARY DISEASE WITH ACUTE EXACERBATION (HCC): Status: RESOLVED | Noted: 2022-05-25 | Resolved: 2022-05-25

## 2022-05-25 PROCEDURE — G8400 PT W/DXA NO RESULTS DOC: HCPCS | Performed by: NURSE PRACTITIONER

## 2022-05-25 PROCEDURE — 1101F PT FALLS ASSESS-DOCD LE1/YR: CPT | Performed by: NURSE PRACTITIONER

## 2022-05-25 PROCEDURE — 1123F ACP DISCUSS/DSCN MKR DOCD: CPT | Performed by: NURSE PRACTITIONER

## 2022-05-25 PROCEDURE — G8432 DEP SCR NOT DOC, RNG: HCPCS | Performed by: NURSE PRACTITIONER

## 2022-05-25 PROCEDURE — 1090F PRES/ABSN URINE INCON ASSESS: CPT | Performed by: NURSE PRACTITIONER

## 2022-05-25 PROCEDURE — 99214 OFFICE O/P EST MOD 30 MIN: CPT | Performed by: NURSE PRACTITIONER

## 2022-05-25 PROCEDURE — G8427 DOCREV CUR MEDS BY ELIG CLIN: HCPCS | Performed by: NURSE PRACTITIONER

## 2022-05-25 PROCEDURE — G0439 PPPS, SUBSEQ VISIT: HCPCS | Performed by: NURSE PRACTITIONER

## 2022-05-25 PROCEDURE — G8752 SYS BP LESS 140: HCPCS | Performed by: NURSE PRACTITIONER

## 2022-05-25 PROCEDURE — G8754 DIAS BP LESS 90: HCPCS | Performed by: NURSE PRACTITIONER

## 2022-05-25 PROCEDURE — G8536 NO DOC ELDER MAL SCRN: HCPCS | Performed by: NURSE PRACTITIONER

## 2022-05-25 PROCEDURE — G8417 CALC BMI ABV UP PARAM F/U: HCPCS | Performed by: NURSE PRACTITIONER

## 2022-05-25 RX ORDER — NYSTATIN 100000 U/G
CREAM TOPICAL
COMMUNITY

## 2022-05-25 RX ORDER — BUDESONIDE 3 MG/1
CAPSULE, COATED PELLETS ORAL
COMMUNITY

## 2022-05-25 NOTE — PATIENT INSTRUCTIONS
Receive Shingrix and Tenivac vaccines from pharmacy. Ask about any copays prior to receiving vaccines. Separate Shingrix and COVID vaccines by two weeks. Learning About Benefits From Quitting Smoking  How does quitting smoking make you healthier? If you're thinking about quitting smoking, you may have a few reasons to be smoke-free. Your health may be one of them. · When you quit smoking, you lower your risks for cancer, lung disease, heart attack, stroke, blood vessel disease, and blindness from macular degeneration. · When you're smoke-free, you get sick less often, and you heal faster. You are less likely to get colds, flu, bronchitis, and pneumonia. · As a nonsmoker, you may find that your mood is better and you are less stressed. When and how will you feel healthier? Quitting has real health benefits that start from day 1 of being smoke-free. And the longer you stay smoke-free, the healthier you get and the better you feel. The first hours  · After just 20 minutes, your blood pressure and heart rate go down. That means there's less stress on your heart and blood vessels. · Within 12 hours, the level of carbon monoxide in your blood drops back to normal. That makes room for more oxygen. With more oxygen in your body, you may notice that you have more energy than when you smoked. After 2 weeks  · Your lungs start to work better. · Your risk of heart attack starts to drop. After 1 month  · When your lungs are clear, you cough less and breathe deeper, so it's easier to be active. · Your sense of taste and smell return. That means you can enjoy food more than you have since you started smoking. Over the years  · Over the years, your risks of heart disease, heart attack, and stroke are lower. · After 10 years, your risk of dying from lung cancer is cut by about half. And your risk for many other types of cancer is lower too. How would quitting help others in your life?   When you quit smoking, you improve the health of everyone who now breathes in your smoke. · Their heart, lung, and cancer risks drop, much like yours. · They are sick less. For babies and small children, living smoke-free means they're less likely to have ear infections, pneumonia, and bronchitis. · If you're a woman who is or will be pregnant someday, quitting smoking means a healthier . · Children who are close to you are less likely to become adult smokers. Where can you learn more? Go to http://www.gray.com/  Enter O319 in the search box to learn more about \"Learning About Benefits From Quitting Smoking. \"  Current as of: 2021               Content Version: 13.2   [employer]. Care instructions adapted under license by [employer] (which disclaims liability or warranty for this information). If you have questions about a medical condition or this instruction, always ask your healthcare professional. [place] [address] any warranty or liability for your use of this information. The best way to stay healthy is to live a healthy lifestyle. A healthy lifestyle includes regular exercise, eating a well-balanced diet, keeping a healthy weight and not smoking. Regular physical exams and screening tests are another important way to take care of yourself. Preventive exams provided by health care providers can find health problems early when treatment works best and can keep you from getting certain diseases or illnesses. Preventive services include exams, lab tests, screenings, shots, monitoring and information to help you take care of your own health. All people over 65 should have a pneumonia shot. Pneumonia shots are usually only needed once in a lifetime unless your doctor decides differently.        In addition to your physical exam, some screening tests are recommended:    All people over 65 should have a yearly flu shot.    People over 65 are at medium to high risk for Hepatitis B. Three shots are needed for complete protection. Bone mass measurement (dexa scan) is recommended every two years. Diabetes Mellitus screening is recommended every year. Glaucoma is an eye disease caused by high pressure in the eye. An eye exam is recommended every year. Cardiovascular screening tests that check your cholesterol and other blood fat (lipid) levels are recommended every five years. Colorectal Cancer screening tests help to find pre-cancerous polyps (growths in the colon) so they can be removed before they turn into cancer. Tests ordered for screening depend on your personal and family history risk factors. Prostate Cancer Screening (annually up to age 76)    Screening for breast cancer is recommended yearly with a Mammogram.    Screening for cervical and vaginal cancer is recommended with a pelvic and Pap test every two years. However if you have had an abnormal pap in the past  three years or at high risk for cervical or vaginal cancer Medicare will cover a pap test and a pelvic exam every year. Here is a list of your current Health Maintenance items with a due date:  Health Maintenance Due   Topic Date Due    Hepatitis C Test  Never done    Shingles Vaccine (1 of 2) Never done    DTaP/Tdap/Td  (1 - Tdap) 01/01/2001    Bone Mineral Density   Never done    Cholesterol Test   01/13/2022    Annual Well Visit  01/14/2022          Preventing Falls: Care Instructions  Your Care Instructions     Getting around your home safely can be a challenge if you have injuries or health problems that make it easy for you to fall. Loose rugs and furniture in walkways are among the dangers for many older people who have problems walking or who have poor eyesight. People who have conditions such as arthritis, osteoporosis, or dementia also have to be careful not to fall.   You can make your home safer with a few simple measures. Follow-up care is a key part of your treatment and safety. Be sure to make and go to all appointments, and call your doctor if you are having problems. It's also a good idea to know your test results and keep a list of the medicines you take. How can you care for yourself at home? Taking care of yourself  · Exercise regularly to improve your strength, muscle tone, and balance. Walk if you can. Swimming may be a good choice if you cannot walk easily. · Have your vision and hearing checked each year or any time you notice a change. If you have trouble seeing and hearing, you might not be able to avoid objects and could lose your balance. · Know the side effects of the medicines you take. Ask your doctor or pharmacist whether the medicines you take can affect your balance. Sleeping pills or sedatives can affect your balance. · Limit the amount of alcohol you drink. Alcohol can impair your balance and other senses. · Ask your doctor whether calluses or corns on your feet need to be removed. If you wear loose-fitting shoes because of calluses or corns, you can lose your balance and fall. · Talk to your doctor if you have numbness in your feet. · You may get dizzy if you do not drink enough water. To prevent dehydration, drink plenty of fluids. Choose water and other clear liquids. If you have kidney, heart, or liver disease and have to limit fluids, talk with your doctor before you increase the amount of fluids you drink. Preventing falls at home  · Remove raised doorway thresholds, throw rugs, and clutter. Repair loose carpet or raised areas in the floor. · Move furniture and electrical cords to keep them out of walking paths. · Use nonskid floor wax, and wipe up spills right away, especially on ceramic tile floors. · If you use a walker or cane, put rubber tips on it. If you use crutches, clean the bottoms of them regularly with an abrasive pad, such as steel wool.   · Keep your house well lit, especially stairways, porches, and outside walkways. Use night-lights in areas such as hallways and bathrooms. Add extra light switches or use remote switches (such as switches that go on or off when you clap your hands) to make it easier to turn lights on if you have to get up during the night. · Install sturdy handrails on stairways. · Move items in your cabinets so that the things you use a lot are on the lower shelves (about waist level). · Keep a cordless phone and a flashlight with new batteries by your bed. If possible, put a phone in each of the main rooms of your house, or carry a cell phone in case you fall and cannot reach a phone. Or, you can wear a device around your neck or wrist. You push a button that sends a signal for help. · Wear low-heeled shoes that fit well and give your feet good support. Use footwear with nonskid soles. Check the heels and soles of your shoes for wear. Repair or replace worn heels or soles. · Do not wear socks without shoes on wood floors. · Walk on the grass when the sidewalks are slippery. If you live in an area that gets snow and ice in the winter, sprinkle salt on slippery steps and sidewalks. Or ask a family member or friend to do this for you. Preventing falls in the bath  · Install grab bars and nonskid mats inside and outside your shower or tub and near the toilet and sinks. · Use shower chairs and bath benches. · Use a hand-held shower head that will allow you to sit while showering. · Get into a tub or shower by putting the weaker leg in first. Get out of a tub or shower with your strong side first.  · Repair loose toilet seats and consider installing a raised toilet seat to make getting on and off the toilet easier. · Keep your bathroom door unlocked while you are in the shower. Where can you learn more? Go to http://www.gutierrez.com/  Enter G117 in the search box to learn more about \"Preventing Falls: Care Instructions. \"  Current as of: September 8, 2021               Content Version: 13.2  © 2599-6570 Healthwise, [facility]. Care instructions adapted under license by [employer] (which disclaims liability or warranty for this information). If you have questions about a medical condition or this instruction, always ask your healthcare professional. [place] [address] any warranty or liability for your use of this information.

## 2022-05-25 NOTE — PROGRESS NOTES
Chief Complaint   Patient presents with   Eliza Crandall Annual Wellness Visit     Medicare Wellness     1. \"Have you been to the ER, urgent care clinic since your last visit? Hospitalized since your last visit? \" Yes Can not remember date, went to [address] [place] [address] for UTI.  9/2021 [facility] Septic Shock. 2. \"Have you seen or consulted any other health care providers outside of the [address] [place] [address] since your last visit? \" No     3. For patients aged 39-70: Has the patient had a colonoscopy / FIT/ Cologuard? NA - based on age      If the patient is female:    4. For patients aged 41-77: Has the patient had a mammogram within the past 2 years? NA - based on age or sex      11. For patients aged 21-65: Has the patient had a pap smear? NA - based on age or sex     3 most recent PHQ Screens 5/25/2022   Little interest or pleasure in doing things Not at all   Feeling down, depressed, irritable, or hopeless Several days   Total Score PHQ 2 1     Fall Risk Assessment, last 12 mths 5/25/2022   Able to walk? Yes   Fall in past 12 months? 1   Do you feel unsteady? 1   Are you worried about falling 0   Is TUG test greater than 12 seconds? 0   Is the gait abnormal? 0   Number of falls in past 12 months 2   Fall with injury? 0               [facility] Patient Questionnaire     Question 5/25/2022  8:09 AM EDT - Orysia Ahumada by Ernestina Pemberton CMA    Do you average more than 1 drink per night or more than 7 drinks a week? No    On any one occasion in the past three months have you had more than 3 drinks containing alcohol? No    Choose which best describes your diet: I do not follow a special diet    Choose which best describes your hearing: Other    Please enter additional comments about your hearing: Require high volume on TV. Choose which best describes your vision: Other    Please enter additional comments about your vision: Blind spots in vision, Seeing double images with fatigue. Of the following, what does your home contain?  Handrails Grab bars    Choose which best describes your self-care: I do total self-care    Choose from the following, what best describes your walking ability? With mild difficulty    With difficulty, how far are you able to walk? Uses a cane. What makes walking difficult? Pain    Select the walking aids you use: Cane    Choose which best describes your exercise habits: I am moderately active    Do you ever feel afraid of your partner? No    Are you in a relationship with someone who physically or mentally threatens you? No    Is it safe for you to go home? Yes    Has your family or caregiver ever told you they are concerned about your memory? No    Over the last 2 weeks, how often have you been bothered by the following problems? Had little interest or pleasure in doing things? Not at all    Felt down, depressed, irritable, or hopeless?  Several days    Calculation of PHQ 2 values (range: 0 - 6) 1

## 2022-05-25 NOTE — PROGRESS NOTES
Medicare Wellness Exam:    Chief Complaint   Patient presents with    Annual Wellness Visit     Medicare Wellness     she is a 65y.o. year old female who presents for evaluation for their Medicare Wellness Visit. Patient presents for [facility] fasting labs, and management of neuropathy. Gabapentin works well in am and pm. Reports breakthrough neuropathy in the afternoon and would like to increase medication dose. Also c/o insomnia. Currently taking Melatonin 20mg nightly at 9pm and ZZZquil. Typical bedtime 1130pm. Reports difficulty falling and staying asleep. Follows with cardiology for management of HTN, HLD, CAD, and aortic murmur. Follows with GI for management of microscopic colitis. Currently taking Budesonide for 7 weeks. Fall Screen is completed and assessed=yes  Depression Screen is completed and assessed=yes  Medication list reviewed and adjusted for accuracy=yes  Immunizations reviewed and updated=yes  Health/Preventative Screenings reviewed and updated=yes  ADL Functions reviewed=yes  MiniCog score= 5/5 (2points for clock, 3/3 for recall)  See scanned medicare wellness documents for full details. Patient Active Problem List    Diagnosis    Tobacco dependence due to cigarettes    Chronic colitis    Parainfluenza virus bronchitis    Coronary artery disease involving native coronary artery of native heart    Aortic heart murmur    History of aortic valve replacement with tissue graft    HTN (hypertension), benign    Hypertriglyceridemia    Idiopathic peripheral autonomic neuropathy    Primary osteoarthritis of both knees    Psoriasis       Reviewed PmHx, RxHx, FmHx, SocHx, AllgHx and updated and dated in the chart. Review of Systems   Constitutional: Negative for chills, fever and weight loss. HENT: Positive for hearing loss. Negative for congestion, ear pain, sinus pain and sore throat. Denies difficulty swallowing. Eyes: Positive for blurred vision. Respiratory: Negative for cough, sputum production, shortness of breath and wheezing. Cardiovascular: Positive for leg swelling (follows with cardiology and vascular, no identifable cause found). Negative for chest pain and palpitations. Gastrointestinal: Positive for diarrhea (chronic). Negative for abdominal pain, constipation and heartburn. Genitourinary: Negative for dysuria. Musculoskeletal: Positive for joint pain. Negative for myalgias. Neurological: Positive for tingling (neuropathy). Negative for dizziness, weakness and headaches. Psychiatric/Behavioral: Negative for depression. The patient is not nervous/anxious. Objective:     Vitals:    05/25/22 0810   BP: 130/76   Pulse: (!) 56   Temp: 97.5 °F (36.4 °C)   TempSrc: Temporal   SpO2: 97%   Weight: 176 lb 6.4 oz (80 kg)   Height: 5' 2\" (1.575 m)     Physical Exam  Vitals and nursing note reviewed. Constitutional:       General: She is not in acute distress. Appearance: Normal appearance. She is obese. HENT:      Head: Normocephalic. Eyes:      Extraocular Movements: Extraocular movements intact. Neck:      Thyroid: No thyroid mass, thyromegaly or thyroid tenderness. Cardiovascular:      Rate and Rhythm: Normal rate and regular rhythm. Heart sounds: Normal heart sounds. Pulmonary:      Effort: Pulmonary effort is normal.      Breath sounds: Normal breath sounds. Chest:   Breasts:      Right: No supraclavicular adenopathy. Left: No supraclavicular adenopathy. Musculoskeletal:         General: Normal range of motion. Cervical back: Neck supple. Right lower leg: No edema. Left lower leg: No edema. Comments: Ambulates with cane   Lymphadenopathy:      Cervical: No cervical adenopathy. Upper Body:      Right upper body: No supraclavicular adenopathy. Left upper body: No supraclavicular adenopathy. Skin:     General: Skin is warm and dry.    Neurological:      Mental Status: She is alert and oriented to person, place, and time. Psychiatric:         Mood and Affect: Mood normal.         Behavior: Behavior normal.          Assessment/ Plan:   Diagnoses and all orders for this visit:    1. Medicare annual wellness visit, subsequent  [facility] exam completed as documented. 2. Depression screening  Negative depression screening. 3. Alcohol screening  Low risk for alcohol misuse. 4. At moderate risk for fall  Reports 2 falls over the last year. Both occurred shortly after hospitalization. Encouraged to wear supportive shoes at all times and use assistive devices as needed. Handout provided. 5. Class 1 obesity due to excess calories with serious comorbidity and body mass index (BMI) of 32.0 to 32.9 in adult  Stay active, limit portions, and eat a healthy diet. 6. Encounter for immunization  Advised to receive Shingrix and Tenivac vaccine from pharmacy and cautioned there may be an out-of-pocket expense. Advised to receive second COVID booster from pharmacy. Separate Shingrix and COVID vaccines by at least 2 weeks. 7. Osteoporosis screening declined    8. Encounter for hepatitis C screening test for low risk patient  -     HEPATITIS C AB, RFLX TO QT BY PCR    9. Tobacco dependence due to cigarettes  The patient was counseled on the dangers of tobacco use, and was advised to quit and reluctant to quit. Reviewed strategies to maximize success, including Call if we can be of assistance in the future. 10. Idiopathic peripheral autonomic neuropathy  Currently not well controlled. Instructed to take Gabapentin 300mg three times daily. If bedtime symptoms are not well controlled, will consider increasing bedtime dose to 600mg nightly. 11. Primary insomnia  We discussed normal age-related sleep changes. Encouraged to take melatonin 5 to 10 mg nightly at 8 PM.  Avoid use of ZZZquil on a regular basis. 12. HTN (hypertension), benign  Follows with cardiology for management. BP is below goal in the office today. -     METABOLIC PANEL, COMPREHENSIVE    13. Hypertriglyceridemia  Follows with cardiology for management. Checking annual labs today. -     CBC WITH AUTOMATED DIFF  -     LIPID PANEL  -     TSH RFX ON ABNORMAL TO FREE T4         -Pain evaluation performed in office  -Cognitive Screen performed in office  -Depression Screen, Fall risks (by up and go test)  and ADL functionality were addressed  -Medication list updated and reviewed for any changes   -A comprehensive review of medical issues and a plan was formulated  -End of life planning was addressed with pt   -Health Screenings for preventions were addressed and a plan was formulated  -Shingles Vaccine was recommended  -Discussed with patient cancer risk factors and appropriate screenings for age  -Patient evaluated for colonoscopy and referred if needed per screeing criteria  -Labs from previous visits were discussed with patient   -Discussed with patient diet and exercise and formulated a plan as needed  -An Advanced care plan was developed with the patient.  -Alcohol screening performed and was negative    -  Follow-up and Dispositions    · Return in about 6 months (around 11/25/2022) for follow up neuropathy. I have discussed the diagnosis with the patient and the intended plan as seen in the above orders. The patient understands and agrees with the plan. The patient has received an after-visit summary and questions were answered concerning future plans.      Medication Side Effects and Warnings were discussed with patient  Patient Labs were reviewed and or requested  Patient Past Records were reviewed and or requested        Collette WALKER

## 2022-05-26 LAB
ALBUMIN SERPL-MCNC: 3.8 G/DL (ref 3.7–4.7)
ALBUMIN/GLOB SERPL: 1.2 {RATIO} (ref 1.2–2.2)
ALP SERPL-CCNC: 51 IU/L (ref 44–121)
ALT SERPL-CCNC: 11 IU/L (ref 0–32)
AST SERPL-CCNC: 17 IU/L (ref 0–40)
BASOPHILS # BLD AUTO: 0 X10E3/UL (ref 0–0.2)
BASOPHILS NFR BLD AUTO: 0 %
BILIRUB SERPL-MCNC: 0.3 MG/DL (ref 0–1.2)
BUN SERPL-MCNC: 15 MG/DL (ref 8–27)
BUN/CREAT SERPL: 21 (ref 12–28)
CALCIUM SERPL-MCNC: 8.9 MG/DL (ref 8.7–10.3)
CHLORIDE SERPL-SCNC: 92 MMOL/L (ref 96–106)
CHOLEST SERPL-MCNC: 181 MG/DL (ref 100–199)
CO2 SERPL-SCNC: 26 MMOL/L (ref 20–29)
CREAT SERPL-MCNC: 0.73 MG/DL (ref 0.57–1)
EGFR: 84 ML/MIN/1.73
EOSINOPHIL # BLD AUTO: 0.2 X10E3/UL (ref 0–0.4)
EOSINOPHIL NFR BLD AUTO: 2 %
ERYTHROCYTE [DISTWIDTH] IN BLOOD BY AUTOMATED COUNT: 13.1 % (ref 11.7–15.4)
GLOBULIN SER CALC-MCNC: 3.2 G/DL (ref 1.5–4.5)
GLUCOSE SERPL-MCNC: 85 MG/DL (ref 65–99)
HCT VFR BLD AUTO: 41.2 % (ref 34–46.6)
HCV AB S/CO SERPL IA: <0.1 S/CO RATIO (ref 0–0.9)
HCV AB SERPL QL IA: NORMAL
HDLC SERPL-MCNC: 39 MG/DL
HGB BLD-MCNC: 13.4 G/DL (ref 11.1–15.9)
IMM GRANULOCYTES # BLD AUTO: 0 X10E3/UL (ref 0–0.1)
IMM GRANULOCYTES NFR BLD AUTO: 0 %
LDLC SERPL CALC-MCNC: 102 MG/DL (ref 0–99)
LYMPHOCYTES # BLD AUTO: 3.2 X10E3/UL (ref 0.7–3.1)
LYMPHOCYTES NFR BLD AUTO: 30 %
MCH RBC QN AUTO: 30.3 PG (ref 26.6–33)
MCHC RBC AUTO-ENTMCNC: 32.5 G/DL (ref 31.5–35.7)
MCV RBC AUTO: 93 FL (ref 79–97)
MONOCYTES # BLD AUTO: 0.9 X10E3/UL (ref 0.1–0.9)
MONOCYTES NFR BLD AUTO: 9 %
NEUTROPHILS # BLD AUTO: 6.1 X10E3/UL (ref 1.4–7)
NEUTROPHILS NFR BLD AUTO: 59 %
PLATELET # BLD AUTO: 209 X10E3/UL (ref 150–450)
POTASSIUM SERPL-SCNC: 3.9 MMOL/L (ref 3.5–5.2)
PROT SERPL-MCNC: 7 G/DL (ref 6–8.5)
RBC # BLD AUTO: 4.42 X10E6/UL (ref 3.77–5.28)
SODIUM SERPL-SCNC: 138 MMOL/L (ref 134–144)
TRIGL SERPL-MCNC: 230 MG/DL (ref 0–149)
TSH SERPL DL<=0.005 MIU/L-ACNC: 2.94 UIU/ML (ref 0.45–4.5)
VLDLC SERPL CALC-MCNC: 40 MG/DL (ref 5–40)
WBC # BLD AUTO: 10.5 X10E3/UL (ref 3.4–10.8)

## 2022-05-26 NOTE — PROGRESS NOTES
No blood cell abnormalities including anemia. Total cholesterol is normal with slight elevation of LDL, or bad cholesterol. Recommend low-fat/cholesterol intake. Triglycerides remain elevated with normal glucose and thyroid function. If she drinks excessive alcohol I would recommend cutting back. Normal glucose, kidney and liver function. Your one time hepatitis C screening is negative.

## 2022-06-23 DIAGNOSIS — G90.09 IDIOPATHIC PERIPHERAL AUTONOMIC NEUROPATHY: ICD-10-CM

## 2022-06-23 RX ORDER — GABAPENTIN 300 MG/1
CAPSULE ORAL
Qty: 90 CAPSULE | Refills: 4 | Status: SHIPPED | OUTPATIENT
Start: 2022-06-23

## 2022-11-17 ENCOUNTER — OFFICE VISIT (OUTPATIENT)
Dept: FAMILY MEDICINE CLINIC | Age: 77
End: 2022-11-17
Payer: MEDICARE

## 2022-11-17 VITALS
RESPIRATION RATE: 16 BRPM | SYSTOLIC BLOOD PRESSURE: 145 MMHG | HEIGHT: 62 IN | DIASTOLIC BLOOD PRESSURE: 94 MMHG | TEMPERATURE: 97.6 F | WEIGHT: 191 LBS | BODY MASS INDEX: 35.15 KG/M2 | OXYGEN SATURATION: 98 %

## 2022-11-17 DIAGNOSIS — G89.29 CHRONIC PAIN OF RIGHT KNEE: ICD-10-CM

## 2022-11-17 DIAGNOSIS — M25.561 CHRONIC PAIN OF RIGHT KNEE: ICD-10-CM

## 2022-11-17 DIAGNOSIS — M17.11 ARTHRITIS OF RIGHT KNEE: Primary | ICD-10-CM

## 2022-11-17 PROCEDURE — 20610 DRAIN/INJ JOINT/BURSA W/O US: CPT | Performed by: STUDENT IN AN ORGANIZED HEALTH CARE EDUCATION/TRAINING PROGRAM

## 2022-11-17 RX ORDER — LIDOCAINE HYDROCHLORIDE 10 MG/ML
4 INJECTION INFILTRATION; PERINEURAL ONCE
Status: COMPLETED | OUTPATIENT
Start: 2022-11-17 | End: 2022-11-17

## 2022-11-17 RX ORDER — POTASSIUM CHLORIDE 1500 MG/1
TABLET, FILM COATED, EXTENDED RELEASE ORAL
COMMUNITY
Start: 2022-10-24

## 2022-11-17 RX ORDER — AMLODIPINE BESYLATE 5 MG/1
5 TABLET ORAL DAILY
COMMUNITY
Start: 2022-09-19

## 2022-11-17 RX ORDER — TRIAMCINOLONE ACETONIDE 40 MG/ML
40 INJECTION, SUSPENSION INTRA-ARTICULAR; INTRAMUSCULAR ONCE
Status: COMPLETED | OUTPATIENT
Start: 2022-11-17 | End: 2022-11-17

## 2022-11-17 RX ORDER — CLOPIDOGREL BISULFATE 75 MG/1
TABLET ORAL
COMMUNITY
Start: 2022-10-05

## 2022-11-17 RX ADMIN — TRIAMCINOLONE ACETONIDE 40 MG: 40 INJECTION, SUSPENSION INTRA-ARTICULAR; INTRAMUSCULAR at 09:53

## 2022-11-17 RX ADMIN — LIDOCAINE HYDROCHLORIDE 4 ML: 10 INJECTION INFILTRATION; PERINEURAL at 09:53

## 2022-11-17 NOTE — PROGRESS NOTES
Chief Complaint   Patient presents with    Knee Pain     Right knee pain     Visit Vitals  BP (!) 145/94 (BP 1 Location: Left upper arm, BP Patient Position: Sitting)   Temp 97.6 °F (36.4 °C) (Axillary)   Resp 16   Ht 5' 2\" (1.575 m)   Wt 191 lb (86.6 kg)   SpO2 98%   BMI 34.93 kg/m²     1. \"Have you been to the ER, urgent care clinic since your last visit? Hospitalized since your last visit? \" Yes When: 3 weeks ago went to [address] [place] [address] for a TIA    2. \"Have you seen or consulted any other health care providers outside of the [address] [place] [address] since your last visit? \" Yes When: 3 weeks ago went to [address] [place] [address] for TIA      3. For patients aged 39-70: Has the patient had a colonoscopy / FIT/ Cologuard? NA - based on age      If the patient is female:    4. For patients aged 41-77: Has the patient had a mammogram within the past 2 years? NA - based on age or sex      11. For patients aged 21-65: Has the patient had a pap smear?  NA - based on age or sex

## 2022-11-17 NOTE — PROGRESS NOTES
Marcia Burrell (: 1945) is a 65 y.o. female, established patient, here for evaluation of the following chief complaint(s):  Knee Pain (Right knee pain)       ASSESSMENT/PLAN:  Below is the assessment and plan developed based on review of pertinent history, physical exam, labs, studies, and medications. 1. Chronic pain of right knee  2. Arthritis of right knee  -     diclofenac EC (VOLTAREN) 75 mg EC tablet; Take 1 Tablet by mouth two (2) times a day., Normal, Disp-60 Tablet, R-1  -     AK DRAIN/INJECT LARGE JOINT/BURSA  -     triamcinolone acetonide (KENALOG-40) 40 mg/mL injection 40 mg; 40 mg, Intra artICUlar, ONCE, 1 dose, On u 22 at 1900  -     lidocaine (XYLOCAINE) 10 mg/mL (1 %) injection 4 mL; 4 mL, IntraMUSCular, ONCE, 1 dose, On u 22 at 1900    Indications:   Right knee arthritis    Procedure:  After consent was obtained, using sterile technique the right knee joint was prepped using Betadine and alcohol. Local anesthetic used: Cryospray. Kenalog 40 mg was mixed with 1% lidocaine for ml  and injected into the joint (anterolateral approach)and the needle withdrawn. The procedure was well tolerated. The patient is asked to continue to rest the joint for a few more days before resuming regular activities. It may be more painful for the first 1-2 days. Watch for fever, or increased swelling or persistent pain in the joint. Call or return to clinic prn if such symptoms occur or there is failure to improve as anticipated. SUBJECTIVE/OBJECTIVE:    69-year-old female presents for right knee injection. She has history of bilateral knee pain and bilateral arthritis. Pain is worse in the right knee. Does have history of titanium vasiliy in her femur and right hip. Last knee injection was done about 9 months ago. No history of knee replacement. Recently had TIA found to [place].  Was placed on Plavix.     Review of Systems   Constitutional:  Negative for activity change. Musculoskeletal:         Rt knee pain     Physical Exam  Vitals reviewed. Constitutional:       Appearance: Normal appearance. Comments: With walker   Musculoskeletal:      Right knee: Tenderness present over the medial joint line. Neurological:      Mental Status: She is alert. An electronic signature was used to authenticate this note.   -- Sabra Lauren MD

## 2022-12-30 DIAGNOSIS — G90.09 IDIOPATHIC PERIPHERAL AUTONOMIC NEUROPATHY: ICD-10-CM

## 2023-01-02 RX ORDER — GABAPENTIN 300 MG/1
CAPSULE ORAL
Qty: 90 CAPSULE | Refills: 0 | Status: SHIPPED | OUTPATIENT
Start: 2023-01-02

## 2023-01-26 ENCOUNTER — OFFICE VISIT (OUTPATIENT)
Dept: FAMILY MEDICINE CLINIC | Age: 78
End: 2023-01-26
Payer: MEDICARE

## 2023-01-26 VITALS
WEIGHT: 194.38 LBS | TEMPERATURE: 97.7 F | OXYGEN SATURATION: 98 % | DIASTOLIC BLOOD PRESSURE: 80 MMHG | SYSTOLIC BLOOD PRESSURE: 138 MMHG | HEIGHT: 62 IN | RESPIRATION RATE: 16 BRPM | BODY MASS INDEX: 35.77 KG/M2 | HEART RATE: 78 BPM

## 2023-01-26 DIAGNOSIS — Z23 ENCOUNTER FOR IMMUNIZATION: ICD-10-CM

## 2023-01-26 DIAGNOSIS — G25.81 RESTLESS LEGS SYNDROME: ICD-10-CM

## 2023-01-26 DIAGNOSIS — Z91.81 AT HIGH RISK FOR FALLS: ICD-10-CM

## 2023-01-26 DIAGNOSIS — Z91.89 AT RISK FOR POLYPHARMACY: ICD-10-CM

## 2023-01-26 DIAGNOSIS — I10 HTN (HYPERTENSION), BENIGN: ICD-10-CM

## 2023-01-26 DIAGNOSIS — B37.2 INTERTRIGINOUS CANDIDIASIS: ICD-10-CM

## 2023-01-26 DIAGNOSIS — G90.09 IDIOPATHIC PERIPHERAL AUTONOMIC NEUROPATHY: Primary | ICD-10-CM

## 2023-01-26 DIAGNOSIS — F51.01 PRIMARY INSOMNIA: ICD-10-CM

## 2023-01-26 PROCEDURE — G8432 DEP SCR NOT DOC, RNG: HCPCS | Performed by: NURSE PRACTITIONER

## 2023-01-26 PROCEDURE — 1101F PT FALLS ASSESS-DOCD LE1/YR: CPT | Performed by: NURSE PRACTITIONER

## 2023-01-26 PROCEDURE — 1123F ACP DISCUSS/DSCN MKR DOCD: CPT | Performed by: NURSE PRACTITIONER

## 2023-01-26 PROCEDURE — 99214 OFFICE O/P EST MOD 30 MIN: CPT | Performed by: NURSE PRACTITIONER

## 2023-01-26 PROCEDURE — G8417 CALC BMI ABV UP PARAM F/U: HCPCS | Performed by: NURSE PRACTITIONER

## 2023-01-26 PROCEDURE — G8400 PT W/DXA NO RESULTS DOC: HCPCS | Performed by: NURSE PRACTITIONER

## 2023-01-26 PROCEDURE — 1090F PRES/ABSN URINE INCON ASSESS: CPT | Performed by: NURSE PRACTITIONER

## 2023-01-26 PROCEDURE — G8427 DOCREV CUR MEDS BY ELIG CLIN: HCPCS | Performed by: NURSE PRACTITIONER

## 2023-01-26 PROCEDURE — 3075F SYST BP GE 130 - 139MM HG: CPT | Performed by: NURSE PRACTITIONER

## 2023-01-26 PROCEDURE — 3079F DIAST BP 80-89 MM HG: CPT | Performed by: NURSE PRACTITIONER

## 2023-01-26 PROCEDURE — G8536 NO DOC ELDER MAL SCRN: HCPCS | Performed by: NURSE PRACTITIONER

## 2023-01-26 RX ORDER — ROPINIROLE 0.25 MG/1
TABLET, FILM COATED ORAL
Qty: 90 TABLET | Refills: 0 | Status: SHIPPED | OUTPATIENT
Start: 2023-01-26 | End: 2023-01-27

## 2023-01-26 RX ORDER — NYSTATIN 100000 U/G
CREAM TOPICAL
Qty: 30 G | Refills: 5 | Status: SHIPPED | OUTPATIENT
Start: 2023-01-26

## 2023-01-26 RX ORDER — NYSTATIN 100000 [USP'U]/G
POWDER TOPICAL 4 TIMES DAILY
Qty: 60 G | Refills: 5 | Status: SHIPPED | OUTPATIENT
Start: 2023-01-26

## 2023-01-26 RX ORDER — ATORVASTATIN CALCIUM 80 MG/1
80 TABLET, FILM COATED ORAL DAILY
COMMUNITY

## 2023-01-26 NOTE — PROGRESS NOTES
Subjective  Chief Complaint   Patient presents with    Follow-up     Neuropathy, sleeping issues     HPI:  Alex Clark is a 77 y.o. female. Patient presents for management of neuropathy and to discuss ongoing sleep concerns. Continues to take Gabapentin 1 in am and 2 in pm. Morning neuropathy is described as mild that \"quiets\" with Gabapentin but returns in the early afternoon. She does not take 2 tablets until evening. Reports ongoing insomnia. C/o \"vibrating\" legs at night. Previously taking medication for RLS that was stopped by previous PCP and switched to Gabapentin for neuropathy. Stopped ZZZquil nightly. Continues to take Melatonin nightly 30 minutes before bed. Edis Manifold which did not help. Able to fall asleep without difficulty between  at night and wakes at 530 every morning. Reports three falls- tripped on ledge, fell out of bed, and does not recall the third fall. EMT was called. History of TIA 9/2022. Concerned she is taking too many meds. Reported home BP readings 130-140/80s. Requesting refill of Nystatin cream and powder.      Past Medical History:   Diagnosis Date    Aortic stenosis     Hypercholesterolemia     Neuropathy     Idiopathic peripheral    Skin problem     TIA (transient ischemic attack) 09/2022     Family History   Problem Relation Age of Onset    Thyroid Disease Daughter     Kidney Disease Son         kidney stone    Stroke Mother     Heart Attack Father     Heart Attack Brother     Stroke Maternal Grandmother      Social History     Socioeconomic History    Marital status:      Spouse name: Not on file    Number of children: Not on file    Years of education: Not on file    Highest education level: Not on file   Occupational History    Not on file   Tobacco Use    Smoking status: Every Day     Packs/day: 0.25     Years: 50.00     Pack years: 12.50     Types: Cigarettes    Smokeless tobacco: Never    Tobacco comments:     4-5 cigs a day currently, never smoked more than 1/2 PPD   Vaping Use    Vaping Use: Never used   Substance and Sexual Activity    Alcohol use: Never    Drug use: Never    Sexual activity: Not on file   Other Topics Concern    Not on file   Social History Narrative    Not on file     Social Determinants of Health     Financial Resource Strain: Low Risk     Difficulty of Paying Living Expenses: Not hard at all   Food Insecurity: No Food Insecurity    Worried About Running Out of Food in the Last Year: Never true    Ran Out of Food in the Last Year: Never true   Transportation Needs: Not on file   Physical Activity: Not on file   Stress: Not on file   Social Connections: Not on file   Intimate Partner Violence: Not on file   Housing Stability: Not on file     Current Outpatient Medications on File Prior to Visit   Medication Sig Dispense Refill    atorvastatin (LIPITOR) 80 mg tablet Take 80 mg by mouth daily. gabapentin (NEURONTIN) 300 mg capsule TAKE ONE CAPSULE BY MOUTH EVERY MORNING AND 2 CAPSULES EVERY EVENING 90 Capsule 0    amLODIPine (NORVASC) 5 mg tablet Take 5 mg by mouth daily. clopidogreL (PLAVIX) 75 mg tab       potassium chloride SR (K-TAB) 20 mEq tablet       budesonide (ENTOCORT EC) 3 mg capsule budesonide DR - ER 3 mg capsule,delayed,extended release   TAKE 3 CAPSULES BY MOUTH EVERY DAY FOR 1 MONTH THEN TAKE 2 CAPSULES BY MOUTH EVERY DAY FOR 1 MONTH THEN TAKE 1 CAPSULE BY MOUTH EVERY DAY FO      CRAN-VITC-MANNOSE-FOS-BROMELN PO Take 2 Capsules by mouth nightly. furosemide (LASIX) 40 mg tablet Take 40 mg by mouth daily. Zenpep 40,000-126,000- 168,000 unit cpDR capsule TAKE ONE CAPSULE BY MOUTH THREE TIMES DAILY WITH MEALS AND ONE WITH SNACKS DAILY      Saccharomyces boulardii (FLORASTOR) 250 mg capsule Take 250 mg by mouth two (2) times a day.       colestipoL (COLESTID) 1 gram tablet TAKE 1 TO 2 TABLETS BY MOUTH AS NEEDED FOR DIARRHEA      carvediloL (COREG) 25 mg tablet Take 12.5 mg by mouth two (2) times daily (with meals). multivitamin (ONE A DAY) tablet Take 1 Tab by mouth daily. pyridoxine, vitamin B6, (VITAMIN B-6) 100 mg tablet Take 100 mg by mouth daily. [DISCONTINUED] nystatin (MYCOSTATIN) topical cream nystatin 100,000 unit/gram topical cream   APPLY TO THE AFFECTED AREA(S) BY TOPICAL ROUTE 2 TIMES PER DAY      [DISCONTINUED] atorvastatin (LIPITOR) 40 mg tablet Take 40 mg by mouth every evening. No current facility-administered medications on file prior to visit. Allergies   Allergen Reactions    Dexlansoprazole Other (comments)     Other reaction(s): SEVERE DIARRHEA    Morphine Unknown (comments)     Other reaction(s): Confusion, PERSONALITY CHANGE,NAUSEA    Clonidine Other (comments)    Keflex [Cephalexin] Diarrhea    Lipitor [Atorvastatin] Myalgia    Niaspan [Niacin] Other (comments)     flushing    Rosuvastatin Myalgia    Tricor [Fenofibrate Micronized] Myalgia    Sulfa (Sulfonamide Antibiotics) Swelling     Facial swelling  Other reaction(s): RASH patients states she can take sulfa drugs         Objective  Visit Vitals  /80   Pulse 78   Temp 97.7 °F (36.5 °C) (Temporal)   Resp 16   Ht 5' 2\" (1.575 m)   Wt 194 lb 6 oz (88.2 kg)   SpO2 98%   BMI 35.55 kg/m²       Physical Exam  Vitals and nursing note reviewed. Constitutional:       General: She is not in acute distress. Appearance: Normal appearance. HENT:      Head: Normocephalic. Eyes:      Extraocular Movements: Extraocular movements intact. Cardiovascular:      Rate and Rhythm: Normal rate and regular rhythm. Heart sounds: Murmur heard. Pulmonary:      Effort: Pulmonary effort is normal.      Breath sounds: Normal breath sounds. Musculoskeletal:         General: Normal range of motion. Right lower leg: No edema. Left lower leg: No edema. Comments: Ambulates with cane   Skin:     General: Skin is warm and dry.    Neurological:      Mental Status: She is alert and oriented to person, place, and time. Psychiatric:         Mood and Affect: Mood normal.         Behavior: Behavior normal.        Assessment & Plan      ICD-10-CM ICD-9-CM    1. Idiopathic peripheral autonomic neuropathy  G90.09 337.00       2. Restless legs syndrome  G25.81 333.94 rOPINIRole (REQUIP) 0.25 mg tablet      3. Primary insomnia  F51.01 307.42       4. At high risk for falls  Z91.81 V15.88       5. HTN (hypertension), benign  I10 401.1       6. At risk for polypharmacy  Z91.89 V49.89       7. Intertriginous candidiasis  B37.2 112.3 nystatin (MYCOSTATIN) topical cream      nystatin (MYCOSTATIN) powder      8. Encounter for immunization  Z23 V03.89         Diagnoses and all orders for this visit:    1. Idiopathic peripheral autonomic neuropathy  Symptoms are now mild and tolerable. Gabapentin is a high risk medication and she has had recent falls. Her main complaint is \"vibrating\" legs at night with a history of RLS. She is agreeable to wean off Gabapentin for neuropathy and treat RLS with medication which has worked well for her in the past.  Written wean instructions provided. 2. Restless legs syndrome  Medication as ordered which is worked well for her in the past. Written taper instructions provided. Advised to call if restless legs are not well controlled with 1 mg nightly. -     rOPINIRole (REQUIP) 0.25 mg tablet; 0.25 mg once daily 1 to 3 hours before bedtime. Dose may be increased after 2 days to 0.5 mg once daily, and after 7 days to 1 mg once daily. 3. Primary insomnia  We readdressed normal age-related sleep changes. Reassured early morning awakenings are common. 6 hours of sleep per night is decent sleep for her age. Encouraged to nap for 20-30 minutes daily. Medication increases her risk for fall and injury. Encouraged to take melatonin between 8 and 9 PM versus 30 minutes before bed. Medication as ordered for RLS which may help. 4. At high risk for falls  Three recent falls. Ambulates with cane. Weaning off Gabapentin. Avoid sedative medication and alcohol. 5. HTN (hypertension), benign  BP is below goal in the office today and on reported home readings. Hypotension not likely contributing to falls. 6. At risk for polypharmacy  Encouraged to discuss her current concerns about taking too many medications with her specialists. We discussed the risks and benefits of discontinuing medications especially cardiac meds. 7. Intertriginous candidiasis  Medication refilled as requested. Do not use lotion and powder in combination which can increase risk for candidiasis. -     nystatin (MYCOSTATIN) topical cream; APPLY TO THE AFFECTED AREA(S) BY TOPICAL ROUTE 2 TIMES PER DAY  -     nystatin (MYCOSTATIN) powder; Apply  to affected area four (4) times daily. 8. Encounter for immunization  Receive COVID booster, Tenivac and Shingrix vaccines from pharmacy. Separate COVID and Shingrix vaccine by two weeks. Ask about any copays prior to receiving vaccine     Aspects of this note may have been generated using voice recognition software. Despite editing, there may be some syntax errors. Follow-up and Dispositions    Return in about 5 months (around 6/26/2023) for MCW, fasting labs, follow up, chronic conditions/meds. I have discussed the diagnosis with the patient and the intended plan as seen in the above orders. The patient has received an after-visit summary and questions were answered concerning future plans. I have discussed medication side effects and warnings with the patient as well.     Vanna Way NP

## 2023-01-26 NOTE — PROGRESS NOTES
Chief Complaint   Patient presents with    Follow-up     Neuropathy, sleeping issues    1. \"Have you been to the ER, urgent care clinic since your last visit? Hospitalized since your last visit? \" No    2. \"Have you seen or consulted any other health care providers outside of the [address] [place] [address] since your last visit? \" No     3. For patients aged 39-70: Has the patient had a colonoscopy / FIT/ Cologuard? NA - based on age      If the patient is female:    4. For patients aged 41-77: Has the patient had a mammogram within the past 2 years? NA - based on age or sex      11. For patients aged 21-65: Has the patient had a pap smear?  NA - based on age or sex Visit Vitals  /80   Pulse 78   Temp 97.7 °F (36.5 °C) (Temporal)   Resp 16   Ht 5' 2\" (1.575 m)   Wt 194 lb 6 oz (88.2 kg)   SpO2 98%   BMI 35.55 kg/m²      3 most recent PHQ Screens 1/26/2023   Little interest or pleasure in doing things Not at all   Feeling down, depressed, irritable, or hopeless Not at all   Total Score PHQ 2 0

## 2023-01-26 NOTE — PATIENT INSTRUCTIONS
Wean off Gabapentin-  Take 1 tablet twice daily for the next week, then 1 tablet daily for one week, then 1 tablet every other day for one week, then stop Gabapentin. Taper of Ropinirole-  Take 1 tablet nightly 1 to 3 hours before bedtime for 2-5 days, then increase to 2 tablets night for one week, then increase to 3 tablets nightly for one week, then increase to 4 tablets nightly. **Stop increasing the medication if restless legs resolve** Continue the lowest dose possible nightly. Receive third COVID booster, Tenivac and Shingrix vaccines from pharmacy. Separate COVID and Shingrix vaccine by two weeks.  Ask about any copays prior to receiving vaccine

## 2023-01-27 RX ORDER — ROPINIROLE 0.25 MG/1
TABLET, FILM COATED ORAL
Qty: 385 TABLET | Refills: 0 | Status: SHIPPED | OUTPATIENT
Start: 2023-01-27

## 2023-02-14 DIAGNOSIS — G90.09 IDIOPATHIC PERIPHERAL AUTONOMIC NEUROPATHY: ICD-10-CM

## 2023-02-15 RX ORDER — GABAPENTIN 300 MG/1
CAPSULE ORAL
Qty: 90 CAPSULE | Refills: 0 | Status: SHIPPED | OUTPATIENT
Start: 2023-02-15

## 2023-05-30 DIAGNOSIS — G90.09 IDIOPATHIC PERIPHERAL AUTONOMIC NEUROPATHY: Primary | ICD-10-CM

## 2023-05-30 NOTE — TELEPHONE ENCOUNTER
----- Message from Abdullahi Brown sent at 5/30/2023  3:22 PM EDT -----  Subject: Refill Request    QUESTIONS  Name of Medication? gabapentin (NEURONTIN) 300 MG capsule  Patient-reported dosage and instructions? once a day   How many days do you have left? 2  [id number]  Pharmacy phone number (if available)? [phone]  ---------------------------------------------------------------------------  --------------  Lana TREVINO  What is the best way for the office to contact you? OK to leave message on   voicemail  Preferred Call Back Phone Number? [phone]  ---------------------------------------------------------------------------  --------------  SCRIPT ANSWERS  Relationship to Patient?  Self

## 2023-05-31 RX ORDER — GABAPENTIN 300 MG/1
CAPSULE ORAL
Qty: 90 CAPSULE | Refills: 0 | Status: SHIPPED | OUTPATIENT
Start: 2023-05-31 | End: 2023-06-30

## 2023-06-22 ENCOUNTER — OFFICE VISIT (OUTPATIENT)
Facility: CLINIC | Age: 78
End: 2023-06-22

## 2023-06-22 VITALS
HEIGHT: 62 IN | BODY MASS INDEX: 35.33 KG/M2 | WEIGHT: 192 LBS | SYSTOLIC BLOOD PRESSURE: 140 MMHG | HEART RATE: 89 BPM | DIASTOLIC BLOOD PRESSURE: 84 MMHG | OXYGEN SATURATION: 100 % | TEMPERATURE: 97.3 F

## 2023-06-22 DIAGNOSIS — M25.561 CHRONIC PAIN OF RIGHT KNEE: Primary | ICD-10-CM

## 2023-06-22 DIAGNOSIS — G89.29 CHRONIC PAIN OF RIGHT KNEE: Primary | ICD-10-CM

## 2023-06-22 DIAGNOSIS — M17.11 PRIMARY OSTEOARTHRITIS OF RIGHT KNEE: ICD-10-CM

## 2023-06-22 RX ORDER — POTASSIUM CHLORIDE 1500 MG/1
1 TABLET, EXTENDED RELEASE ORAL 2 TIMES DAILY
COMMUNITY
Start: 2023-04-29 | End: 2023-06-25

## 2023-06-22 NOTE — PROGRESS NOTES
Chief Complaint   Patient presents with    Injections     R knee cortisone injection     1. Have you been to the ER, urgent care clinic since your last visit? Hospitalized since your last visit? Yes 2-3 months ago for TIA went to [address] [place] [address]. [facility] comes when she needs them. 2. Have you seen or consulted any other health care providers outside of the [address] [place] [address] since your last visit? No     3. For patients aged 39-70: Has the patient had a colonoscopy / FIT/ Cologuard? NA - based on age/sex    If the patient is female:    4. For patients aged 41-77: Has the patient had a mammogram within the past 2 years? NA - based on age/sex      5. For patients aged 21-65: Has the patient had a pap smear?   NA - based on age/sex
Symptom relief from osteoarthritis    Procedure:  After consent was obtained, using sterile technique the right knee joint was prepped using Betadine and. Local anesthetic used:  Cold spray . Kenalog 40 mg was mixed with 1% lidocaine 3 ml  and injected into the joint and the needle withdrawn. The procedure was well tolerated. The patient is asked to continue to rest the joint for a few more days before resuming regular activities. It may be more painful for the first 1-2 days. Watch for fever, or increased swelling or persistent pain in the joint. Call or return to clinic prn if such symptoms occur or there is failure to improve as anticipated.

## 2023-06-25 RX ORDER — LIDOCAINE HCL/PF 50 MG/5 ML
3 SYRINGE (ML) INJECTION ONCE
Status: SHIPPED | OUTPATIENT
Start: 2023-06-25

## 2023-07-17 DIAGNOSIS — G90.09 IDIOPATHIC PERIPHERAL AUTONOMIC NEUROPATHY: ICD-10-CM

## 2023-07-19 ENCOUNTER — TELEPHONE (OUTPATIENT)
Facility: CLINIC | Age: 78
End: 2023-07-19

## 2023-07-19 NOTE — TELEPHONE ENCOUNTER
----- Message from [facility] sent at 7/19/2023 10:41 AM EDT -----  Subject: Message to Provider    QUESTIONS  Information for Provider? Pt is calling in regards to a prescription   refill of gabapentin (NEURONTIN) 300 MG that was called in on 7/17/2023 pt   is out of her medication and would like it if someone from the practice   could give her an update on when she could expect her prescription. Pt can   be reached at [phone].  ---------------------------------------------------------------------------  --------------  Marcial Joshua INFO  [phone]; OK to leave message on voicemail  ---------------------------------------------------------------------------  --------------  SCRIPT ANSWERS  Relationship to Patient?  Self

## 2023-07-21 ENCOUNTER — TELEPHONE (OUTPATIENT)
Facility: CLINIC | Age: 78
End: 2023-07-21

## 2023-07-21 DIAGNOSIS — G90.09 IDIOPATHIC PERIPHERAL AUTONOMIC NEUROPATHY: ICD-10-CM

## 2023-07-21 RX ORDER — GABAPENTIN 300 MG/1
CAPSULE ORAL
Qty: 90 CAPSULE | Refills: 0 | Status: SHIPPED | OUTPATIENT
Start: 2023-07-21 | End: 2023-08-20

## 2023-07-21 NOTE — TELEPHONE ENCOUNTER
----- Message from Paco Andujar sent at 7/21/2023 10:13 AM EDT -----  Subject: Refill Request    QUESTIONS  Name of Medication? gabapentin (NEURONTIN) 300 MG capsule  Patient-reported dosage and instructions? TAKE 1 CAPSULE BY MOUTH EVERY   MORNING AND 2 CAPSULES EVERY EVENING  How many days do you have left? 0  Preferred Pharmacy? [id number]  Pharmacy phone number (if available)? [phone]  Additional Information for Provider? Pt called and stated that the RX was   sent to the wrong office. Pt would like for this RX to be sent to the   [address] [place] [address] in [place]. Please resend the RX to the right pharmacy and pt   would like a call back once this RX has been sent to the right pharmacy  ---------------------------------------------------------------------------  --------------  [address]  What is the best way for the office to contact you? OK to leave message on   voicemail  Preferred Call Back Phone Number? [phone]  ---------------------------------------------------------------------------  --------------  SCRIPT ANSWERS  Relationship to Patient?  Self

## 2023-07-27 RX ORDER — GABAPENTIN 300 MG/1
CAPSULE ORAL
Qty: 90 CAPSULE | OUTPATIENT
Start: 2023-07-27

## 2023-08-28 DIAGNOSIS — G90.09 IDIOPATHIC PERIPHERAL AUTONOMIC NEUROPATHY: ICD-10-CM

## 2023-08-28 NOTE — TELEPHONE ENCOUNTER
Spoke to pt, she stated that she needs to cancel her up coming appointment because she has a bunch of testing that needs to be done at [facility] that day and will not be able to make it to her appointment in time. Pt also requested a refill on the Gabapentin. Patient stated that once she gets the results of all the tests that she has to have done she will call to reschedule her appointment.

## 2023-08-28 NOTE — TELEPHONE ENCOUNTER
Pt called in regards to a missed call from our office.  No name in voicemail and it just told pt to call back   Pt has appointment 08/31/2023, but will be called 08/30/2023 as a reminder

## 2023-08-29 DIAGNOSIS — G90.09 IDIOPATHIC PERIPHERAL AUTONOMIC NEUROPATHY: ICD-10-CM

## 2023-08-29 RX ORDER — GABAPENTIN 300 MG/1
CAPSULE ORAL
Qty: 90 CAPSULE | Refills: 0 | Status: SHIPPED | OUTPATIENT
Start: 2023-08-29 | End: 2023-09-27

## 2023-08-29 RX ORDER — GABAPENTIN 300 MG/1
CAPSULE ORAL
Qty: 90 CAPSULE | OUTPATIENT
Start: 2023-08-29

## 2023-08-29 NOTE — TELEPHONE ENCOUNTER
I have approved a 30-day refill for gabapentin. However she has not been seen since January for management of this controlled substance. Unfortunately this will be the last refill until she is seen. I am happy to see her in a same-day slot if that helps her.

## 2023-09-26 DIAGNOSIS — G90.09 IDIOPATHIC PERIPHERAL AUTONOMIC NEUROPATHY: ICD-10-CM

## 2023-09-27 RX ORDER — GABAPENTIN 300 MG/1
CAPSULE ORAL
Qty: 90 CAPSULE | Refills: 0 | Status: SHIPPED | OUTPATIENT
Start: 2023-09-27 | End: 2023-10-27

## 2023-09-27 NOTE — TELEPHONE ENCOUNTER
Rx request for gabapentin from former Dr. Rachel Nava patient. Pt has upcoming appt.  reviewed and appropriate. Rx sent.

## 2023-10-02 ENCOUNTER — TELEPHONE (OUTPATIENT)
Facility: CLINIC | Age: 78
End: 2023-10-02

## 2023-10-02 NOTE — TELEPHONE ENCOUNTER
PT has pneumonia dx at [facility].  Pt scheduled for Hospital follow up with TEE

## 2023-10-06 ENCOUNTER — OFFICE VISIT (OUTPATIENT)
Facility: CLINIC | Age: 78
End: 2023-10-06

## 2023-10-06 VITALS
HEART RATE: 100 BPM | WEIGHT: 187 LBS | OXYGEN SATURATION: 97 % | SYSTOLIC BLOOD PRESSURE: 132 MMHG | RESPIRATION RATE: 16 BRPM | BODY MASS INDEX: 34.41 KG/M2 | HEIGHT: 62 IN | DIASTOLIC BLOOD PRESSURE: 86 MMHG

## 2023-10-06 DIAGNOSIS — M94.0 COSTOCHONDRITIS: ICD-10-CM

## 2023-10-06 DIAGNOSIS — J18.9 PNEUMONIA OF LEFT LUNG DUE TO INFECTIOUS ORGANISM, UNSPECIFIED PART OF LUNG: Primary | ICD-10-CM

## 2023-10-06 DIAGNOSIS — R53.81 DEBILITY: ICD-10-CM

## 2023-10-06 RX ORDER — MELOXICAM 7.5 MG/1
TABLET ORAL
Qty: 60 TABLET | Refills: 0 | Status: SHIPPED | OUTPATIENT
Start: 2023-10-06

## 2023-10-26 ENCOUNTER — HOSPITAL ENCOUNTER (OUTPATIENT)
Facility: HOSPITAL | Age: 78
Discharge: HOME | End: 2023-10-26
Payer: MEDICARE

## 2023-10-26 DIAGNOSIS — M25.561 RIGHT KNEE PAIN, UNSPECIFIED CHRONICITY: ICD-10-CM

## 2023-10-26 DIAGNOSIS — S83.511D SPRAIN OF ANTERIOR CRUCIATE LIGAMENT OF RIGHT KNEE, SUBSEQUENT ENCOUNTER: ICD-10-CM

## 2023-10-26 PROCEDURE — 73721 MRI JNT OF LWR EXTRE W/O DYE: CPT

## 2023-12-07 DIAGNOSIS — G90.09 IDIOPATHIC PERIPHERAL AUTONOMIC NEUROPATHY: ICD-10-CM

## 2023-12-07 NOTE — TELEPHONE ENCOUNTER
Rx request for gabapentin.  reviewed. Last fill of gabapentin was on 9/27 for a 30 day supply and since then she has been getting tramadol from her Orthopedic doctor. I don't recommend she take both of these chronically. Can we please call her to see what the plan is with her tramadol? If she is done taking this and would like to go back on her gabapentin I will send that in for her. Thank you!

## 2023-12-07 NOTE — TELEPHONE ENCOUNTER
Pt called in regards to needing a refill of Gabapentin 300mg to the [facility] at [address] [facility]

## 2023-12-08 RX ORDER — GABAPENTIN 300 MG/1
CAPSULE ORAL
Qty: 90 CAPSULE | Refills: 0 | Status: SHIPPED | OUTPATIENT
Start: 2023-12-08 | End: 2024-01-06

## 2023-12-08 NOTE — TELEPHONE ENCOUNTER
Spoke to pt. She stated that she had a fall recently and messed up her ACL in her knee and has been seeing orthopedic for this. She goes to Physical Therapy 2-3 times per week and the orthopedic doctor gave her those to take as needed at night to help her sleep because of the pain. She stated that she is only taking this until her knee is better or once Physical Therapy releases her. The tramadol will not be long term.

## 2023-12-11 RX ORDER — NYSTATIN 100000 [USP'U]/G
POWDER TOPICAL
Qty: 60 G | Refills: 5 | Status: SHIPPED | OUTPATIENT
Start: 2023-12-11
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
## 2024-01-07 ENCOUNTER — APPOINTMENT (OUTPATIENT)
Facility: HOSPITAL | Age: 79
DRG: 551 | End: 2024-01-07
Attending: STUDENT IN AN ORGANIZED HEALTH CARE EDUCATION/TRAINING PROGRAM
Payer: MEDICARE

## 2024-01-07 ENCOUNTER — APPOINTMENT (OUTPATIENT)
Facility: HOSPITAL | Age: 79
DRG: 551 | End: 2024-01-07
Payer: MEDICARE

## 2024-01-07 ENCOUNTER — HOSPITAL ENCOUNTER (EMERGENCY)
Facility: HOSPITAL | Age: 79
Discharge: HOME | DRG: 551 | End: 2024-01-10
Payer: MEDICARE

## 2024-01-07 ENCOUNTER — HOSPITAL ENCOUNTER (INPATIENT)
Facility: HOSPITAL | Age: 79
LOS: 10 days | Discharge: NURSING, REHAB OR LONG-TERM CARE | DRG: 551 | End: 2024-01-17
Attending: STUDENT IN AN ORGANIZED HEALTH CARE EDUCATION/TRAINING PROGRAM | Admitting: STUDENT IN AN ORGANIZED HEALTH CARE EDUCATION/TRAINING PROGRAM
Payer: MEDICARE

## 2024-01-07 DIAGNOSIS — M54.50 ACUTE MIDLINE LOW BACK PAIN, UNSPECIFIED WHETHER SCIATICA PRESENT: ICD-10-CM
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
## 2024-01-07 DIAGNOSIS — S32.502A CLOSED FRACTURE OF LEFT PUBIS, UNSPECIFIED PORTION OF PUBIS, INITIAL ENCOUNTER (HCC): Primary | ICD-10-CM

## 2024-01-07 LAB
25(OH)D3 SERPL-MCNC: 12.3 NG/ML (ref 30–100)
ABO + RH BLD: NORMAL
ALBUMIN SERPL-MCNC: 2.6 G/DL (ref 3.5–5)
ALBUMIN/GLOB SERPL: 0.8 (ref 1.1–2.2)
ALP SERPL-CCNC: 63 U/L (ref 45–117)
ALT SERPL-CCNC: 32 U/L (ref 12–78)
ANION GAP SERPL CALC-SCNC: 4 MMOL/L (ref 5–15)
AST SERPL-CCNC: 21 U/L (ref 15–37)
BASOPHILS # BLD: 0 K/UL (ref 0–0.1)
BASOPHILS NFR BLD: 0 % (ref 0–1)
BILIRUB SERPL-MCNC: 0.5 MG/DL (ref 0.2–1)
BLOOD GROUP ANTIBODIES SERPL: NORMAL
BUN SERPL-MCNC: 13 MG/DL (ref 6–20)
BUN/CREAT SERPL: 19 (ref 12–20)
CALCIUM SERPL-MCNC: 8.5 MG/DL (ref 8.5–10.1)
CHLORIDE SERPL-SCNC: 101 MMOL/L (ref 97–108)
CO2 SERPL-SCNC: 30 MMOL/L (ref 21–32)
CREAT SERPL-MCNC: 0.68 MG/DL (ref 0.55–1.02)
DIFFERENTIAL METHOD BLD: ABNORMAL
EOSINOPHIL # BLD: 0 K/UL (ref 0–0.4)
EOSINOPHIL NFR BLD: 0 % (ref 0–7)
ERYTHROCYTE [DISTWIDTH] IN BLOOD BY AUTOMATED COUNT: 14.8 % (ref 11.5–14.5)
GLOBULIN SER CALC-MCNC: 3.4 G/DL (ref 2–4)
GLUCOSE SERPL-MCNC: 91 MG/DL (ref 65–100)
HCT VFR BLD AUTO: 37.1 % (ref 35–47)
HGB BLD-MCNC: 12.2 G/DL (ref 11.5–16)
IMM GRANULOCYTES # BLD AUTO: 0.2 K/UL (ref 0–0.04)
IMM GRANULOCYTES NFR BLD AUTO: 2 % (ref 0–0.5)
LYMPHOCYTES # BLD: 1.7 K/UL (ref 0.8–3.5)
LYMPHOCYTES NFR BLD: 15 % (ref 12–49)
MCH RBC QN AUTO: 33.2 PG (ref 26–34)
MCHC RBC AUTO-ENTMCNC: 32.9 G/DL (ref 30–36.5)
MCV RBC AUTO: 100.8 FL (ref 80–99)
MONOCYTES # BLD: 0.8 K/UL (ref 0–1)
MONOCYTES NFR BLD: 7 % (ref 5–13)
NEUTS SEG # BLD: 8.4 K/UL (ref 1.8–8)
NEUTS SEG NFR BLD: 76 % (ref 32–75)
NRBC # BLD: 0 K/UL (ref 0–0.01)
NRBC BLD-RTO: 0 PER 100 WBC
PLATELET # BLD AUTO: 146 K/UL (ref 150–400)
PMV BLD AUTO: 9.1 FL (ref 8.9–12.9)
POTASSIUM SERPL-SCNC: 3.3 MMOL/L (ref 3.5–5.1)
PROT SERPL-MCNC: 6 G/DL (ref 6.4–8.2)
RBC # BLD AUTO: 3.68 M/UL (ref 3.8–5.2)
RBC MORPH BLD: ABNORMAL
RBC MORPH BLD: ABNORMAL
SODIUM SERPL-SCNC: 135 MMOL/L (ref 136–145)
SPECIMEN EXP DATE BLD: NORMAL
WBC # BLD AUTO: 11.1 K/UL (ref 3.6–11)

## 2024-01-07 PROCEDURE — 99285 EMERGENCY DEPT VISIT HI MDM: CPT

## 2024-01-07 PROCEDURE — 86850 RBC ANTIBODY SCREEN: CPT

## 2024-01-07 PROCEDURE — 72131 CT LUMBAR SPINE W/O DYE: CPT

## 2024-01-07 PROCEDURE — 6370000000 HC RX 637 (ALT 250 FOR IP): Performed by: STUDENT IN AN ORGANIZED HEALTH CARE EDUCATION/TRAINING PROGRAM

## 2024-01-07 PROCEDURE — 99222 1ST HOSP IP/OBS MODERATE 55: CPT | Performed by: NURSE PRACTITIONER

## 2024-01-07 PROCEDURE — 6360000002 HC RX W HCPCS: Performed by: STUDENT IN AN ORGANIZED HEALTH CARE EDUCATION/TRAINING PROGRAM

## 2024-01-07 PROCEDURE — 82306 VITAMIN D 25 HYDROXY: CPT

## 2024-01-07 PROCEDURE — 72192 CT PELVIS W/O DYE: CPT

## 2024-01-07 PROCEDURE — 2580000003 HC RX 258: Performed by: STUDENT IN AN ORGANIZED HEALTH CARE EDUCATION/TRAINING PROGRAM

## 2024-01-07 PROCEDURE — 1100000000 HC RM PRIVATE

## 2024-01-07 PROCEDURE — 70450 CT HEAD/BRAIN W/O DYE: CPT

## 2024-01-07 PROCEDURE — 36415 COLL VENOUS BLD VENIPUNCTURE: CPT

## 2024-01-07 PROCEDURE — 85025 COMPLETE CBC W/AUTO DIFF WBC: CPT

## 2024-01-07 PROCEDURE — 71045 X-RAY EXAM CHEST 1 VIEW: CPT

## 2024-01-07 PROCEDURE — 86901 BLOOD TYPING SEROLOGIC RH(D): CPT

## 2024-01-07 PROCEDURE — 94761 N-INVAS EAR/PLS OXIMETRY MLT: CPT

## 2024-01-07 PROCEDURE — 72148 MRI LUMBAR SPINE W/O DYE: CPT

## 2024-01-07 PROCEDURE — 72125 CT NECK SPINE W/O DYE: CPT

## 2024-01-07 PROCEDURE — 80053 COMPREHEN METABOLIC PANEL: CPT

## 2024-01-07 PROCEDURE — 86900 BLOOD TYPING SEROLOGIC ABO: CPT

## 2024-01-07 RX ORDER — MAGNESIUM SULFATE IN WATER 40 MG/ML
2000 INJECTION, SOLUTION INTRAVENOUS PRN
Status: DISCONTINUED | OUTPATIENT
Start: 2024-01-07 | End: 2024-01-17 | Stop reason: HOSPADM

## 2024-01-07 RX ORDER — ENOXAPARIN SODIUM 100 MG/ML
40 INJECTION SUBCUTANEOUS DAILY
Status: DISCONTINUED | OUTPATIENT
Start: 2024-01-07 | End: 2024-01-17 | Stop reason: HOSPADM

## 2024-01-07 RX ORDER — SODIUM CHLORIDE 9 MG/ML
INJECTION, SOLUTION INTRAVENOUS CONTINUOUS
Status: DISCONTINUED | OUTPATIENT
Start: 2024-01-07 | End: 2024-01-17 | Stop reason: HOSPADM

## 2024-01-07 RX ORDER — ONDANSETRON 2 MG/ML
4 INJECTION INTRAMUSCULAR; INTRAVENOUS EVERY 6 HOURS PRN
Status: DISCONTINUED | OUTPATIENT
Start: 2024-01-07 | End: 2024-01-17 | Stop reason: HOSPADM

## 2024-01-07 RX ORDER — POTASSIUM CHLORIDE 750 MG/1
40 TABLET, FILM COATED, EXTENDED RELEASE ORAL PRN
Status: DISCONTINUED | OUTPATIENT
Start: 2024-01-07 | End: 2024-01-17 | Stop reason: HOSPADM

## 2024-01-07 RX ORDER — BACLOFEN 10 MG/1
10 TABLET ORAL EVERY 12 HOURS PRN
Status: DISCONTINUED | OUTPATIENT
Start: 2024-01-07 | End: 2024-01-11

## 2024-01-07 RX ORDER — ACETAMINOPHEN 325 MG/1
650 TABLET ORAL EVERY 6 HOURS
Status: DISCONTINUED | OUTPATIENT
Start: 2024-01-07 | End: 2024-01-17 | Stop reason: HOSPADM

## 2024-01-07 RX ORDER — ATORVASTATIN CALCIUM 20 MG/1
80 TABLET, FILM COATED ORAL DAILY
Status: DISCONTINUED | OUTPATIENT
Start: 2024-01-07 | End: 2024-01-17 | Stop reason: HOSPADM

## 2024-01-07 RX ORDER — FUROSEMIDE 40 MG/1
40 TABLET ORAL EVERY OTHER DAY
Status: DISCONTINUED | OUTPATIENT
Start: 2024-01-07 | End: 2024-01-17 | Stop reason: HOSPADM

## 2024-01-07 RX ORDER — SACCHAROMYCES BOULARDII 250 MG
250 CAPSULE ORAL 2 TIMES DAILY
Status: DISCONTINUED | OUTPATIENT
Start: 2024-01-07 | End: 2024-01-07

## 2024-01-07 RX ORDER — HYDROCODONE BITARTRATE AND ACETAMINOPHEN 5; 325 MG/1; MG/1
1 TABLET ORAL
Status: COMPLETED | OUTPATIENT
Start: 2024-01-07 | End: 2024-01-07

## 2024-01-07 RX ORDER — OXYCODONE HYDROCHLORIDE 5 MG/1
5 TABLET ORAL EVERY 4 HOURS PRN
Status: DISCONTINUED | OUTPATIENT
Start: 2024-01-07 | End: 2024-01-11

## 2024-01-07 RX ORDER — CARVEDILOL 12.5 MG/1
12.5 TABLET ORAL 2 TIMES DAILY WITH MEALS
Status: DISCONTINUED | OUTPATIENT
Start: 2024-01-07 | End: 2024-01-11

## 2024-01-07 RX ORDER — POLYETHYLENE GLYCOL 3350 17 G/17G
17 POWDER, FOR SOLUTION ORAL DAILY PRN
Status: DISCONTINUED | OUTPATIENT
Start: 2024-01-07 | End: 2024-01-17 | Stop reason: HOSPADM

## 2024-01-07 RX ORDER — SODIUM CHLORIDE 0.9 % (FLUSH) 0.9 %
5-40 SYRINGE (ML) INJECTION PRN
Status: DISCONTINUED | OUTPATIENT
Start: 2024-01-07 | End: 2024-01-17 | Stop reason: HOSPADM

## 2024-01-07 RX ORDER — OXYCODONE HYDROCHLORIDE 5 MG/1
10 TABLET ORAL EVERY 4 HOURS PRN
Status: DISCONTINUED | OUTPATIENT
Start: 2024-01-07 | End: 2024-01-11

## 2024-01-07 RX ORDER — SODIUM CHLORIDE 0.9 % (FLUSH) 0.9 %
5-40 SYRINGE (ML) INJECTION EVERY 12 HOURS SCHEDULED
Status: DISCONTINUED | OUTPATIENT
Start: 2024-01-07 | End: 2024-01-17 | Stop reason: HOSPADM

## 2024-01-07 RX ORDER — GABAPENTIN 300 MG/1
300 CAPSULE ORAL 2 TIMES DAILY
Status: DISCONTINUED | OUTPATIENT
Start: 2024-01-07 | End: 2024-01-13

## 2024-01-07 RX ORDER — POTASSIUM CHLORIDE 7.45 MG/ML
10 INJECTION INTRAVENOUS PRN
Status: DISCONTINUED | OUTPATIENT
Start: 2024-01-07 | End: 2024-01-17 | Stop reason: HOSPADM

## 2024-01-07 RX ORDER — BUDESONIDE 3 MG/1
9 CAPSULE, COATED PELLETS ORAL DAILY
Status: DISCONTINUED | OUTPATIENT
Start: 2024-01-07 | End: 2024-01-17 | Stop reason: HOSPADM

## 2024-01-07 RX ORDER — ONDANSETRON 4 MG/1
4 TABLET, ORALLY DISINTEGRATING ORAL EVERY 8 HOURS PRN
Status: DISCONTINUED | OUTPATIENT
Start: 2024-01-07 | End: 2024-01-17 | Stop reason: HOSPADM

## 2024-01-07 RX ORDER — SODIUM CHLORIDE 9 MG/ML
INJECTION, SOLUTION INTRAVENOUS PRN
Status: DISCONTINUED | OUTPATIENT
Start: 2024-01-07 | End: 2024-01-17 | Stop reason: HOSPADM

## 2024-01-07 RX ORDER — LACTOBACILLUS RHAMNOSUS GG 10B CELL
1 CAPSULE ORAL 2 TIMES DAILY WITH MEALS
Status: DISCONTINUED | OUTPATIENT
Start: 2024-01-07 | End: 2024-01-17 | Stop reason: HOSPADM

## 2024-01-07 RX ADMIN — SODIUM CHLORIDE, PRESERVATIVE FREE 10 ML: 5 INJECTION INTRAVENOUS at 11:34

## 2024-01-07 RX ADMIN — Medication 1 CAPSULE: at 17:12

## 2024-01-07 RX ADMIN — OXYCODONE 10 MG: 5 TABLET ORAL at 22:01

## 2024-01-07 RX ADMIN — CARVEDILOL 12.5 MG: 12.5 TABLET, FILM COATED ORAL at 19:15

## 2024-01-07 RX ADMIN — ACETAMINOPHEN 650 MG: 325 TABLET ORAL at 19:57

## 2024-01-07 RX ADMIN — CARVEDILOL 12.5 MG: 12.5 TABLET, FILM COATED ORAL at 13:06

## 2024-01-07 RX ADMIN — ENOXAPARIN SODIUM 40 MG: 100 INJECTION SUBCUTANEOUS at 17:11

## 2024-01-07 RX ADMIN — GABAPENTIN 300 MG: 300 CAPSULE ORAL at 19:57

## 2024-01-07 RX ADMIN — ACETAMINOPHEN 650 MG: 325 TABLET ORAL at 13:06

## 2024-01-07 RX ADMIN — GABAPENTIN 300 MG: 300 CAPSULE ORAL at 13:06

## 2024-01-07 RX ADMIN — SODIUM CHLORIDE: 9 INJECTION, SOLUTION INTRAVENOUS at 14:02

## 2024-01-07 RX ADMIN — ATORVASTATIN CALCIUM 80 MG: 20 TABLET, FILM COATED ORAL at 13:07

## 2024-01-07 RX ADMIN — HYDROCODONE BITARTRATE AND ACETAMINOPHEN 1 TABLET: 5; 325 TABLET ORAL at 10:16

## 2024-01-07 ASSESSMENT — PAIN DESCRIPTION - ONSET: ONSET: PROGRESSIVE

## 2024-01-07 ASSESSMENT — PAIN DESCRIPTION - LOCATION
LOCATION: HIP

## 2024-01-07 ASSESSMENT — PAIN DESCRIPTION - DESCRIPTORS
DESCRIPTORS: DISCOMFORT
DESCRIPTORS: ACHING

## 2024-01-07 ASSESSMENT — LIFESTYLE VARIABLES
HOW OFTEN DO YOU HAVE A DRINK CONTAINING ALCOHOL: NEVER
HOW MANY STANDARD DRINKS CONTAINING ALCOHOL DO YOU HAVE ON A TYPICAL DAY: PATIENT DOES NOT DRINK

## 2024-01-07 ASSESSMENT — PAIN SCALES - GENERAL
PAINLEVEL_OUTOF10: 3
PAINLEVEL_OUTOF10: 10
PAINLEVEL_OUTOF10: 8
PAINLEVEL_OUTOF10: 3

## 2024-01-07 ASSESSMENT — PAIN DESCRIPTION - ORIENTATION
ORIENTATION: RIGHT
ORIENTATION: RIGHT
ORIENTATION: RIGHT;POSTERIOR

## 2024-01-07 ASSESSMENT — PAIN DESCRIPTION - PAIN TYPE: TYPE: ACUTE PAIN

## 2024-01-07 ASSESSMENT — PAIN DESCRIPTION - FREQUENCY: FREQUENCY: CONTINUOUS

## 2024-01-07 ASSESSMENT — PAIN - FUNCTIONAL ASSESSMENT
PAIN_FUNCTIONAL_ASSESSMENT: PREVENTS OR INTERFERES WITH MANY ACTIVE NOT PASSIVE ACTIVITIES
PAIN_FUNCTIONAL_ASSESSMENT: 0-10

## 2024-01-07 NOTE — H&P
colestipol (COLESTID) 1 g tablet TAKE 1 TO 2 TABLETS BY MOUTH AS NEEDED FOR DIARRHEA 6/14/21   Automatic Reconciliation, Ar   furosemide (LASIX) 40 MG tablet Take 1 tablet by mouth daily    Automatic Reconciliation, Ar   Pancrelipase, Lip-Prot-Amyl, 22768-069760 units CPEP TAKE ONE CAPSULE BY MOUTH THREE TIMES DAILY WITH MEALS AND ONE WITH SNACKS DAILY 9/24/21   Automatic Reconciliation, Ar   saccharomyces boulardii (FLORASTOR) 250 MG capsule Take 1 capsule by mouth 2 times daily    Automatic Reconciliation, Ar       REVIEW OF SYSTEMS:  See HPI for details  General: negative for fever, chills, sweats, weakness, weight loss  Eyes: negative for blurred vision, eye pain, loss of vision, diplopia  Ear Nose and Throat: negative for rhinorrhea, pharyngitis, otalgia, tinnitus, speech or swallowing difficulties  Respiratory:  negative for pleuritic pain, cough, sputum production, wheezing, SOB, DENG  Cardiology:  negative for chest pain, palpitations, orthopnea, PND, edema, syncope   Gastrointestinal: negative for abdominal pain, N/V, dysphagia, change in bowel habits, bleeding  Genitourinary: negative for frequency, urgency, dysuria, hematuria, incontinence  Muskuloskeletal : + Hip pain, leg pain   Hematology: negative for easy bruising, bleeding, lymphadenopathy  Dermatological: negative for rash, ulceration, mole change, new lesion  Endocrine: negative for hot flashes or polydipsia  Neurological: negative for headache, dizziness, confusion, focal weakness, paresthesia, memory loss, gait disturbance  Psychological: negative for anxiety, depression, agitation      Objective:   VITALS:    Vitals:    01/07/24 1030   BP: (!) 140/44   Pulse:    Resp:    Temp:    SpO2: 96%     PHYSICAL EXAM:    Physical Exam:    Gen: Elderly, overweight, in no acute distress  HEENT:  Pink conjunctivae, PERRL, hearing intact to voice, moist mucous membranes  Resp: No accessory muscle use, clear breath sounds without wheezes rales or

## 2024-01-07 NOTE — ED PROVIDER NOTES
Patient has had multiple falls in the last 2 days, fell of a chair this morning, struck her head did not lose consciousness she is currently on Plavix.  She also has left anterior pubic fracture.  Discussed case with orthopedics.  She also has a possible L2 fracture have ordered MRI.  She is nonambulatory.  Collected CBC and CMP.     Total critical care time spent exclusive of procedures:  0 minutes.       CONSULTS:  IP CONSULT TO HOSPITALIST    PROCEDURES:  Unless otherwise noted below, none     Procedures      FINAL IMPRESSION      1. Fall, initial encounter    2. Closed fracture of left pubis, unspecified portion of pubis, initial encounter ([facility])    3. Acute midline low back pain, unspecified whether sciatica present          DISPOSITION/PLAN   DISPOSITION Decision To Admit 01/07/2024 11:11:53 AM      PATIENT REFERRED TO:  No follow-up provider specified.    DISCHARGE MEDICATIONS:  New Prescriptions    No medications on file         (Please note that portions of this note were completed with a voice recognition program.  Efforts were made to edit the dictations but occasionally words are mis-transcribed.)    Philippe Montoya DO (electronically signed)  Emergency Attending Physician / Physician Assistant / Nurse Practitioner              Philippe Montoya DO  01/07/24 3172

## 2024-01-07 NOTE — ED TRIAGE NOTES
Pt fell yesterday when trying to put on her coat. Pt also was trying to get out of the chair this morning and slid to the floor. Denies any LOC or neck pain. + right posterior hip pain. Pt states she did hit head this morning and she takes blood thinners.

## 2024-01-07 NOTE — CONSULTS
bronchitis 12/23/2021    Aortic heart murmur     Idiopathic peripheral autonomic neuropathy 11/18/2020    History of aortic valve replacement with tissue graft 11/18/2020    Primary osteoarthritis of both knees 11/18/2020    Psoriasis 11/18/2020    Hypertriglyceridemia 11/18/2020    HTN (hypertension), benign 11/18/2020         Plan:   77 yo with left pubic body fx, L2 minimally displaced superior endplate fx-no retropulsion, nondispalced S2 fx   Discussed with patient about nature of condition and treatment options. (Daughter at bedside at well)  Will treat fractures conservatively. No surgical intervention required.   PT for WBAT/ pain control. Pt will likely need to go back to rehab.   Pt to follow up with Dr. Morataya at [facility] for repeat xrays in 2 weeks. Ortho to sign off, please reconsult as needed.   Discussed case with Dr. Palm and he agrees with above plan.        PABLO Patel - NP  Orthopaedic Surgery Nurse Practitioner

## 2024-01-08 LAB
ALBUMIN SERPL-MCNC: 2.3 G/DL (ref 3.5–5)
ALBUMIN/GLOB SERPL: 0.8 (ref 1.1–2.2)
ALP SERPL-CCNC: 57 U/L (ref 45–117)
ALT SERPL-CCNC: 28 U/L (ref 12–78)
ANION GAP SERPL CALC-SCNC: 4 MMOL/L (ref 5–15)
APPEARANCE UR: ABNORMAL
APPEARANCE UR: CLEAR
AST SERPL-CCNC: 17 U/L (ref 15–37)
BACTERIA URNS QL MICRO: ABNORMAL /HPF
BACTERIA URNS QL MICRO: ABNORMAL /HPF
BASOPHILS # BLD: 0 K/UL (ref 0–0.1)
BASOPHILS NFR BLD: 0 % (ref 0–1)
BILIRUB SERPL-MCNC: 0.7 MG/DL (ref 0.2–1)
BILIRUB UR QL: NEGATIVE
BILIRUB UR QL: NEGATIVE
BUN SERPL-MCNC: 14 MG/DL (ref 6–20)
BUN/CREAT SERPL: 24 (ref 12–20)
CALCIUM SERPL-MCNC: 8 MG/DL (ref 8.5–10.1)
CHLORIDE SERPL-SCNC: 104 MMOL/L (ref 97–108)
CO2 SERPL-SCNC: 28 MMOL/L (ref 21–32)
COLOR UR: ABNORMAL
COLOR UR: ABNORMAL
COMMENT:: NORMAL
CREAT SERPL-MCNC: 0.59 MG/DL (ref 0.55–1.02)
DIFFERENTIAL METHOD BLD: ABNORMAL
EOSINOPHIL # BLD: 0 K/UL (ref 0–0.4)
EOSINOPHIL NFR BLD: 0 % (ref 0–7)
EPITH CASTS URNS QL MICRO: ABNORMAL /LPF
EPITH CASTS URNS QL MICRO: ABNORMAL /LPF
ERYTHROCYTE [DISTWIDTH] IN BLOOD BY AUTOMATED COUNT: 14.7 % (ref 11.5–14.5)
FERRITIN SERPL-MCNC: 423 NG/ML (ref 8–252)
FOLATE SERPL-MCNC: 5.7 NG/ML (ref 5–21)
GLOBULIN SER CALC-MCNC: 2.9 G/DL (ref 2–4)
GLUCOSE SERPL-MCNC: 95 MG/DL (ref 65–100)
GLUCOSE UR STRIP.AUTO-MCNC: NEGATIVE MG/DL
GLUCOSE UR STRIP.AUTO-MCNC: NEGATIVE MG/DL
HCT VFR BLD AUTO: 32.7 % (ref 35–47)
HGB BLD-MCNC: 10.3 G/DL (ref 11.5–16)
HGB UR QL STRIP: NEGATIVE
HGB UR QL STRIP: NEGATIVE
IMM GRANULOCYTES # BLD AUTO: 0.2 K/UL (ref 0–0.04)
IMM GRANULOCYTES NFR BLD AUTO: 2 % (ref 0–0.5)
IRON SATN MFR SERPL: 13 % (ref 20–50)
IRON SERPL-MCNC: 29 UG/DL (ref 35–150)
KETONES UR QL STRIP.AUTO: NEGATIVE MG/DL
KETONES UR QL STRIP.AUTO: NEGATIVE MG/DL
LEUKOCYTE ESTERASE UR QL STRIP.AUTO: ABNORMAL
LEUKOCYTE ESTERASE UR QL STRIP.AUTO: NEGATIVE
LYMPHOCYTES # BLD: 1.3 K/UL (ref 0.8–3.5)
LYMPHOCYTES NFR BLD: 14 % (ref 12–49)
MAGNESIUM SERPL-MCNC: 2 MG/DL (ref 1.6–2.4)
MCH RBC QN AUTO: 32.5 PG (ref 26–34)
MCHC RBC AUTO-ENTMCNC: 31.5 G/DL (ref 30–36.5)
MCV RBC AUTO: 103.2 FL (ref 80–99)
MONOCYTES # BLD: 0.6 K/UL (ref 0–1)
MONOCYTES NFR BLD: 7 % (ref 5–13)
NEUTS SEG # BLD: 6.8 K/UL (ref 1.8–8)
NEUTS SEG NFR BLD: 77 % (ref 32–75)
NITRITE UR QL STRIP.AUTO: NEGATIVE
NITRITE UR QL STRIP.AUTO: NEGATIVE
NRBC # BLD: 0 K/UL (ref 0–0.01)
NRBC BLD-RTO: 0 PER 100 WBC
PH UR STRIP: 7 (ref 5–8)
PH UR STRIP: 7 (ref 5–8)
PLATELET # BLD AUTO: 133 K/UL (ref 150–400)
PMV BLD AUTO: 9.7 FL (ref 8.9–12.9)
POTASSIUM SERPL-SCNC: 3.3 MMOL/L (ref 3.5–5.1)
PROT SERPL-MCNC: 5.2 G/DL (ref 6.4–8.2)
PROT UR STRIP-MCNC: NEGATIVE MG/DL
PROT UR STRIP-MCNC: NEGATIVE MG/DL
RBC # BLD AUTO: 3.17 M/UL (ref 3.8–5.2)
RBC #/AREA URNS HPF: ABNORMAL /HPF (ref 0–5)
RBC #/AREA URNS HPF: ABNORMAL /HPF (ref 0–5)
SODIUM SERPL-SCNC: 136 MMOL/L (ref 136–145)
SP GR UR REFRACTOMETRY: 1.01 (ref 1–1.03)
SP GR UR REFRACTOMETRY: 1.01 (ref 1–1.03)
SPECIMEN HOLD: NORMAL
TIBC SERPL-MCNC: 218 UG/DL (ref 250–450)
TSH SERPL DL<=0.05 MIU/L-ACNC: 0.56 UIU/ML (ref 0.36–3.74)
UROBILINOGEN UR QL STRIP.AUTO: 0.2 EU/DL (ref 0.2–1)
UROBILINOGEN UR QL STRIP.AUTO: 0.2 EU/DL (ref 0.2–1)
VIT B12 SERPL-MCNC: 160 PG/ML (ref 193–986)
WBC # BLD AUTO: 8.9 K/UL (ref 3.6–11)
WBC URNS QL MICRO: ABNORMAL /HPF (ref 0–4)
WBC URNS QL MICRO: ABNORMAL /HPF (ref 0–4)
YEAST URNS QL MICRO: PRESENT

## 2024-01-08 PROCEDURE — 83550 IRON BINDING TEST: CPT

## 2024-01-08 PROCEDURE — 6360000002 HC RX W HCPCS: Performed by: STUDENT IN AN ORGANIZED HEALTH CARE EDUCATION/TRAINING PROGRAM

## 2024-01-08 PROCEDURE — 82607 VITAMIN B-12: CPT

## 2024-01-08 PROCEDURE — 82728 ASSAY OF FERRITIN: CPT

## 2024-01-08 PROCEDURE — 82746 ASSAY OF FOLIC ACID SERUM: CPT

## 2024-01-08 PROCEDURE — 94761 N-INVAS EAR/PLS OXIMETRY MLT: CPT

## 2024-01-08 PROCEDURE — 80053 COMPREHEN METABOLIC PANEL: CPT

## 2024-01-08 PROCEDURE — 2580000003 HC RX 258: Performed by: STUDENT IN AN ORGANIZED HEALTH CARE EDUCATION/TRAINING PROGRAM

## 2024-01-08 PROCEDURE — 1100000000 HC RM PRIVATE

## 2024-01-08 PROCEDURE — 85025 COMPLETE CBC W/AUTO DIFF WBC: CPT

## 2024-01-08 PROCEDURE — 6370000000 HC RX 637 (ALT 250 FOR IP): Performed by: STUDENT IN AN ORGANIZED HEALTH CARE EDUCATION/TRAINING PROGRAM

## 2024-01-08 PROCEDURE — 81001 URINALYSIS AUTO W/SCOPE: CPT

## 2024-01-08 PROCEDURE — 83735 ASSAY OF MAGNESIUM: CPT

## 2024-01-08 PROCEDURE — 84443 ASSAY THYROID STIM HORMONE: CPT

## 2024-01-08 PROCEDURE — 83540 ASSAY OF IRON: CPT

## 2024-01-08 RX ORDER — POTASSIUM CHLORIDE 750 MG/1
40 TABLET, FILM COATED, EXTENDED RELEASE ORAL ONCE
Status: DISCONTINUED | OUTPATIENT
Start: 2024-01-08 | End: 2024-01-17 | Stop reason: HOSPADM

## 2024-01-08 RX ORDER — FOLIC ACID 1 MG/1
1 TABLET ORAL DAILY
Status: DISCONTINUED | OUTPATIENT
Start: 2024-01-08 | End: 2024-01-17 | Stop reason: HOSPADM

## 2024-01-08 RX ORDER — HYDRALAZINE HYDROCHLORIDE 20 MG/ML
10 INJECTION INTRAMUSCULAR; INTRAVENOUS EVERY 6 HOURS PRN
Status: DISCONTINUED | OUTPATIENT
Start: 2024-01-08 | End: 2024-01-17 | Stop reason: HOSPADM

## 2024-01-08 RX ORDER — LORAZEPAM 2 MG/ML
1 INJECTION INTRAMUSCULAR EVERY 6 HOURS PRN
Status: DISCONTINUED | OUTPATIENT
Start: 2024-01-08 | End: 2024-01-11

## 2024-01-08 RX ORDER — 0.9 % SODIUM CHLORIDE 0.9 %
1000 INTRAVENOUS SOLUTION INTRAVENOUS ONCE
Status: COMPLETED | OUTPATIENT
Start: 2024-01-08 | End: 2024-01-08

## 2024-01-08 RX ORDER — CYANOCOBALAMIN 1000 UG/ML
1000 INJECTION, SOLUTION INTRAMUSCULAR; SUBCUTANEOUS DAILY
Status: DISPENSED | OUTPATIENT
Start: 2024-01-08 | End: 2024-01-11

## 2024-01-08 RX ORDER — CHOLESTYRAMINE LIGHT 4 G/5.7G
4 POWDER, FOR SUSPENSION ORAL DAILY
Status: DISCONTINUED | OUTPATIENT
Start: 2024-01-08 | End: 2024-01-17 | Stop reason: HOSPADM

## 2024-01-08 RX ADMIN — Medication 1 CAPSULE: at 09:07

## 2024-01-08 RX ADMIN — WATER 1000 MG: 1 INJECTION INTRAMUSCULAR; INTRAVENOUS; SUBCUTANEOUS at 19:17

## 2024-01-08 RX ADMIN — GABAPENTIN 300 MG: 300 CAPSULE ORAL at 20:23

## 2024-01-08 RX ADMIN — CARVEDILOL 12.5 MG: 12.5 TABLET, FILM COATED ORAL at 09:07

## 2024-01-08 RX ADMIN — OXYCODONE 5 MG: 5 TABLET ORAL at 16:03

## 2024-01-08 RX ADMIN — ACETAMINOPHEN 650 MG: 325 TABLET ORAL at 19:17

## 2024-01-08 RX ADMIN — CARVEDILOL 12.5 MG: 12.5 TABLET, FILM COATED ORAL at 16:03

## 2024-01-08 RX ADMIN — SODIUM CHLORIDE 1000 ML: 9 INJECTION, SOLUTION INTRAVENOUS at 12:44

## 2024-01-08 RX ADMIN — ACETAMINOPHEN 650 MG: 325 TABLET ORAL at 23:50

## 2024-01-08 RX ADMIN — ATORVASTATIN CALCIUM 80 MG: 20 TABLET, FILM COATED ORAL at 09:07

## 2024-01-08 RX ADMIN — ACETAMINOPHEN 650 MG: 325 TABLET ORAL at 04:42

## 2024-01-08 RX ADMIN — OXYCODONE 5 MG: 5 TABLET ORAL at 20:22

## 2024-01-08 RX ADMIN — Medication 1 CAPSULE: at 16:03

## 2024-01-08 RX ADMIN — GABAPENTIN 300 MG: 300 CAPSULE ORAL at 09:06

## 2024-01-08 RX ADMIN — ACETAMINOPHEN 650 MG: 325 TABLET ORAL at 09:06

## 2024-01-08 RX ADMIN — ENOXAPARIN SODIUM 40 MG: 100 INJECTION SUBCUTANEOUS at 09:07

## 2024-01-08 ASSESSMENT — PAIN SCALES - GENERAL
PAINLEVEL_OUTOF10: 8
PAINLEVEL_OUTOF10: 4
PAINLEVEL_OUTOF10: 4
PAINLEVEL_OUTOF10: 3

## 2024-01-08 ASSESSMENT — PAIN DESCRIPTION - DESCRIPTORS
DESCRIPTORS: THROBBING
DESCRIPTORS: ACHING

## 2024-01-08 ASSESSMENT — PAIN DESCRIPTION - ORIENTATION
ORIENTATION: RIGHT
ORIENTATION: RIGHT

## 2024-01-08 ASSESSMENT — PAIN DESCRIPTION - LOCATION
LOCATION: KNEE
LOCATION: PELVIS
LOCATION: LEG
LOCATION: PELVIS

## 2024-01-08 NOTE — FLOWSHEET NOTE
Patient arrived to room 504. Oriented to unit. Son elba is at bedside.   Vitals:    01/07/24 2145   BP: (!) 140/72   Pulse: 74   Resp: 18   Temp: 98.2 °F (36.8 °C)   SpO2:

## 2024-01-08 NOTE — ED NOTES
TRANSFER - OUT REPORT:    Verbal report given to Dominik on Sujata Ernandez  being transferred to [address] [place] [address] for routine progression of patient care       Report consisted of patient's Situation, Background, Assessment and   Recommendations(SBAR).     Information from the following report(s) Nurse Handoff Report, Adult Overview, MAR, and Recent Results was reviewed with the receiving nurse.    [place] Fall Assessment:    Presents to emergency department  because of falls (Syncope, seizure, or loss of consciousness): Yes  Age > 70: Yes  Altered Mental Status, Intoxication with alcohol or substance confusion (Disorientation, impaired judgment, poor safety awaremess, or inability to follow instructions): No  Impaired Mobility: Ambulates or transfers with assistive devices or assistance; Unable to ambulate or transer.: Yes  Nursing Judgement: Yes          Lines:   Peripheral IV 01/07/24 Distal;Left;Anterior Cephalic (Active)        Opportunity for questions and clarification was provided.      Patient transported with:  Tech

## 2024-01-08 NOTE — CARE COORDINATION
1/8/2024    4:25 PM  CM received p/c from pt's DTR, who reported [facility] as preference if rehab is recommended. PT/OT evals pending.    2:39 PM  CM attempted p/c to pt's DTR, Valarie, to discuss dispo. CM left HIPAA compliant voicemail.     2:22 PM  Care Management Progress Note      ICD-10-CM    1. Fall, initial encounter  W19.XXXA       2. Closed fracture of left pubis, unspecified portion of pubis, initial encounter ([facility])  S32.502A       3. Acute midline low back pain, unspecified whether sciatica present  M54.50           RUR:  18%  Readmission -   Risk Level: []Low [x]Moderate []High    Transition of care plan:  Awaiting medical clearance and DC order. PT/OT treating. Ortho following.   Rehab vs SNF - CM met with pt and pt's caregiver, Mrs Figueroa, for assessment and dispo planning. Pt lives with her DTR and DTR's family in a [facility] suite with a ramped entrance. Pt [facility]. Pt discharged from [facility] on 12/31/23. CM noted rehab is recommended. CM to follow-up with pt's family re dispo preferences.   Outpatient follow-up.  Transport need TBD.        01/08/24 1418   Service Assessment   Patient Orientation Alert and Oriented   Cognition Alert   History Provided By Patient   Primary Caregiver Private caregiver  (Mrs Figueroa)   Support Systems Family Members;Home Care Staff  (Personal care aide)   Patient's Healthcare Decision Maker is: Legal Next of Kin   PCP Verified by CM Yes   Last Visit to PCP Within last 6 months   Prior Functional Level Assistance with the following:   Current Functional Level Assistance with the following:   Can patient return to prior living arrangement Unknown at present   Ability to make needs known: Fair   Family able to assist with home care needs: Other (comment)  (Personal care aide and family as needed)   Would you like for me to discuss the discharge plan with any other family members/significant others, and if so, who? Yes  (As needed)   Financial Resources

## 2024-01-09 LAB
GLUCOSE BLD STRIP.AUTO-MCNC: 223 MG/DL (ref 65–117)
SERVICE CMNT-IMP: ABNORMAL

## 2024-01-09 PROCEDURE — 6370000000 HC RX 637 (ALT 250 FOR IP): Performed by: STUDENT IN AN ORGANIZED HEALTH CARE EDUCATION/TRAINING PROGRAM

## 2024-01-09 PROCEDURE — 97530 THERAPEUTIC ACTIVITIES: CPT

## 2024-01-09 PROCEDURE — 97161 PT EVAL LOW COMPLEX 20 MIN: CPT

## 2024-01-09 PROCEDURE — 82962 GLUCOSE BLOOD TEST: CPT

## 2024-01-09 PROCEDURE — 97165 OT EVAL LOW COMPLEX 30 MIN: CPT

## 2024-01-09 PROCEDURE — 1100000000 HC RM PRIVATE

## 2024-01-09 PROCEDURE — 6360000002 HC RX W HCPCS: Performed by: STUDENT IN AN ORGANIZED HEALTH CARE EDUCATION/TRAINING PROGRAM

## 2024-01-09 PROCEDURE — 2580000003 HC RX 258: Performed by: STUDENT IN AN ORGANIZED HEALTH CARE EDUCATION/TRAINING PROGRAM

## 2024-01-09 PROCEDURE — 94761 N-INVAS EAR/PLS OXIMETRY MLT: CPT

## 2024-01-09 PROCEDURE — 97535 SELF CARE MNGMENT TRAINING: CPT

## 2024-01-09 RX ADMIN — BUDESONIDE 9 MG: 3 CAPSULE, GELATIN COATED ORAL at 09:03

## 2024-01-09 RX ADMIN — CHOLESTYRAMINE 4 G: 4 POWDER, FOR SUSPENSION ORAL at 09:04

## 2024-01-09 RX ADMIN — ACETAMINOPHEN 650 MG: 325 TABLET ORAL at 09:03

## 2024-01-09 RX ADMIN — Medication 1 CAPSULE: at 16:52

## 2024-01-09 RX ADMIN — SODIUM CHLORIDE: 9 INJECTION, SOLUTION INTRAVENOUS at 02:18

## 2024-01-09 RX ADMIN — GABAPENTIN 300 MG: 300 CAPSULE ORAL at 21:03

## 2024-01-09 RX ADMIN — SODIUM CHLORIDE: 9 INJECTION, SOLUTION INTRAVENOUS at 12:49

## 2024-01-09 RX ADMIN — WATER 1000 MG: 1 INJECTION INTRAMUSCULAR; INTRAVENOUS; SUBCUTANEOUS at 16:53

## 2024-01-09 RX ADMIN — SODIUM CHLORIDE, PRESERVATIVE FREE 10 ML: 5 INJECTION INTRAVENOUS at 09:04

## 2024-01-09 RX ADMIN — Medication 1 CAPSULE: at 09:03

## 2024-01-09 RX ADMIN — OXYCODONE 5 MG: 5 TABLET ORAL at 21:08

## 2024-01-09 RX ADMIN — ATORVASTATIN CALCIUM 80 MG: 20 TABLET, FILM COATED ORAL at 09:03

## 2024-01-09 RX ADMIN — ACETAMINOPHEN 650 MG: 325 TABLET ORAL at 18:17

## 2024-01-09 RX ADMIN — GABAPENTIN 300 MG: 300 CAPSULE ORAL at 09:03

## 2024-01-09 RX ADMIN — CARVEDILOL 12.5 MG: 12.5 TABLET, FILM COATED ORAL at 16:52

## 2024-01-09 RX ADMIN — CARVEDILOL 12.5 MG: 12.5 TABLET, FILM COATED ORAL at 09:03

## 2024-01-09 RX ADMIN — ACETAMINOPHEN 650 MG: 325 TABLET ORAL at 14:22

## 2024-01-09 RX ADMIN — SODIUM CHLORIDE, PRESERVATIVE FREE 10 ML: 5 INJECTION INTRAVENOUS at 02:20

## 2024-01-09 RX ADMIN — ENOXAPARIN SODIUM 40 MG: 100 INJECTION SUBCUTANEOUS at 09:03

## 2024-01-09 RX ADMIN — LORAZEPAM 1 MG: 2 INJECTION INTRAMUSCULAR; INTRAVENOUS at 03:59

## 2024-01-09 RX ADMIN — FUROSEMIDE 40 MG: 40 TABLET ORAL at 09:09

## 2024-01-09 RX ADMIN — SODIUM CHLORIDE, PRESERVATIVE FREE 10 ML: 5 INJECTION INTRAVENOUS at 21:03

## 2024-01-09 RX ADMIN — FOLIC ACID 1 MG: 1 TABLET ORAL at 09:03

## 2024-01-09 ASSESSMENT — PAIN DESCRIPTION - ORIENTATION
ORIENTATION: RIGHT
ORIENTATION: LEFT
ORIENTATION: RIGHT
ORIENTATION: RIGHT

## 2024-01-09 ASSESSMENT — PAIN SCALES - GENERAL
PAINLEVEL_OUTOF10: 4
PAINLEVEL_OUTOF10: 4
PAINLEVEL_OUTOF10: 3
PAINLEVEL_OUTOF10: 3
PAINLEVEL_OUTOF10: 5

## 2024-01-09 ASSESSMENT — PAIN DESCRIPTION - DESCRIPTORS
DESCRIPTORS: THROBBING
DESCRIPTORS: THROBBING

## 2024-01-09 ASSESSMENT — PAIN DESCRIPTION - LOCATION
LOCATION: LEG
LOCATION: LEG
LOCATION: ANKLE
LOCATION: LEG;ANKLE

## 2024-01-09 NOTE — CARE COORDINATION
1/9/2024  1:40 PM  Care Management Progress Note      ICD-10-CM    1. Fall, initial encounter  W19.XXXA       2. Closed fracture of left pubis, unspecified portion of pubis, initial encounter ([facility])  S32.502A       3. Acute midline low back pain, unspecified whether sciatica present  M54.50           RUR:  18%  Risk Level: []Low [x]Moderate []High    Transition of care plan:  Awaiting medical clearance and DC order. PT/OT treating.   IPR - Referral pending with [facility] for IPR. If denied, family will provide additional preferences.   Outpatient follow-up.  Transport need TBD.

## 2024-01-10 PROCEDURE — 6370000000 HC RX 637 (ALT 250 FOR IP): Performed by: STUDENT IN AN ORGANIZED HEALTH CARE EDUCATION/TRAINING PROGRAM

## 2024-01-10 PROCEDURE — 6370000000 HC RX 637 (ALT 250 FOR IP): Performed by: INTERNAL MEDICINE

## 2024-01-10 PROCEDURE — 94761 N-INVAS EAR/PLS OXIMETRY MLT: CPT

## 2024-01-10 PROCEDURE — 2580000003 HC RX 258: Performed by: STUDENT IN AN ORGANIZED HEALTH CARE EDUCATION/TRAINING PROGRAM

## 2024-01-10 PROCEDURE — 97116 GAIT TRAINING THERAPY: CPT

## 2024-01-10 PROCEDURE — 1100000000 HC RM PRIVATE

## 2024-01-10 PROCEDURE — 6360000002 HC RX W HCPCS: Performed by: INTERNAL MEDICINE

## 2024-01-10 PROCEDURE — 2580000003 HC RX 258: Performed by: INTERNAL MEDICINE

## 2024-01-10 PROCEDURE — 6360000002 HC RX W HCPCS: Performed by: STUDENT IN AN ORGANIZED HEALTH CARE EDUCATION/TRAINING PROGRAM

## 2024-01-10 PROCEDURE — 97530 THERAPEUTIC ACTIVITIES: CPT

## 2024-01-10 RX ORDER — CHOLECALCIFEROL (VITAMIN D3) 125 MCG
500 CAPSULE ORAL DAILY
Status: DISCONTINUED | OUTPATIENT
Start: 2024-01-11 | End: 2024-01-17 | Stop reason: HOSPADM

## 2024-01-10 RX ADMIN — SODIUM CHLORIDE, PRESERVATIVE FREE 10 ML: 5 INJECTION INTRAVENOUS at 09:11

## 2024-01-10 RX ADMIN — ACETAMINOPHEN 650 MG: 325 TABLET ORAL at 00:00

## 2024-01-10 RX ADMIN — GABAPENTIN 300 MG: 300 CAPSULE ORAL at 09:03

## 2024-01-10 RX ADMIN — WATER 1000 MG: 1 INJECTION INTRAMUSCULAR; INTRAVENOUS; SUBCUTANEOUS at 18:04

## 2024-01-10 RX ADMIN — CHOLESTYRAMINE 4 G: 4 POWDER, FOR SUSPENSION ORAL at 09:04

## 2024-01-10 RX ADMIN — ACETAMINOPHEN 650 MG: 325 TABLET ORAL at 23:51

## 2024-01-10 RX ADMIN — ACETAMINOPHEN 650 MG: 325 TABLET ORAL at 05:40

## 2024-01-10 RX ADMIN — CYANOCOBALAMIN 1000 MCG: 1000 INJECTION, SOLUTION INTRAMUSCULAR; SUBCUTANEOUS at 09:05

## 2024-01-10 RX ADMIN — BUDESONIDE 9 MG: 3 CAPSULE, GELATIN COATED ORAL at 09:03

## 2024-01-10 RX ADMIN — ACETAMINOPHEN 650 MG: 325 TABLET ORAL at 18:04

## 2024-01-10 RX ADMIN — CARVEDILOL 12.5 MG: 12.5 TABLET, FILM COATED ORAL at 09:03

## 2024-01-10 RX ADMIN — OXYCODONE 10 MG: 5 TABLET ORAL at 22:52

## 2024-01-10 RX ADMIN — MICONAZOLE NITRATE 1 APPLICATOR: 20 CREAM VAGINAL at 20:26

## 2024-01-10 RX ADMIN — GABAPENTIN 300 MG: 300 CAPSULE ORAL at 20:20

## 2024-01-10 RX ADMIN — ENOXAPARIN SODIUM 40 MG: 100 INJECTION SUBCUTANEOUS at 09:03

## 2024-01-10 RX ADMIN — Medication 1 CAPSULE: at 09:03

## 2024-01-10 RX ADMIN — CARVEDILOL 12.5 MG: 12.5 TABLET, FILM COATED ORAL at 18:04

## 2024-01-10 RX ADMIN — FOLIC ACID 1 MG: 1 TABLET ORAL at 09:02

## 2024-01-10 RX ADMIN — Medication 1 CAPSULE: at 18:03

## 2024-01-10 RX ADMIN — SODIUM CHLORIDE, PRESERVATIVE FREE 10 ML: 5 INJECTION INTRAVENOUS at 20:20

## 2024-01-10 RX ADMIN — HYDRALAZINE HYDROCHLORIDE 10 MG: 20 INJECTION INTRAMUSCULAR; INTRAVENOUS at 23:50

## 2024-01-10 RX ADMIN — ATORVASTATIN CALCIUM 80 MG: 20 TABLET, FILM COATED ORAL at 09:03

## 2024-01-10 RX ADMIN — ACETAMINOPHEN 650 MG: 325 TABLET ORAL at 12:50

## 2024-01-10 ASSESSMENT — PAIN DESCRIPTION - LOCATION
LOCATION: LEG

## 2024-01-10 ASSESSMENT — PAIN SCALES - GENERAL
PAINLEVEL_OUTOF10: 8
PAINLEVEL_OUTOF10: 5
PAINLEVEL_OUTOF10: 5

## 2024-01-10 ASSESSMENT — PAIN DESCRIPTION - ORIENTATION
ORIENTATION: RIGHT

## 2024-01-10 NOTE — CARE COORDINATION
1/10/2024  4:17 PM  Care Management Progress Note      ICD-10-CM    1. Fall, initial encounter  W19.XXXA       2. Closed fracture of left pubis, unspecified portion of pubis, initial encounter ([facility])  S32.502A       3. Acute midline low back pain, unspecified whether sciatica present  M54.50           RUR:  18%  Risk Level: []Low [x]Moderate []High    Transition of care plan:  Awaiting medical clearance and DC order. PT/OT treating.   SNF - CM received denial from [facility] as they are recommending SNF. Cm attempted p/c to pt's DTR, Valarie, to discuss additional SNF preferences and left a voicemail.   Outpatient follow-up.  Stretcher transport likely required.

## 2024-01-11 ENCOUNTER — APPOINTMENT (OUTPATIENT)
Facility: HOSPITAL | Age: 79
DRG: 551 | End: 2024-01-11
Payer: MEDICARE

## 2024-01-11 LAB
ALBUMIN SERPL-MCNC: 2.2 G/DL (ref 3.5–5)
ANION GAP SERPL CALC-SCNC: 6 MMOL/L (ref 5–15)
BASE EXCESS BLDV CALC-SCNC: 2.6 MMOL/L
BASOPHILS # BLD: 0 K/UL (ref 0–0.1)
BASOPHILS NFR BLD: 0 % (ref 0–1)
BDY SITE: ABNORMAL
BUN SERPL-MCNC: 11 MG/DL (ref 6–20)
BUN/CREAT SERPL: 22 (ref 12–20)
CALCIUM SERPL-MCNC: 8 MG/DL (ref 8.5–10.1)
CHLORIDE SERPL-SCNC: 107 MMOL/L (ref 97–108)
CO2 SERPL-SCNC: 25 MMOL/L (ref 21–32)
CREAT SERPL-MCNC: 0.49 MG/DL (ref 0.55–1.02)
DIFFERENTIAL METHOD BLD: ABNORMAL
EOSINOPHIL # BLD: 0 K/UL (ref 0–0.4)
EOSINOPHIL NFR BLD: 0 % (ref 0–7)
ERYTHROCYTE [DISTWIDTH] IN BLOOD BY AUTOMATED COUNT: 14.7 % (ref 11.5–14.5)
GLUCOSE SERPL-MCNC: 116 MG/DL (ref 65–100)
HCO3 BLDV-SCNC: 25 MMOL/L (ref 23–28)
HCT VFR BLD AUTO: 33.1 % (ref 35–47)
HGB BLD-MCNC: 11 G/DL (ref 11.5–16)
IMM GRANULOCYTES # BLD AUTO: 0.2 K/UL (ref 0–0.04)
IMM GRANULOCYTES NFR BLD AUTO: 2 % (ref 0–0.5)
LYMPHOCYTES # BLD: 1.9 K/UL (ref 0.8–3.5)
LYMPHOCYTES NFR BLD: 24 % (ref 12–49)
MAGNESIUM SERPL-MCNC: 1.6 MG/DL (ref 1.6–2.4)
MCH RBC QN AUTO: 32.9 PG (ref 26–34)
MCHC RBC AUTO-ENTMCNC: 33.2 G/DL (ref 30–36.5)
MCV RBC AUTO: 99.1 FL (ref 80–99)
MONOCYTES # BLD: 0.6 K/UL (ref 0–1)
MONOCYTES NFR BLD: 7 % (ref 5–13)
NEUTS SEG # BLD: 5.2 K/UL (ref 1.8–8)
NEUTS SEG NFR BLD: 67 % (ref 32–75)
NRBC # BLD: 0 K/UL (ref 0–0.01)
NRBC BLD-RTO: 0 PER 100 WBC
PCO2 BLDV: 32.1 MMHG (ref 41–51)
PH BLDV: 7.51 (ref 7.32–7.42)
PHOSPHATE SERPL-MCNC: 2.6 MG/DL (ref 2.6–4.7)
PLATELET # BLD AUTO: 158 K/UL (ref 150–400)
PMV BLD AUTO: 9.8 FL (ref 8.9–12.9)
PO2 BLDV: 79 MMHG (ref 25–40)
POTASSIUM SERPL-SCNC: 3.5 MMOL/L (ref 3.5–5.1)
RBC # BLD AUTO: 3.34 M/UL (ref 3.8–5.2)
RBC MORPH BLD: ABNORMAL
SAO2 % BLDV: 97 % (ref 65–88)
SAO2% DEVICE SAO2% SENSOR NAME: ABNORMAL
SERVICE CMNT-IMP: ABNORMAL
SODIUM SERPL-SCNC: 138 MMOL/L (ref 136–145)
SPECIMEN SITE: ABNORMAL
WBC # BLD AUTO: 7.9 K/UL (ref 3.6–11)

## 2024-01-11 PROCEDURE — 6370000000 HC RX 637 (ALT 250 FOR IP): Performed by: INTERNAL MEDICINE

## 2024-01-11 PROCEDURE — 1100000000 HC RM PRIVATE

## 2024-01-11 PROCEDURE — 2580000003 HC RX 258: Performed by: INTERNAL MEDICINE

## 2024-01-11 PROCEDURE — 2580000003 HC RX 258: Performed by: STUDENT IN AN ORGANIZED HEALTH CARE EDUCATION/TRAINING PROGRAM

## 2024-01-11 PROCEDURE — 82803 BLOOD GASES ANY COMBINATION: CPT

## 2024-01-11 PROCEDURE — 6370000000 HC RX 637 (ALT 250 FOR IP)

## 2024-01-11 PROCEDURE — 36415 COLL VENOUS BLD VENIPUNCTURE: CPT

## 2024-01-11 PROCEDURE — 6360000002 HC RX W HCPCS: Performed by: STUDENT IN AN ORGANIZED HEALTH CARE EDUCATION/TRAINING PROGRAM

## 2024-01-11 PROCEDURE — 83735 ASSAY OF MAGNESIUM: CPT

## 2024-01-11 PROCEDURE — 85025 COMPLETE CBC W/AUTO DIFF WBC: CPT

## 2024-01-11 PROCEDURE — 6360000002 HC RX W HCPCS: Performed by: INTERNAL MEDICINE

## 2024-01-11 PROCEDURE — 80069 RENAL FUNCTION PANEL: CPT

## 2024-01-11 PROCEDURE — 6370000000 HC RX 637 (ALT 250 FOR IP): Performed by: STUDENT IN AN ORGANIZED HEALTH CARE EDUCATION/TRAINING PROGRAM

## 2024-01-11 PROCEDURE — 97535 SELF CARE MNGMENT TRAINING: CPT

## 2024-01-11 PROCEDURE — 97530 THERAPEUTIC ACTIVITIES: CPT

## 2024-01-11 PROCEDURE — 71045 X-RAY EXAM CHEST 1 VIEW: CPT

## 2024-01-11 RX ORDER — OXYCODONE HYDROCHLORIDE 5 MG/1
5 TABLET ORAL EVERY 4 HOURS PRN
Status: DISCONTINUED | OUTPATIENT
Start: 2024-01-11 | End: 2024-01-17 | Stop reason: HOSPADM

## 2024-01-11 RX ORDER — CARVEDILOL 12.5 MG/1
25 TABLET ORAL 2 TIMES DAILY WITH MEALS
Status: DISCONTINUED | OUTPATIENT
Start: 2024-01-11 | End: 2024-01-17 | Stop reason: HOSPADM

## 2024-01-11 RX ORDER — BACLOFEN 10 MG/1
5 TABLET ORAL EVERY 12 HOURS PRN
Status: DISCONTINUED | OUTPATIENT
Start: 2024-01-11 | End: 2024-01-17 | Stop reason: HOSPADM

## 2024-01-11 RX ORDER — HYDROMORPHONE HYDROCHLORIDE 2 MG/1
1 TABLET ORAL ONCE
Status: COMPLETED | OUTPATIENT
Start: 2024-01-11 | End: 2024-01-11

## 2024-01-11 RX ORDER — OXYCODONE HYDROCHLORIDE 5 MG/1
2.5 TABLET ORAL EVERY 4 HOURS PRN
Status: DISCONTINUED | OUTPATIENT
Start: 2024-01-11 | End: 2024-01-17 | Stop reason: HOSPADM

## 2024-01-11 RX ADMIN — WATER 1000 MG: 1 INJECTION INTRAMUSCULAR; INTRAVENOUS; SUBCUTANEOUS at 18:24

## 2024-01-11 RX ADMIN — SODIUM CHLORIDE, PRESERVATIVE FREE 10 ML: 5 INJECTION INTRAVENOUS at 09:16

## 2024-01-11 RX ADMIN — CARVEDILOL 12.5 MG: 12.5 TABLET, FILM COATED ORAL at 09:06

## 2024-01-11 RX ADMIN — ENOXAPARIN SODIUM 40 MG: 100 INJECTION SUBCUTANEOUS at 09:07

## 2024-01-11 RX ADMIN — Medication 1 CAPSULE: at 09:06

## 2024-01-11 RX ADMIN — FOLIC ACID 1 MG: 1 TABLET ORAL at 09:07

## 2024-01-11 RX ADMIN — FUROSEMIDE 40 MG: 40 TABLET ORAL at 09:16

## 2024-01-11 RX ADMIN — ACETAMINOPHEN 650 MG: 325 TABLET ORAL at 18:24

## 2024-01-11 RX ADMIN — MICONAZOLE NITRATE 1 APPLICATOR: 20 CREAM VAGINAL at 22:54

## 2024-01-11 RX ADMIN — OXYCODONE 5 MG: 5 TABLET ORAL at 22:58

## 2024-01-11 RX ADMIN — SODIUM CHLORIDE, PRESERVATIVE FREE 10 ML: 5 INJECTION INTRAVENOUS at 22:53

## 2024-01-11 RX ADMIN — Medication 1 CAPSULE: at 18:23

## 2024-01-11 RX ADMIN — BACLOFEN 10 MG: 10 TABLET ORAL at 00:54

## 2024-01-11 RX ADMIN — CHOLESTYRAMINE 4 G: 4 POWDER, FOR SUSPENSION ORAL at 09:07

## 2024-01-11 RX ADMIN — BUDESONIDE 9 MG: 3 CAPSULE, GELATIN COATED ORAL at 09:07

## 2024-01-11 RX ADMIN — CYANOCOBALAMIN TAB 500 MCG 500 MCG: 500 TAB at 09:06

## 2024-01-11 RX ADMIN — GABAPENTIN 300 MG: 300 CAPSULE ORAL at 09:07

## 2024-01-11 RX ADMIN — BACLOFEN 5 MG: 10 TABLET ORAL at 22:59

## 2024-01-11 RX ADMIN — HYDROMORPHONE HYDROCHLORIDE 1 MG: 2 TABLET ORAL at 02:29

## 2024-01-11 RX ADMIN — CARVEDILOL 25 MG: 12.5 TABLET, FILM COATED ORAL at 18:23

## 2024-01-11 RX ADMIN — ACETAMINOPHEN 650 MG: 325 TABLET ORAL at 06:43

## 2024-01-11 RX ADMIN — ACETAMINOPHEN 650 MG: 325 TABLET ORAL at 12:58

## 2024-01-11 RX ADMIN — GABAPENTIN 300 MG: 300 CAPSULE ORAL at 22:53

## 2024-01-11 RX ADMIN — ATORVASTATIN CALCIUM 80 MG: 20 TABLET, FILM COATED ORAL at 09:07

## 2024-01-11 ASSESSMENT — PAIN DESCRIPTION - LOCATION
LOCATION: LEG

## 2024-01-11 ASSESSMENT — PAIN DESCRIPTION - ORIENTATION
ORIENTATION: RIGHT
ORIENTATION: LEFT;RIGHT
ORIENTATION: RIGHT
ORIENTATION: RIGHT

## 2024-01-11 ASSESSMENT — PAIN SCALES - GENERAL
PAINLEVEL_OUTOF10: 7
PAINLEVEL_OUTOF10: 8

## 2024-01-11 ASSESSMENT — PAIN - FUNCTIONAL ASSESSMENT: PAIN_FUNCTIONAL_ASSESSMENT: PREVENTS OR INTERFERES SOME ACTIVE ACTIVITIES AND ADLS

## 2024-01-11 NOTE — CARE COORDINATION
1/11/2024  3:19 PM  Care Management Progress Note      ICD-10-CM    1. Fall, initial encounter  W19.XXXA       2. Closed fracture of left pubis, unspecified portion of pubis, initial encounter ([facility])  S32.502A       3. Acute midline low back pain, unspecified whether sciatica present  M54.50           RUR:  18%  Risk Level: []Low [x]Moderate []High    Transition of care plan:  Awaiting medical clearance and DC order. PT/OT treating.  Rehab - CM received denial from [facility], and family notified. [facility] reported they will not re-evaluate pt. CM spoke with pt's DTRs, Minh, who requested referral be submitted to [facility]. Referral submitted via [employer], and [facility]PR reported they will follow pt's progress while INP. CM provided pt's DTR, Rosalee, with SNF listing and preferences requested.   Outpatient follow-up.  Stretcher transport required at this time.

## 2024-01-12 ENCOUNTER — HOSPITAL ENCOUNTER (INPATIENT)
Facility: HOSPITAL | Age: 79
Discharge: HOME | DRG: 551 | End: 2024-01-15
Payer: MEDICARE

## 2024-01-12 ENCOUNTER — APPOINTMENT (OUTPATIENT)
Facility: HOSPITAL | Age: 79
DRG: 551 | End: 2024-01-12
Payer: MEDICARE

## 2024-01-12 LAB
ALBUMIN SERPL-MCNC: 2.3 G/DL (ref 3.5–5)
AMMONIA PLAS-SCNC: <10 UMOL/L
ANION GAP SERPL CALC-SCNC: 3 MMOL/L (ref 5–15)
BASOPHILS # BLD: 0 K/UL (ref 0–0.1)
BASOPHILS NFR BLD: 0 % (ref 0–1)
BUN SERPL-MCNC: 14 MG/DL (ref 6–20)
BUN/CREAT SERPL: 26 (ref 12–20)
CALCIUM SERPL-MCNC: 8 MG/DL (ref 8.5–10.1)
CHLORIDE SERPL-SCNC: 107 MMOL/L (ref 97–108)
CO2 SERPL-SCNC: 28 MMOL/L (ref 21–32)
CREAT SERPL-MCNC: 0.54 MG/DL (ref 0.55–1.02)
DIFFERENTIAL METHOD BLD: ABNORMAL
EOSINOPHIL # BLD: 0 K/UL (ref 0–0.4)
EOSINOPHIL NFR BLD: 0 % (ref 0–7)
ERYTHROCYTE [DISTWIDTH] IN BLOOD BY AUTOMATED COUNT: 14.6 % (ref 11.5–14.5)
GLUCOSE SERPL-MCNC: 109 MG/DL (ref 65–100)
HCT VFR BLD AUTO: 33.9 % (ref 35–47)
HGB BLD-MCNC: 10.9 G/DL (ref 11.5–16)
IMM GRANULOCYTES # BLD AUTO: 0.2 K/UL (ref 0–0.04)
IMM GRANULOCYTES NFR BLD AUTO: 2 % (ref 0–0.5)
LYMPHOCYTES # BLD: 1.8 K/UL (ref 0.8–3.5)
LYMPHOCYTES NFR BLD: 21 % (ref 12–49)
MAGNESIUM SERPL-MCNC: 1.5 MG/DL (ref 1.6–2.4)
MCH RBC QN AUTO: 32.4 PG (ref 26–34)
MCHC RBC AUTO-ENTMCNC: 32.2 G/DL (ref 30–36.5)
MCV RBC AUTO: 100.9 FL (ref 80–99)
MONOCYTES # BLD: 0.7 K/UL (ref 0–1)
MONOCYTES NFR BLD: 8 % (ref 5–13)
NEUTS SEG # BLD: 5.6 K/UL (ref 1.8–8)
NEUTS SEG NFR BLD: 69 % (ref 32–75)
NRBC # BLD: 0 K/UL (ref 0–0.01)
NRBC BLD-RTO: 0 PER 100 WBC
PHOSPHATE SERPL-MCNC: 2.8 MG/DL (ref 2.6–4.7)
PLATELET # BLD AUTO: 185 K/UL (ref 150–400)
PMV BLD AUTO: 9.4 FL (ref 8.9–12.9)
POTASSIUM SERPL-SCNC: 3.6 MMOL/L (ref 3.5–5.1)
RBC # BLD AUTO: 3.36 M/UL (ref 3.8–5.2)
SODIUM SERPL-SCNC: 138 MMOL/L (ref 136–145)
WBC # BLD AUTO: 8.2 K/UL (ref 3.6–11)

## 2024-01-12 PROCEDURE — 6360000002 HC RX W HCPCS: Performed by: STUDENT IN AN ORGANIZED HEALTH CARE EDUCATION/TRAINING PROGRAM

## 2024-01-12 PROCEDURE — 70544 MR ANGIOGRAPHY HEAD W/O DYE: CPT

## 2024-01-12 PROCEDURE — 6360000002 HC RX W HCPCS: Performed by: INTERNAL MEDICINE

## 2024-01-12 PROCEDURE — 2500000003 HC RX 250 WO HCPCS: Performed by: INTERNAL MEDICINE

## 2024-01-12 PROCEDURE — 6370000000 HC RX 637 (ALT 250 FOR IP): Performed by: STUDENT IN AN ORGANIZED HEALTH CARE EDUCATION/TRAINING PROGRAM

## 2024-01-12 PROCEDURE — 70450 CT HEAD/BRAIN W/O DYE: CPT

## 2024-01-12 PROCEDURE — 80069 RENAL FUNCTION PANEL: CPT

## 2024-01-12 PROCEDURE — 36415 COLL VENOUS BLD VENIPUNCTURE: CPT

## 2024-01-12 PROCEDURE — 2580000003 HC RX 258: Performed by: INTERNAL MEDICINE

## 2024-01-12 PROCEDURE — 2580000003 HC RX 258: Performed by: STUDENT IN AN ORGANIZED HEALTH CARE EDUCATION/TRAINING PROGRAM

## 2024-01-12 PROCEDURE — 70551 MRI BRAIN STEM W/O DYE: CPT

## 2024-01-12 PROCEDURE — 95819 EEG AWAKE AND ASLEEP: CPT

## 2024-01-12 PROCEDURE — 94761 N-INVAS EAR/PLS OXIMETRY MLT: CPT

## 2024-01-12 PROCEDURE — 70547 MR ANGIOGRAPHY NECK W/O DYE: CPT

## 2024-01-12 PROCEDURE — 83735 ASSAY OF MAGNESIUM: CPT

## 2024-01-12 PROCEDURE — 82140 ASSAY OF AMMONIA: CPT

## 2024-01-12 PROCEDURE — 2060000000 HC ICU INTERMEDIATE R&B

## 2024-01-12 PROCEDURE — 85025 COMPLETE CBC W/AUTO DIFF WBC: CPT

## 2024-01-12 PROCEDURE — 6370000000 HC RX 637 (ALT 250 FOR IP): Performed by: INTERNAL MEDICINE

## 2024-01-12 RX ORDER — SODIUM CHLORIDE 0.9 % (FLUSH) 0.9 %
5-40 SYRINGE (ML) INJECTION PRN
Status: DISCONTINUED | OUTPATIENT
Start: 2024-01-12 | End: 2024-01-17 | Stop reason: HOSPADM

## 2024-01-12 RX ORDER — ASPIRIN 300 MG/1
300 SUPPOSITORY RECTAL DAILY
Status: DISCONTINUED | OUTPATIENT
Start: 2024-01-12 | End: 2024-01-17 | Stop reason: HOSPADM

## 2024-01-12 RX ORDER — SODIUM CHLORIDE 0.9 % (FLUSH) 0.9 %
5-40 SYRINGE (ML) INJECTION EVERY 12 HOURS SCHEDULED
Status: DISCONTINUED | OUTPATIENT
Start: 2024-01-12 | End: 2024-01-17 | Stop reason: HOSPADM

## 2024-01-12 RX ORDER — ASPIRIN 81 MG/1
81 TABLET, CHEWABLE ORAL DAILY
Status: DISCONTINUED | OUTPATIENT
Start: 2024-01-12 | End: 2024-01-17 | Stop reason: HOSPADM

## 2024-01-12 RX ORDER — MAGNESIUM SULFATE HEPTAHYDRATE 40 MG/ML
2000 INJECTION, SOLUTION INTRAVENOUS ONCE
Status: COMPLETED | OUTPATIENT
Start: 2024-01-12 | End: 2024-01-12

## 2024-01-12 RX ORDER — SODIUM CHLORIDE 9 MG/ML
INJECTION, SOLUTION INTRAVENOUS PRN
Status: DISCONTINUED | OUTPATIENT
Start: 2024-01-12 | End: 2024-01-17 | Stop reason: HOSPADM

## 2024-01-12 RX ADMIN — CARVEDILOL 25 MG: 12.5 TABLET, FILM COATED ORAL at 17:40

## 2024-01-12 RX ADMIN — SODIUM CHLORIDE, PRESERVATIVE FREE 10 ML: 5 INJECTION INTRAVENOUS at 09:44

## 2024-01-12 RX ADMIN — FOLIC ACID 1 MG: 1 TABLET ORAL at 09:43

## 2024-01-12 RX ADMIN — GABAPENTIN 300 MG: 300 CAPSULE ORAL at 20:32

## 2024-01-12 RX ADMIN — Medication 1 CAPSULE: at 09:42

## 2024-01-12 RX ADMIN — CYANOCOBALAMIN TAB 500 MCG 500 MCG: 500 TAB at 09:43

## 2024-01-12 RX ADMIN — SODIUM CHLORIDE, PRESERVATIVE FREE 10 ML: 5 INJECTION INTRAVENOUS at 20:33

## 2024-01-12 RX ADMIN — ASPIRIN 81 MG: 81 TABLET, CHEWABLE ORAL at 17:40

## 2024-01-12 RX ADMIN — HYDRALAZINE HYDROCHLORIDE 10 MG: 20 INJECTION INTRAMUSCULAR; INTRAVENOUS at 03:47

## 2024-01-12 RX ADMIN — ACETAMINOPHEN 650 MG: 325 TABLET ORAL at 19:05

## 2024-01-12 RX ADMIN — Medication 1 CAPSULE: at 17:40

## 2024-01-12 RX ADMIN — ACETAMINOPHEN 650 MG: 325 TABLET ORAL at 17:40

## 2024-01-12 RX ADMIN — GABAPENTIN 300 MG: 300 CAPSULE ORAL at 09:42

## 2024-01-12 RX ADMIN — ATORVASTATIN CALCIUM 80 MG: 20 TABLET, FILM COATED ORAL at 09:42

## 2024-01-12 RX ADMIN — CHOLESTYRAMINE 4 G: 4 POWDER, FOR SUSPENSION ORAL at 09:43

## 2024-01-12 RX ADMIN — HYDRALAZINE HYDROCHLORIDE 10 MG: 20 INJECTION INTRAMUSCULAR; INTRAVENOUS at 03:08

## 2024-01-12 RX ADMIN — MAGNESIUM SULFATE HEPTAHYDRATE 2000 MG: 40 INJECTION, SOLUTION INTRAVENOUS at 09:58

## 2024-01-12 RX ADMIN — WATER 1000 MG: 1 INJECTION INTRAMUSCULAR; INTRAVENOUS; SUBCUTANEOUS at 19:06

## 2024-01-12 RX ADMIN — ACETAMINOPHEN 650 MG: 325 TABLET ORAL at 09:53

## 2024-01-12 RX ADMIN — ENOXAPARIN SODIUM 40 MG: 100 INJECTION SUBCUTANEOUS at 09:44

## 2024-01-12 RX ADMIN — CARVEDILOL 25 MG: 12.5 TABLET, FILM COATED ORAL at 09:43

## 2024-01-12 RX ADMIN — BUDESONIDE 9 MG: 3 CAPSULE, GELATIN COATED ORAL at 09:43

## 2024-01-12 RX ADMIN — SODIUM CHLORIDE: 9 INJECTION, SOLUTION INTRAVENOUS at 12:14

## 2024-01-12 NOTE — CARE COORDINATION
1/12/2024  11:42 AM  Care Management Progress Note      ICD-10-CM    1. Fall, initial encounter  W19.XXXA       2. Closed fracture of left pubis, unspecified portion of pubis, initial encounter ([facility])  S32.502A       3. Acute midline low back pain, unspecified whether sciatica present  M54.50           RUR:  18%  Risk Level: []Low [x]Moderate []High    Transition of care plan:  Awaiting medical clearance and DC order. PT/OT treating. Workup for encephalopathy/word loss.   Rehab/SNF - [facility] IPR following for pt progression prior to providing acceptance. SNF preferences requested in the event [facility] is not able to accept.   Outpatient follow-up.  Stretcher transport required at this time.

## 2024-01-13 PROBLEM — R41.0 CONFUSION: Status: ACTIVE | Noted: 2024-01-13

## 2024-01-13 PROBLEM — R41.0 DELIRIUM: Status: ACTIVE | Noted: 2024-01-13

## 2024-01-13 LAB
ALBUMIN SERPL-MCNC: 2.3 G/DL (ref 3.5–5)
ANION GAP SERPL CALC-SCNC: 6 MMOL/L (ref 5–15)
BASOPHILS # BLD: 0 K/UL (ref 0–0.1)
BASOPHILS NFR BLD: 0 % (ref 0–1)
BUN SERPL-MCNC: 15 MG/DL (ref 6–20)
BUN/CREAT SERPL: 25 (ref 12–20)
CALCIUM SERPL-MCNC: 8.1 MG/DL (ref 8.5–10.1)
CHLORIDE SERPL-SCNC: 106 MMOL/L (ref 97–108)
CHOLEST SERPL-MCNC: 154 MG/DL
CO2 SERPL-SCNC: 28 MMOL/L (ref 21–32)
CREAT SERPL-MCNC: 0.6 MG/DL (ref 0.55–1.02)
DIFFERENTIAL METHOD BLD: ABNORMAL
EOSINOPHIL # BLD: 0.1 K/UL (ref 0–0.4)
EOSINOPHIL NFR BLD: 1 % (ref 0–7)
ERYTHROCYTE [DISTWIDTH] IN BLOOD BY AUTOMATED COUNT: 15 % (ref 11.5–14.5)
EST. AVERAGE GLUCOSE BLD GHB EST-MCNC: 103 MG/DL
GLUCOSE SERPL-MCNC: 115 MG/DL (ref 65–100)
HBA1C MFR BLD: 5.2 % (ref 4–5.6)
HCT VFR BLD AUTO: 33.4 % (ref 35–47)
HDLC SERPL-MCNC: 36 MG/DL
HDLC SERPL: 4.3 (ref 0–5)
HGB BLD-MCNC: 11 G/DL (ref 11.5–16)
IMM GRANULOCYTES # BLD AUTO: 0.2 K/UL (ref 0–0.04)
IMM GRANULOCYTES NFR BLD AUTO: 2 % (ref 0–0.5)
LDLC SERPL CALC-MCNC: 81.2 MG/DL (ref 0–100)
LYMPHOCYTES # BLD: 2.2 K/UL (ref 0.8–3.5)
LYMPHOCYTES NFR BLD: 26 % (ref 12–49)
MAGNESIUM SERPL-MCNC: 2 MG/DL (ref 1.6–2.4)
MCH RBC QN AUTO: 33 PG (ref 26–34)
MCHC RBC AUTO-ENTMCNC: 32.9 G/DL (ref 30–36.5)
MCV RBC AUTO: 100.3 FL (ref 80–99)
MONOCYTES # BLD: 0.7 K/UL (ref 0–1)
MONOCYTES NFR BLD: 8 % (ref 5–13)
NEUTS SEG # BLD: 5.4 K/UL (ref 1.8–8)
NEUTS SEG NFR BLD: 63 % (ref 32–75)
NRBC # BLD: 0 K/UL (ref 0–0.01)
NRBC BLD-RTO: 0 PER 100 WBC
PHOSPHATE SERPL-MCNC: 3.3 MG/DL (ref 2.6–4.7)
PLATELET # BLD AUTO: 189 K/UL (ref 150–400)
PMV BLD AUTO: 9.1 FL (ref 8.9–12.9)
POTASSIUM SERPL-SCNC: 3.4 MMOL/L (ref 3.5–5.1)
RBC # BLD AUTO: 3.33 M/UL (ref 3.8–5.2)
SODIUM SERPL-SCNC: 140 MMOL/L (ref 136–145)
TRIGL SERPL-MCNC: 184 MG/DL
VLDLC SERPL CALC-MCNC: 36.8 MG/DL
WBC # BLD AUTO: 8.5 K/UL (ref 3.6–11)

## 2024-01-13 PROCEDURE — 95816 EEG AWAKE AND DROWSY: CPT | Performed by: PSYCHIATRY & NEUROLOGY

## 2024-01-13 PROCEDURE — 80069 RENAL FUNCTION PANEL: CPT

## 2024-01-13 PROCEDURE — 99223 1ST HOSP IP/OBS HIGH 75: CPT | Performed by: PSYCHIATRY & NEUROLOGY

## 2024-01-13 PROCEDURE — 6370000000 HC RX 637 (ALT 250 FOR IP): Performed by: NURSE PRACTITIONER

## 2024-01-13 PROCEDURE — 83036 HEMOGLOBIN GLYCOSYLATED A1C: CPT

## 2024-01-13 PROCEDURE — 94761 N-INVAS EAR/PLS OXIMETRY MLT: CPT

## 2024-01-13 PROCEDURE — 92610 EVALUATE SWALLOWING FUNCTION: CPT

## 2024-01-13 PROCEDURE — 6360000002 HC RX W HCPCS: Performed by: STUDENT IN AN ORGANIZED HEALTH CARE EDUCATION/TRAINING PROGRAM

## 2024-01-13 PROCEDURE — 80061 LIPID PANEL: CPT

## 2024-01-13 PROCEDURE — 85025 COMPLETE CBC W/AUTO DIFF WBC: CPT

## 2024-01-13 PROCEDURE — 36415 COLL VENOUS BLD VENIPUNCTURE: CPT

## 2024-01-13 PROCEDURE — 6370000000 HC RX 637 (ALT 250 FOR IP): Performed by: STUDENT IN AN ORGANIZED HEALTH CARE EDUCATION/TRAINING PROGRAM

## 2024-01-13 PROCEDURE — 2060000000 HC ICU INTERMEDIATE R&B

## 2024-01-13 PROCEDURE — 97530 THERAPEUTIC ACTIVITIES: CPT

## 2024-01-13 PROCEDURE — 6370000000 HC RX 637 (ALT 250 FOR IP): Performed by: INTERNAL MEDICINE

## 2024-01-13 PROCEDURE — 2580000003 HC RX 258: Performed by: INTERNAL MEDICINE

## 2024-01-13 PROCEDURE — 83735 ASSAY OF MAGNESIUM: CPT

## 2024-01-13 RX ORDER — GABAPENTIN 300 MG/1
600 CAPSULE ORAL NIGHTLY
Status: DISCONTINUED | OUTPATIENT
Start: 2024-01-13 | End: 2024-01-17 | Stop reason: HOSPADM

## 2024-01-13 RX ORDER — GABAPENTIN 300 MG/1
300 CAPSULE ORAL DAILY
Status: DISCONTINUED | OUTPATIENT
Start: 2024-01-14 | End: 2024-01-17 | Stop reason: HOSPADM

## 2024-01-13 RX ORDER — LISINOPRIL 5 MG/1
5 TABLET ORAL DAILY
Status: DISCONTINUED | OUTPATIENT
Start: 2024-01-13 | End: 2024-01-17 | Stop reason: HOSPADM

## 2024-01-13 RX ADMIN — ACETAMINOPHEN 650 MG: 325 TABLET ORAL at 01:40

## 2024-01-13 RX ADMIN — Medication 1 CAPSULE: at 17:18

## 2024-01-13 RX ADMIN — CARVEDILOL 25 MG: 12.5 TABLET, FILM COATED ORAL at 09:11

## 2024-01-13 RX ADMIN — GABAPENTIN 600 MG: 300 CAPSULE ORAL at 21:10

## 2024-01-13 RX ADMIN — ATORVASTATIN CALCIUM 80 MG: 20 TABLET, FILM COATED ORAL at 09:10

## 2024-01-13 RX ADMIN — GABAPENTIN 300 MG: 300 CAPSULE ORAL at 09:10

## 2024-01-13 RX ADMIN — FOLIC ACID 1 MG: 1 TABLET ORAL at 09:11

## 2024-01-13 RX ADMIN — ASPIRIN 81 MG: 81 TABLET, CHEWABLE ORAL at 09:11

## 2024-01-13 RX ADMIN — ACETAMINOPHEN 650 MG: 325 TABLET ORAL at 18:59

## 2024-01-13 RX ADMIN — CYANOCOBALAMIN TAB 500 MCG 500 MCG: 500 TAB at 09:11

## 2024-01-13 RX ADMIN — CHOLESTYRAMINE 4 G: 4 POWDER, FOR SUSPENSION ORAL at 09:09

## 2024-01-13 RX ADMIN — FUROSEMIDE 40 MG: 40 TABLET ORAL at 09:10

## 2024-01-13 RX ADMIN — ACETAMINOPHEN 650 MG: 325 TABLET ORAL at 06:50

## 2024-01-13 RX ADMIN — Medication 1 CAPSULE: at 09:11

## 2024-01-13 RX ADMIN — ENOXAPARIN SODIUM 40 MG: 100 INJECTION SUBCUTANEOUS at 09:10

## 2024-01-13 RX ADMIN — BUDESONIDE 9 MG: 3 CAPSULE, GELATIN COATED ORAL at 10:10

## 2024-01-13 RX ADMIN — ACETAMINOPHEN 650 MG: 325 TABLET ORAL at 13:07

## 2024-01-13 RX ADMIN — SODIUM CHLORIDE, PRESERVATIVE FREE 10 ML: 5 INJECTION INTRAVENOUS at 21:10

## 2024-01-13 NOTE — PROCEDURES
Name:   Sujata Ernandez  Age/ Sex:   78 y.o. female     Procedure:   EEG     Date of Recordin2024    Date of Interpretation:    24    Interpreting Physician: Paul Elias MD     Indication:    1. Fall, initial encounter    2. Closed fracture of left pubis, unspecified portion of pubis, initial encounter ([facility])    3. Acute midline low back pain, unspecified whether sciatica present      Technical: Digital EEG recorded in 10-20 international placement system, multiple montages    Interpretation:  Patient is awake at the beginning of this recording. The background pattern is symmetric, medium in amplitude with the PDR being 6 Hz, symmetric.  Photic stimulation does not result in a photic driving response on either side.  Hyperventilation was not performed.  Drowsiness recorded: no.  Sleep recorded: no.  Single lead EKG was normal.  There were no epileptiform discharges, focal areas of slowing, or electrographic seizures during this recording.       Impression:  Mild generalized slowing during wakefulness suggestive of an encephalopathy.  No seizure discharges seen. Clinical correlation is necessary.          Current Facility-Administered Medications:     lisinopril (PRINIVIL;ZESTRIL) tablet 5 mg, 5 mg, Oral, Daily, Lillie Crowder MD    sodium chloride flush 0.9 % injection 5-40 mL, 5-40 mL, IntraVENous, 2 times per day, Lillie Crowder MD    sodium chloride flush 0.9 % injection 5-40 mL, 5-40 mL, IntraVENous, PRN, Lillie Crowder MD    0.9 % sodium chloride infusion, , IntraVENous, PRN, Lillie Crowder MD    aspirin chewable tablet 81 mg, 81 mg, Oral, Daily, 81 mg at 24 0911 **OR** aspirin suppository 300 mg, 300 mg, Rectal, Daily, Lillie Crowder MD    oxyCODONE (ROXICODONE) immediate release tablet 2.5 mg, 2.5 mg, Oral, Q4H PRN **OR** oxyCODONE (ROXICODONE) immediate release tablet 5 mg, 5 mg, Oral, Q4H PRN, Lillie Crowder MD, 5 mg at

## 2024-01-14 LAB
ALBUMIN SERPL-MCNC: 2.2 G/DL (ref 3.5–5)
ANION GAP SERPL CALC-SCNC: 4 MMOL/L (ref 5–15)
BASOPHILS # BLD: 0 K/UL (ref 0–0.1)
BASOPHILS NFR BLD: 0 % (ref 0–1)
BUN SERPL-MCNC: 13 MG/DL (ref 6–20)
BUN/CREAT SERPL: 30 (ref 12–20)
CALCIUM SERPL-MCNC: 7.8 MG/DL (ref 8.5–10.1)
CHLORIDE SERPL-SCNC: 110 MMOL/L (ref 97–108)
CO2 SERPL-SCNC: 25 MMOL/L (ref 21–32)
CREAT SERPL-MCNC: 0.43 MG/DL (ref 0.55–1.02)
DIFFERENTIAL METHOD BLD: ABNORMAL
EOSINOPHIL # BLD: 0.1 K/UL (ref 0–0.4)
EOSINOPHIL NFR BLD: 1 % (ref 0–7)
ERYTHROCYTE [DISTWIDTH] IN BLOOD BY AUTOMATED COUNT: 14.9 % (ref 11.5–14.5)
GLUCOSE SERPL-MCNC: 90 MG/DL (ref 65–100)
HCT VFR BLD AUTO: 30.7 % (ref 35–47)
HGB BLD-MCNC: 9.9 G/DL (ref 11.5–16)
IMM GRANULOCYTES # BLD AUTO: 0.1 K/UL (ref 0–0.04)
IMM GRANULOCYTES NFR BLD AUTO: 2 % (ref 0–0.5)
LYMPHOCYTES # BLD: 1.8 K/UL (ref 0.8–3.5)
LYMPHOCYTES NFR BLD: 24 % (ref 12–49)
MAGNESIUM SERPL-MCNC: 1.7 MG/DL (ref 1.6–2.4)
MCH RBC QN AUTO: 32.9 PG (ref 26–34)
MCHC RBC AUTO-ENTMCNC: 32.2 G/DL (ref 30–36.5)
MCV RBC AUTO: 102 FL (ref 80–99)
MONOCYTES # BLD: 0.6 K/UL (ref 0–1)
MONOCYTES NFR BLD: 8 % (ref 5–13)
NEUTS SEG # BLD: 4.8 K/UL (ref 1.8–8)
NEUTS SEG NFR BLD: 65 % (ref 32–75)
NRBC # BLD: 0 K/UL (ref 0–0.01)
NRBC BLD-RTO: 0 PER 100 WBC
PHOSPHATE SERPL-MCNC: 3.2 MG/DL (ref 2.6–4.7)
PLATELET # BLD AUTO: 172 K/UL (ref 150–400)
PMV BLD AUTO: 9.8 FL (ref 8.9–12.9)
POTASSIUM SERPL-SCNC: 3.4 MMOL/L (ref 3.5–5.1)
RBC # BLD AUTO: 3.01 M/UL (ref 3.8–5.2)
RBC MORPH BLD: ABNORMAL
SODIUM SERPL-SCNC: 139 MMOL/L (ref 136–145)
WBC # BLD AUTO: 7.4 K/UL (ref 3.6–11)

## 2024-01-14 PROCEDURE — 6370000000 HC RX 637 (ALT 250 FOR IP): Performed by: STUDENT IN AN ORGANIZED HEALTH CARE EDUCATION/TRAINING PROGRAM

## 2024-01-14 PROCEDURE — 6360000002 HC RX W HCPCS: Performed by: STUDENT IN AN ORGANIZED HEALTH CARE EDUCATION/TRAINING PROGRAM

## 2024-01-14 PROCEDURE — 2060000000 HC ICU INTERMEDIATE R&B

## 2024-01-14 PROCEDURE — 85025 COMPLETE CBC W/AUTO DIFF WBC: CPT

## 2024-01-14 PROCEDURE — 36415 COLL VENOUS BLD VENIPUNCTURE: CPT

## 2024-01-14 PROCEDURE — 6370000000 HC RX 637 (ALT 250 FOR IP): Performed by: INTERNAL MEDICINE

## 2024-01-14 PROCEDURE — 80069 RENAL FUNCTION PANEL: CPT

## 2024-01-14 PROCEDURE — 6370000000 HC RX 637 (ALT 250 FOR IP): Performed by: NURSE PRACTITIONER

## 2024-01-14 PROCEDURE — 83735 ASSAY OF MAGNESIUM: CPT

## 2024-01-14 PROCEDURE — 94761 N-INVAS EAR/PLS OXIMETRY MLT: CPT

## 2024-01-14 RX ADMIN — CYANOCOBALAMIN TAB 500 MCG 500 MCG: 500 TAB at 09:33

## 2024-01-14 RX ADMIN — BUDESONIDE 9 MG: 3 CAPSULE, GELATIN COATED ORAL at 09:55

## 2024-01-14 RX ADMIN — ACETAMINOPHEN 650 MG: 325 TABLET ORAL at 09:55

## 2024-01-14 RX ADMIN — GABAPENTIN 300 MG: 300 CAPSULE ORAL at 09:33

## 2024-01-14 RX ADMIN — FOLIC ACID 1 MG: 1 TABLET ORAL at 09:33

## 2024-01-14 RX ADMIN — ASPIRIN 81 MG: 81 TABLET, CHEWABLE ORAL at 09:33

## 2024-01-14 RX ADMIN — LISINOPRIL 5 MG: 5 TABLET ORAL at 09:34

## 2024-01-14 RX ADMIN — ACETAMINOPHEN 650 MG: 325 TABLET ORAL at 01:43

## 2024-01-14 RX ADMIN — ENOXAPARIN SODIUM 40 MG: 100 INJECTION SUBCUTANEOUS at 09:33

## 2024-01-14 RX ADMIN — CHOLESTYRAMINE 4 G: 4 POWDER, FOR SUSPENSION ORAL at 09:33

## 2024-01-14 RX ADMIN — ACETAMINOPHEN 650 MG: 325 TABLET ORAL at 15:09

## 2024-01-14 RX ADMIN — Medication 1 CAPSULE: at 09:34

## 2024-01-14 RX ADMIN — ACETAMINOPHEN 650 MG: 325 TABLET ORAL at 20:48

## 2024-01-14 RX ADMIN — Medication 1 CAPSULE: at 18:22

## 2024-01-14 RX ADMIN — CARVEDILOL 25 MG: 12.5 TABLET, FILM COATED ORAL at 18:22

## 2024-01-14 RX ADMIN — CARVEDILOL 25 MG: 12.5 TABLET, FILM COATED ORAL at 09:33

## 2024-01-14 RX ADMIN — ATORVASTATIN CALCIUM 80 MG: 20 TABLET, FILM COATED ORAL at 09:33

## 2024-01-14 RX ADMIN — GABAPENTIN 600 MG: 300 CAPSULE ORAL at 20:48

## 2024-01-14 ASSESSMENT — PAIN SCALES - GENERAL: PAINLEVEL_OUTOF10: 0

## 2024-01-14 NOTE — CARE COORDINATION
01/14/24 0939   Condition of Participation: Discharge Planning   The Patient and/or Patient Representative was provided with a Choice of Provider? Patient Representative   Name of the Patient Representative who was provided with the Choice of Provider and agrees with the Discharge Plan?  cherie Smith   The Patient and/Or Patient Representative agree with the Discharge Plan? Yes   Freedom of Choice list was provided with basic dialogue that supports the patient's individualized plan of care/goals, treatment preferences, and shares the quality data associated with the providers?  Yes     Care Management Progress Note:   CM met with patient and son Luis Serrano  [phone] @ bedside. Discussed therapy recs (currently [facility] has declined, [facility] accepted but needs updated therapy notes) for SNF. Luis indicated [facility] is still first choice, if it has to be SNF family would like 1. [facility]. [facility] 3. [facility]. CM sent referrals per family choice. CM following for needs.    ______________________  Ghada FLORES, RN  Care Management  1/14/2024  9:45 AM

## 2024-01-15 LAB
ALBUMIN SERPL-MCNC: 2.2 G/DL (ref 3.5–5)
ANION GAP SERPL CALC-SCNC: 3 MMOL/L (ref 5–15)
BASOPHILS # BLD: 0 K/UL (ref 0–0.1)
BASOPHILS NFR BLD: 0 % (ref 0–1)
BUN SERPL-MCNC: 14 MG/DL (ref 6–20)
BUN/CREAT SERPL: 27 (ref 12–20)
CALCIUM SERPL-MCNC: 7.8 MG/DL (ref 8.5–10.1)
CHLORIDE SERPL-SCNC: 111 MMOL/L (ref 97–108)
CO2 SERPL-SCNC: 25 MMOL/L (ref 21–32)
CREAT SERPL-MCNC: 0.51 MG/DL (ref 0.55–1.02)
DIFFERENTIAL METHOD BLD: ABNORMAL
EOSINOPHIL # BLD: 0 K/UL (ref 0–0.4)
EOSINOPHIL NFR BLD: 0 % (ref 0–7)
ERYTHROCYTE [DISTWIDTH] IN BLOOD BY AUTOMATED COUNT: 14.6 % (ref 11.5–14.5)
GLUCOSE SERPL-MCNC: 115 MG/DL (ref 65–100)
HCT VFR BLD AUTO: 30.4 % (ref 35–47)
HGB BLD-MCNC: 9.8 G/DL (ref 11.5–16)
IMM GRANULOCYTES # BLD AUTO: 0.1 K/UL (ref 0–0.04)
IMM GRANULOCYTES NFR BLD AUTO: 2 % (ref 0–0.5)
LYMPHOCYTES # BLD: 1.3 K/UL (ref 0.8–3.5)
LYMPHOCYTES NFR BLD: 19 % (ref 12–49)
MAGNESIUM SERPL-MCNC: 1.5 MG/DL (ref 1.6–2.4)
MCH RBC QN AUTO: 32.8 PG (ref 26–34)
MCHC RBC AUTO-ENTMCNC: 32.2 G/DL (ref 30–36.5)
MCV RBC AUTO: 101.7 FL (ref 80–99)
MONOCYTES # BLD: 0.6 K/UL (ref 0–1)
MONOCYTES NFR BLD: 9 % (ref 5–13)
NEUTS SEG # BLD: 5 K/UL (ref 1.8–8)
NEUTS SEG NFR BLD: 70 % (ref 32–75)
NRBC # BLD: 0 K/UL (ref 0–0.01)
NRBC BLD-RTO: 0 PER 100 WBC
PHOSPHATE SERPL-MCNC: 3.2 MG/DL (ref 2.6–4.7)
PLATELET # BLD AUTO: 154 K/UL (ref 150–400)
PMV BLD AUTO: 9.3 FL (ref 8.9–12.9)
POTASSIUM SERPL-SCNC: 3.5 MMOL/L (ref 3.5–5.1)
RBC # BLD AUTO: 2.99 M/UL (ref 3.8–5.2)
RBC MORPH BLD: ABNORMAL
SODIUM SERPL-SCNC: 139 MMOL/L (ref 136–145)
WBC # BLD AUTO: 7 K/UL (ref 3.6–11)

## 2024-01-15 PROCEDURE — 36415 COLL VENOUS BLD VENIPUNCTURE: CPT

## 2024-01-15 PROCEDURE — 2580000003 HC RX 258: Performed by: STUDENT IN AN ORGANIZED HEALTH CARE EDUCATION/TRAINING PROGRAM

## 2024-01-15 PROCEDURE — 6370000000 HC RX 637 (ALT 250 FOR IP): Performed by: NURSE PRACTITIONER

## 2024-01-15 PROCEDURE — 6370000000 HC RX 637 (ALT 250 FOR IP): Performed by: INTERNAL MEDICINE

## 2024-01-15 PROCEDURE — 1100000000 HC RM PRIVATE

## 2024-01-15 PROCEDURE — 83735 ASSAY OF MAGNESIUM: CPT

## 2024-01-15 PROCEDURE — 85025 COMPLETE CBC W/AUTO DIFF WBC: CPT

## 2024-01-15 PROCEDURE — 2580000003 HC RX 258: Performed by: INTERNAL MEDICINE

## 2024-01-15 PROCEDURE — 97530 THERAPEUTIC ACTIVITIES: CPT

## 2024-01-15 PROCEDURE — 6370000000 HC RX 637 (ALT 250 FOR IP): Performed by: STUDENT IN AN ORGANIZED HEALTH CARE EDUCATION/TRAINING PROGRAM

## 2024-01-15 PROCEDURE — 94761 N-INVAS EAR/PLS OXIMETRY MLT: CPT

## 2024-01-15 PROCEDURE — 6360000002 HC RX W HCPCS: Performed by: STUDENT IN AN ORGANIZED HEALTH CARE EDUCATION/TRAINING PROGRAM

## 2024-01-15 PROCEDURE — 80069 RENAL FUNCTION PANEL: CPT

## 2024-01-15 RX ADMIN — ENOXAPARIN SODIUM 40 MG: 100 INJECTION SUBCUTANEOUS at 09:16

## 2024-01-15 RX ADMIN — FUROSEMIDE 40 MG: 40 TABLET ORAL at 09:48

## 2024-01-15 RX ADMIN — FOLIC ACID 1 MG: 1 TABLET ORAL at 09:15

## 2024-01-15 RX ADMIN — GABAPENTIN 300 MG: 300 CAPSULE ORAL at 09:15

## 2024-01-15 RX ADMIN — ACETAMINOPHEN 650 MG: 325 TABLET ORAL at 18:19

## 2024-01-15 RX ADMIN — GABAPENTIN 600 MG: 300 CAPSULE ORAL at 20:27

## 2024-01-15 RX ADMIN — ACETAMINOPHEN 650 MG: 325 TABLET ORAL at 06:52

## 2024-01-15 RX ADMIN — CARVEDILOL 25 MG: 12.5 TABLET, FILM COATED ORAL at 09:15

## 2024-01-15 RX ADMIN — CYANOCOBALAMIN TAB 500 MCG 500 MCG: 500 TAB at 09:15

## 2024-01-15 RX ADMIN — BUDESONIDE 9 MG: 3 CAPSULE, GELATIN COATED ORAL at 09:48

## 2024-01-15 RX ADMIN — LISINOPRIL 5 MG: 5 TABLET ORAL at 09:15

## 2024-01-15 RX ADMIN — Medication 1 CAPSULE: at 18:19

## 2024-01-15 RX ADMIN — Medication 1 CAPSULE: at 09:15

## 2024-01-15 RX ADMIN — SODIUM CHLORIDE, PRESERVATIVE FREE 10 ML: 5 INJECTION INTRAVENOUS at 20:30

## 2024-01-15 RX ADMIN — ATORVASTATIN CALCIUM 80 MG: 20 TABLET, FILM COATED ORAL at 09:15

## 2024-01-15 RX ADMIN — CHOLESTYRAMINE 4 G: 4 POWDER, FOR SUSPENSION ORAL at 09:15

## 2024-01-15 RX ADMIN — SODIUM CHLORIDE, PRESERVATIVE FREE 10 ML: 5 INJECTION INTRAVENOUS at 20:31

## 2024-01-15 RX ADMIN — ASPIRIN 81 MG: 81 TABLET, CHEWABLE ORAL at 09:15

## 2024-01-15 RX ADMIN — CARVEDILOL 25 MG: 12.5 TABLET, FILM COATED ORAL at 18:19

## 2024-01-15 RX ADMIN — SODIUM CHLORIDE, PRESERVATIVE FREE 10 ML: 5 INJECTION INTRAVENOUS at 09:17

## 2024-01-15 RX ADMIN — ACETAMINOPHEN 650 MG: 325 TABLET ORAL at 13:28

## 2024-01-15 RX ADMIN — ACETAMINOPHEN 650 MG: 325 TABLET ORAL at 00:26

## 2024-01-15 ASSESSMENT — PAIN DESCRIPTION - FREQUENCY: FREQUENCY: INTERMITTENT

## 2024-01-15 ASSESSMENT — PAIN SCALES - GENERAL
PAINLEVEL_OUTOF10: 0
PAINLEVEL_OUTOF10: 3
PAINLEVEL_OUTOF10: 0
PAINLEVEL_OUTOF10: 0

## 2024-01-15 ASSESSMENT — PAIN DESCRIPTION - LOCATION: LOCATION: PELVIS

## 2024-01-15 ASSESSMENT — PAIN DESCRIPTION - ONSET: ONSET: AWAKENED FROM SLEEP

## 2024-01-15 NOTE — CARE COORDINATION
4:19 PM  Call received from Rosalee, agreeing for [facility] to call her.  She plans to call them as well.  Family prefers [facility] if they will accept patient, and she is aware at this time that there is covid in the building at [facility] and that they are continuing to assess if they can accept her.      2:47 PM  Call placed to daughter, Rosalee, to let her know that [facility] has accepted and that [facility] is showing an interest.  She is communicating with her siblings and will call this CM back regarding permission for the facilities to call her.     11:45AM  Call received from [facility] rehab that SNF is recommended.  Family aware and asking for [facility] to be added to the referral list and remove the [facility].   8:33 AM  Updated therapy notes and physician notes sent to [facility] per their request.  Family prefers them if able to accept.  The SNF facilities are still reviewing the referrals sent over the weekend in the event IRF cannot accept.     Transition of Care  RUR 18%  1- SNF referrals pending- added [facility].  2- family to let CM know if they would like [facility] who has accepted.   3- CM following to coordinate and to communicate  4- transportation TBD

## 2024-01-16 LAB
ANION GAP SERPL CALC-SCNC: 3 MMOL/L (ref 5–15)
BUN SERPL-MCNC: 14 MG/DL (ref 6–20)
BUN/CREAT SERPL: 29 (ref 12–20)
CALCIUM SERPL-MCNC: 7.8 MG/DL (ref 8.5–10.1)
CHLORIDE SERPL-SCNC: 113 MMOL/L (ref 97–108)
CO2 SERPL-SCNC: 24 MMOL/L (ref 21–32)
CREAT SERPL-MCNC: 0.49 MG/DL (ref 0.55–1.02)
ERYTHROCYTE [DISTWIDTH] IN BLOOD BY AUTOMATED COUNT: 14.7 % (ref 11.5–14.5)
GLUCOSE SERPL-MCNC: 123 MG/DL (ref 65–100)
HCT VFR BLD AUTO: 37.9 % (ref 35–47)
HGB BLD-MCNC: 11.3 G/DL (ref 11.5–16)
MAGNESIUM SERPL-MCNC: 1.5 MG/DL (ref 1.6–2.4)
MCH RBC QN AUTO: 32.9 PG (ref 26–34)
MCHC RBC AUTO-ENTMCNC: 29.8 G/DL (ref 30–36.5)
MCV RBC AUTO: 110.5 FL (ref 80–99)
NRBC # BLD: 0 K/UL (ref 0–0.01)
NRBC BLD-RTO: 0 PER 100 WBC
PLATELET # BLD AUTO: 121 K/UL (ref 150–400)
PMV BLD AUTO: 9.6 FL (ref 8.9–12.9)
POTASSIUM SERPL-SCNC: 3.9 MMOL/L (ref 3.5–5.1)
RBC # BLD AUTO: 3.43 M/UL (ref 3.8–5.2)
SODIUM SERPL-SCNC: 140 MMOL/L (ref 136–145)
WBC # BLD AUTO: 6.7 K/UL (ref 3.6–11)

## 2024-01-16 PROCEDURE — 94761 N-INVAS EAR/PLS OXIMETRY MLT: CPT

## 2024-01-16 PROCEDURE — 6360000002 HC RX W HCPCS: Performed by: STUDENT IN AN ORGANIZED HEALTH CARE EDUCATION/TRAINING PROGRAM

## 2024-01-16 PROCEDURE — 36415 COLL VENOUS BLD VENIPUNCTURE: CPT

## 2024-01-16 PROCEDURE — 2580000003 HC RX 258: Performed by: STUDENT IN AN ORGANIZED HEALTH CARE EDUCATION/TRAINING PROGRAM

## 2024-01-16 PROCEDURE — 6370000000 HC RX 637 (ALT 250 FOR IP): Performed by: INTERNAL MEDICINE

## 2024-01-16 PROCEDURE — 85027 COMPLETE CBC AUTOMATED: CPT

## 2024-01-16 PROCEDURE — 6370000000 HC RX 637 (ALT 250 FOR IP): Performed by: STUDENT IN AN ORGANIZED HEALTH CARE EDUCATION/TRAINING PROGRAM

## 2024-01-16 PROCEDURE — 97110 THERAPEUTIC EXERCISES: CPT | Performed by: OCCUPATIONAL THERAPIST

## 2024-01-16 PROCEDURE — 2500000003 HC RX 250 WO HCPCS: Performed by: HOSPITALIST

## 2024-01-16 PROCEDURE — 80048 BASIC METABOLIC PNL TOTAL CA: CPT

## 2024-01-16 PROCEDURE — 1100000000 HC RM PRIVATE

## 2024-01-16 PROCEDURE — 6370000000 HC RX 637 (ALT 250 FOR IP): Performed by: NURSE PRACTITIONER

## 2024-01-16 PROCEDURE — 2580000003 HC RX 258: Performed by: INTERNAL MEDICINE

## 2024-01-16 PROCEDURE — 83735 ASSAY OF MAGNESIUM: CPT

## 2024-01-16 RX ORDER — MAGNESIUM SULFATE HEPTAHYDRATE 40 MG/ML
2000 INJECTION, SOLUTION INTRAVENOUS ONCE
Status: COMPLETED | OUTPATIENT
Start: 2024-01-16 | End: 2024-01-16

## 2024-01-16 RX ADMIN — ACETAMINOPHEN 650 MG: 325 TABLET ORAL at 08:47

## 2024-01-16 RX ADMIN — ACETAMINOPHEN 650 MG: 325 TABLET ORAL at 12:18

## 2024-01-16 RX ADMIN — ACETAMINOPHEN 650 MG: 325 TABLET ORAL at 18:13

## 2024-01-16 RX ADMIN — CYANOCOBALAMIN TAB 500 MCG 500 MCG: 500 TAB at 08:47

## 2024-01-16 RX ADMIN — SODIUM CHLORIDE, PRESERVATIVE FREE 10 ML: 5 INJECTION INTRAVENOUS at 20:29

## 2024-01-16 RX ADMIN — ACETAMINOPHEN 650 MG: 325 TABLET ORAL at 01:54

## 2024-01-16 RX ADMIN — MAGNESIUM SULFATE HEPTAHYDRATE 2000 MG: 40 INJECTION, SOLUTION INTRAVENOUS at 12:18

## 2024-01-16 RX ADMIN — SODIUM CHLORIDE, PRESERVATIVE FREE 10 ML: 5 INJECTION INTRAVENOUS at 20:28

## 2024-01-16 RX ADMIN — OXYCODONE 5 MG: 5 TABLET ORAL at 20:49

## 2024-01-16 RX ADMIN — SODIUM CHLORIDE, PRESERVATIVE FREE 10 ML: 5 INJECTION INTRAVENOUS at 08:48

## 2024-01-16 RX ADMIN — LISINOPRIL 5 MG: 5 TABLET ORAL at 08:47

## 2024-01-16 RX ADMIN — ATORVASTATIN CALCIUM 80 MG: 20 TABLET, FILM COATED ORAL at 08:47

## 2024-01-16 RX ADMIN — GABAPENTIN 600 MG: 300 CAPSULE ORAL at 20:27

## 2024-01-16 RX ADMIN — BUDESONIDE 9 MG: 3 CAPSULE, GELATIN COATED ORAL at 08:46

## 2024-01-16 RX ADMIN — GABAPENTIN 300 MG: 300 CAPSULE ORAL at 08:47

## 2024-01-16 RX ADMIN — FOLIC ACID 1 MG: 1 TABLET ORAL at 08:47

## 2024-01-16 RX ADMIN — CHOLESTYRAMINE 4 G: 4 POWDER, FOR SUSPENSION ORAL at 08:47

## 2024-01-16 RX ADMIN — ASPIRIN 81 MG: 81 TABLET, CHEWABLE ORAL at 08:47

## 2024-01-16 RX ADMIN — ENOXAPARIN SODIUM 40 MG: 100 INJECTION SUBCUTANEOUS at 08:47

## 2024-01-16 RX ADMIN — CARVEDILOL 25 MG: 12.5 TABLET, FILM COATED ORAL at 08:47

## 2024-01-16 RX ADMIN — CARVEDILOL 25 MG: 12.5 TABLET, FILM COATED ORAL at 18:14

## 2024-01-16 RX ADMIN — Medication 1 CAPSULE: at 18:13

## 2024-01-16 RX ADMIN — Medication 1 CAPSULE: at 08:47

## 2024-01-16 ASSESSMENT — PAIN DESCRIPTION - LOCATION
LOCATION: OTHER (COMMENT)
LOCATION: PELVIS;BACK

## 2024-01-16 ASSESSMENT — PAIN DESCRIPTION - DESCRIPTORS
DESCRIPTORS: ACHING
DESCRIPTORS: ACHING

## 2024-01-16 ASSESSMENT — PAIN DESCRIPTION - ORIENTATION: ORIENTATION: POSTERIOR;LEFT

## 2024-01-16 ASSESSMENT — PAIN - FUNCTIONAL ASSESSMENT: PAIN_FUNCTIONAL_ASSESSMENT: ACTIVITIES ARE NOT PREVENTED

## 2024-01-16 ASSESSMENT — PAIN SCALES - GENERAL: PAINLEVEL_OUTOF10: 7

## 2024-01-16 NOTE — PROGRESS NOTES
Hospitalist Progress Note      NAME:  Sujata Ernandez   :  1945  MRM:  [id number]    Date/Time: 2024  10:54 AM           Assessment / Plan:     78 years old female presented to the emergency room after 2 falls reported at home, was found to have Acute left pubic body fracture. Left anterior pelvic hematoma with possible L2 minimally displaced superior endplate fracture deformity.      #Acute left pubic body fracture. Left anterior pelvic hematoma.  #L2 minimally displaced superior endplate fracture deformity.   -MRI confirmed fracture   -Ortho has been consulted : no surgical intervention required, will continue with conservative treatment   -Pain control   -PT/OT   Pending SNF placement      #Metabolic encephalopathy 2/  #UTI  - Continue rocephin IV  - FU urine cultures      #Fall/Debility   -PT/OT      #Macrocytic anemia   #B12 Deficiency   -Start IM B12 x 3 days, continue oral b12 on d/c    -Oral folate     # Incidental finding 3.5 cm infrarenal abdominal aortic aneurysm.   Recommend ultrasound follow-up every 12 months.     #Hx of HTN/ TIA/ HLD: Continue home coreg,statin      #Chronic pancreatitis: Continue home budesonide 9 mg QD     #Neuropathy: continue gabapentin BID      #Aortic stenosis sp TAVR: fu op     I have personally reviewed the radiographs, laboratory data in Epic and decisions and statements above are based partially on this personal interpretation.                 Care Plan discussed with: Patient    Discussed:  Care Plan    Prophylaxis:  Lovenox    Disposition:  SNF/LTC           ___________________________________________________    Attending Physician: Gregg Lopez MD        Subjective:     Chief Complaint:  Fall/Hip fracture     ROS:  (bold if positive, if negative)    Tolerating PT  Tolerating Diet          Objective:     Pt is alert, oriented   Very pleasant     Addressed to her and to the family the plan   Vitals:          Last 24hrs VS reviewed since prior 
    Hospitalist Progress Note      NAME:  Sujata Ernandez   :  1945  MRM:  [id number]    Date/Time: 2024  11:29 AM           Assessment / Plan:     78 years old female presented to the emergency room after 2 falls reported at home, was found to have Acute left pubic body fracture. Left anterior pelvic hematoma with possible L2 minimally displaced superior endplate fracture deformity.     Patient had good sleep Hygeine last night and is now oriented x 4. More alert. Improved.      #Encephalopathy 2/2  #UTI  #Stroke? 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 Left anterior pelvic hematoma.  #L2 minimally displaced superior endplate fracture deformity.   -MRI confirmed fracture   -Ortho has been consulted : no surgical intervention required, will continue with conservative treatment   -Pain control   -PT/OT   Pending SNF placement  -denied by [facility]. Checking [facility] and SNF    #UTI  - Rocephin completed     #Fall/Debility   -PT/OT      #Macrocytic anemia   #B12 Deficiency   -IM B12 x 3/3 days  -IN am start oral b12 and on d/c    -Oral folate     # Incidental finding 3.5 cm infrarenal abdominal aortic aneurysm.   Recommend ultrasound follow-up every 12 months.     #Hx of HTN/ TIA/ HLD: Continue home coreg,statin.  Increase Coreg due to hypertension     #Chronic pancreatitis: Continue home budesonide 9 mg QD   
    Hospitalist Progress Note      NAME:  Sujata Ernandez   :  1945  MRM:  [id number]    Date/Time: 2024  1:02 PM           Assessment / Plan:     78 years old female presented to the emergency room after 2 falls reported at home, was found to have Acute left pubic body fracture. Left anterior pelvic hematoma with possible L2 minimally displaced superior endplate fracture deformity.      #Acute left pubic body fracture. Left anterior pelvic hematoma.  -Ortho has been consulted : no surgical intervention required, will continue with conservative treatment   -Pain control   -PT/OT     #Possible L2 minimally displaced superior endplate fracture deformity.   -MRI confirmed fracture   -FU with ortho      #Fall/Debility   -PT/OT       #Metabolic encephalopathy   -UA; pending   -Pt has been treated recently for UTI, will start on IV abx while in hospital     #Macrocytic anemia   #B12 Deficiency   -Start IM B12 x 3 days   -Oral folate     # Incidental finding 3.5 cm infrarenal abdominal aortic aneurysm.   Recommend ultrasound follow-up every 12 months.     #Hx of HTN/ TIA/ HLD: Continue home coreg,statin      #Chronic pancreatitis: Continue home budesonide 9 mg QD     #Neuropathy: continue gabapentin BID      #Aortic stenosis sp TAVR: fu op     I have personally reviewed the radiographs, laboratory data in Epic and decisions and statements above are based partially on this personal interpretation.                 Care Plan discussed with: Patient    Discussed:  Care Plan    Prophylaxis:  Lovenox    Disposition:  SNF/LTC           ___________________________________________________    Attending Physician: Gregg Lopez MD        Subjective:     Chief Complaint:  Fall/Hip fracture     ROS:  (bold if positive, if negative)    Tolerating PT  Tolerating Diet          Objective:     Pt is confused today, oriented to self, person, not to place or time   Will start IVF, and UA   Vitals:          Last 24hrs VS 
    Hospitalist Progress Note      NAME:  Sujata Ernandez   :  1945  MRM:  [id number]    Date/Time: 2024  12:40 PM           Assessment / Plan:     78 years old female presented to the emergency room after 2 falls reported at home, was found to have Acute left pubic body fracture. Left anterior pelvic hematoma with possible L2 minimally displaced superior endplate fracture deformity.      #Acute left pubic body fracture. Left anterior pelvic hematoma.  #L2 minimally displaced superior endplate fracture deformity.   -MRI confirmed fracture   -Ortho has been consulted : no surgical intervention required, will continue with conservative treatment   -Pain control   -PT/OT   Pending SNF placement  -denied by [facility]    #Metabolic encephalopathy /  #UTI  - Continue rocephin IV - day 5/5  - FU urine cultures   - Encephalopathy improved -family is concerned about morning confusion and drowsiness  - Sleep hygiene with day night cycling ordered  - Opiate doses decreased  - Discussed extensively with family     #Fall/Debility   -PT/OT      #Macrocytic anemia   #B12 Deficiency   -IM B12 x 3/3 days  -IN am start oral b12 and on d/c    -Oral folate     # Incidental finding 3.5 cm infrarenal abdominal aortic aneurysm.   Recommend ultrasound follow-up every 12 months.     #Hx of HTN/ TIA/ HLD: Continue home coreg,statin.  Increase Coreg due to hypertension     #Chronic pancreatitis: Continue home budesonide 9 mg QD     #Neuropathy: continue gabapentin BID      #Aortic stenosis sp TAVR: fu op     I have personally reviewed the radiographs, laboratory data in Epic and decisions and statements above are based partially on this personal interpretation.                 Care Plan discussed with: Patient    Discussed:  Care Plan    Prophylaxis:  Lovenox    Disposition:  SNF/LTC           ___________________________________________________    Attending Physician: Lillie Crowder MD        Subjective: 
    Hospitalist Progress Note      NAME:  Sujata Ernandez   :  1945  MRM:  [id number]    Date/Time: 1/10/2024  3:00 PM           Assessment / Plan:     78 years old female presented to the emergency room after 2 falls reported at home, was found to have Acute left pubic body fracture. Left anterior pelvic hematoma with possible L2 minimally displaced superior endplate fracture deformity.      #Acute left pubic body fracture. Left anterior pelvic hematoma.  #L2 minimally displaced superior endplate fracture deformity.   -MRI confirmed fracture   -Ortho has been consulted : no surgical intervention required, will continue with conservative treatment   -Pain control   -PT/OT   Pending SNF placement  - [facility] needs more notes from PT    #Metabolic encephalopathy /  #UTI  - Continue rocephin IV - day   - FU urine cultures   - Encephalopathy improved     #Fall/Debility   -PT/OT      #Macrocytic anemia   #B12 Deficiency   -IM B12 x 3/3 days  -IN am start oral b12 and on d/c    -Oral folate     # Incidental finding 3.5 cm infrarenal abdominal aortic aneurysm.   Recommend ultrasound follow-up every 12 months.     #Hx of HTN/ TIA/ HLD: Continue home coreg,statin      #Chronic pancreatitis: Continue home budesonide 9 mg QD     #Neuropathy: continue gabapentin BID      #Aortic stenosis sp TAVR: fu op     I have personally reviewed the radiographs, laboratory data in Epic and decisions and statements above are based partially on this personal interpretation.                 Care Plan discussed with: Patient    Discussed:  Care Plan    Prophylaxis:  Lovenox    Disposition:  SNF/LTC           ___________________________________________________    Attending Physician: Lillie Crowder MD        Subjective:     Chief Complaint:  Fall/Hip fracture     ROS:  (bold if positive, if negative)    Tolerating PT  Tolerating Diet          Objective:     Pt is alert, oriented   Very pleasant. Struggling with PT 
  [facility]  [address] [place]  [address] [phone]         Hospitalist Progress Note        NAME:  Sujata Ernandez   :  1945   MRN:  [id number]    Date/Time:  2024     Patient PCP:  Amanda Paulino APRN - NP    Code Status:  Full Code     Isolation Precautions: No active isolations    Barrier(s) to discharge: Awaiting placement  Estimated date of discharge: Placement  Discharge disposition:  SNF/LTC    Assessment/Plan:    78 years old female presented to the emergency room after 2 falls reported at home, was found to have Acute left pubic body fracture. Left anterior pelvic hematoma with possible L2 minimally displaced superior endplate fracture deformity. With a good night's sleep ow oriented x 4. More alert. Improved.      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 Left anterior pelvic hematoma.  # L2 minimally displaced superior endplate fracture deformity.   MRI confirmed fracture   Ortho has been consulted : no surgical intervention required, will continue with conservative treatment   Pain control   PT/OT  Pending SNF placement  -den[facility] by [facility]. Checking [facility] and SNF     # Acute bacterial UTI  Rocephin completed     # Fall/Debility   PT/OT       # 
0700: Report given to RYAN Hernandez. RN assumed care of patient.   
1015: pt is confused this AM. Per care giver at bedside her confusion seems about the same as it has since admission, but pt confusion includes difficulty with word finding- unsure if this has been ongoing. Notified Dr. Crowder. Neuro exam as documented.    1445: Stroke Education provided to patient and child and the following topics were discussed    1. Patients personal risk factors for stroke are hyperlipidemia, hypertension, and TIA    2. Warning signs of Stroke:        * Sudden numbness or weakness of the face, arm or leg, especially on one side of          The body            * Sudden confusion, trouble speaking or understanding        * Sudden trouble seeing in one or both eyes        * Sudden trouble walking, dizziness, loss of balance or coordination        * Sudden severe headache with no known cause      3. Importance of activation Emergency Medical Services ( 9-1-1 ) immediately if experience any warning signs of stroke.    4. Be sure and schedule a follow-up appointment with your primary care doctor or any specialists as instructed.     5. You must take medicine every day to treat your risk factors for stroke.  Be sure to take your medicines exactly as your doctor tells you: no more, no less.  Know what your medicines are for , what they do.  Anti-thrombotics /anticoagulants can help prevent strokes.  You are taking the following medicine(s)  ASA, lipitor     6.  Smoking and second-hand smoke greatly increase your risk of stroke, cardiovascular disease and death. Smoking history never    7. Information provided was [facility] Stroke Education Binder    8. Documentation of teaching completed in Patient Education Activity and on Care Plan with teaching response noted?  yes    
1515 Attempted US IV x 2 patient calm, unable to obtain IV line.  Discussed options with RN, to reach  out to Rapid response or call the Vascular Access team after 7 am in the morning.    SWETHA Veloz RN  
1900: Bedside shift change report given to RYAN Nettles (oncoming nurse) by RYAN Pastrana (offgoing nurse). Report included the following information Nurse Handoff Report, Adult Overview, Surgery Report, Intake/Output, Recent Results, and Cardiac Rhythm NSR .    2000: Spoke with son regarding medications and he does not have the list. We will need to speak with patient daughter tomorrow.       0537: Primary and a second RN tried to get an new IV placed and labs draw. Both were unsuccessful. Reached out to RRT for placement of an ultrasound guided IV with labs.   
1900: Bedside shift change report given to RYAN Nettles (oncoming nurse) by RYAN Pastrana (offgoing nurse). Report included the following information Nurse Handoff Report, Adult Overview, Surgery Report, Intake/Output, Recent Results, and Cardiac Rhythm NSR to blanca .      1900: Per Dr. Crowder no vitals signs between 9am and 4am. Sign placed on patients door.    2000: Spoke with family regarding night dose of gabapentin. Per family patient takes 600mg nightly. Spoke with on call provider and she changed order for patient.    0700: Bedside shift change report given to RYAN Pastrana (oncoming nurse) by RYAN Nettles (offgoing nurse). Report included the following information Nurse Handoff Report, Adult Overview, Surgery Report, Intake/Output, Recent Results, and Cardiac Rhythm NSR .    
Attempted to work with the patient for Physical Therapy, chart reviewed and discussed with nurse patient just went down for a test. We will continue to follow up with the patient for therapy.  
Bedside and Verbal shift change report given to RYAN Pastrana (oncoming nurse) by RYAN Nettles (offgoing nurse). Report included the following information Nurse Handoff Report, Index, Intake/Output, MAR, Recent Results, and Cardiac Rhythm NSR    0731 Zinc cream and new mepelex applied to pt.     
Bedside and Verbal shift change report given to RYAN Pastrana (oncoming nurse) by RYAN Nettles (offgoing nurse). Report included the following information Nurse Handoff Report, Index, and Intake/Output, MAR, recent results, cardiac rhythm NSR.     No vital signs at nighttime per Dr. Crowder. Notified night shift RN.   
Bedside and Verbal shift change report given to RYAN Pastrana (oncoming nurse) by RYAN Rodriguez (offgoing nurse). Report included the following information Nurse Handoff Report, Index, Intake/Output, MAR, Recent Results, and Cardiac Rhythm NSR .     
Chart reviewed in preparation for physical therapy evaluation. Patient not appropriate for PT interventions this morning. Spoke with RN this afternoon. Initiated evaluation this afternoon, however care team member at bedside and patient unavailable for PT interventions. Re-attempted 30 minutes after however patient remains with care team. Will evaluate tomorrow.     Thank you,  Richie Calderon, PT, DPT    
Occupational Therapy Note:    Chart reviewed and spoke with nursing.  Patient's current BP is 80/60 (from 207/85 earlier this AM).  RN is contacting MD and requesting holding OT evaluation at this time.      14:55  Attempted back this afternoon and care team member was at bedside for IV placement.  Waited ~30 minutes and patient still unavailable.  Will follow-up next tx day for OT evaluation as appropriate.      Ghada Mujica, OTR/L  
Peripheral IV noted to be infiltrated at 0600. IV removed, and RN attempted x2 to obtain another access. RRT RN notified and Mary at bedside attempting to obtain IV access. Increased confusion noted this AM compared to last night. Pt refusing PO potassium and requiring more encouragement to allow another IV to be placed. Discussed confusion and patients refusal with attending provider Gregg Lopez. Will pass along to daytime RN.  
Physical therapy    Orders received, chart reviewed. Pt s/p 2x GLF with Acute left pubic body fracture, Left anterior pelvic hematoma with possible L2 minimally displaced superior endplate fracture deformity.  Pending ortho consult. Will defer eval until ortho POC has been established and WBing precautions have been determined.     Thank you,   Aimee Mix PT, DPT    
Physical therapy services attempted @1:40PM. Reporting DPT arrived to room and observed Pt in side lie \"waiting to have BM\". Pt's private caregiver present providing care. Therapist offered assist and therapy services, but Pt indicated need for BM and preference to remain in side lie. PT Team will try later as time permits and as medically appropriate to patient tolerance.  
Speech LAnguage Pathology EVALUATION/DISCHARGE    Patient: Sujata Ernandez (78 y.o. female)  Date: 1/13/2024  Primary Diagnosis: Fall, initial encounter [W19.XXXA]  Closed fracture of left pubis, initial encounter ([facility]) [S32.502A]  Closed fracture of left pubis, unspecified portion of pubis, initial encounter ([facility]) [S32.502A]  Acute midline low back pain, unspecified whether sciatica present [M54.50]       Precautions:                     ASSESSMENT :  Based on the objective data described below, the patient presents with functional speech and swallowing.  Head Ct: 1. Suspected new hypodensities in the left posterior frontal periventricular  white matter and posterior limb of the left internal capsule, which may  represent new small acute infarcts versus artifact. Consider MRI for further  evaluation.MRI:  No intracranial hemorrhage, mass or acute infarction.  There is no acute intracranial process.  Mild to moderate chronic microvascular ischemic change and minimal cerebral  atrophy.     Chronic left cerebellar infarct.  No acute vessel occlusion or aneurysm is identified.  Nonvisualization of the left vertebral artery is likely related to chronic  diminutive size/occlusion.      Admitted 1-11-24 with pubic body fx.      Patient will be discharged from skilled speech-language pathology services at this time.     PLAN :  Recommendations and Planned Interventions:  Diet: Regular and thin liquids  Upright for all PO     Acute SLP Services: No, patient will be discharged from acute skilled speech-language pathology at this time.    Discharge Recommendations: No, additional SLP treatment not indicated at discharge     SUBJECTIVE:   Patient stated, “I thought I was going to diet yesterday and I didn't have any family here. So I called my kids and they called everyone.”    OBJECTIVE:     Past Medical History:   Diagnosis Date    Aortic stenosis     Hypercholesterolemia     Neuropathy     Idiopathic peripheral    
Spiritual Care Assessment/Progress Note  [facility]    Name: Sujata Ernandez MRN: [id number]    Age: 78 y.o.     Sex: female   Language: English     Date: 2024            Total Time Calculated: 25 min              Spiritual Assessment begun in [facility]  Service Provided For:: Patient, Family     Encounter Overview/Reason : Initial Encounter    Spiritual beliefs:      [x] Involved in a chele tradition/spiritual practice: Sabianism     [] Supported by a chele community:      [] Claims no spiritual orientation:      [] Seeking spiritual identity:           [] Adheres to an individual form of spirituality:      [] Not able to assess:                Identified resources for coping and support system:   Support System: Family members       [x] Prayer                  [] Devotional reading               [] Music                  [] Guided Imagery     [] Pet visits                                        [] Other: (COMMENT)     Specific area/focus of visit   Encounter:    Crisis:    Spiritual/Emotional needs: Type: Spiritual Support  Ritual, Rites and Sacraments:    Grief, Loss, and Adjustments:    Ethics/Mediation:    Behavioral Health:    Palliative Care:    Advance Care Planning:      Plan/Referrals: Other (Comment) (Please contact [facility] for further consults.)    Narrative:   visit for the patient on Post Surg. Reviewed pt's chart. Pt, who goes by Jose M, and her daughter, Rosalee, were present. Pt shared she completed physical therapy today and is tired. Pt's daughter shared recent medical events. Pt has 3 children who live nearby, 5 living grandchildren (one grandson has ), and 3 great-grandsons. Pt finds dustin and partha through spending time with family. Family meeting tonight to talk about next steps. Offered requested assurance of ongoing prayer. Advised of  availability. Please contact [facility] for further 
Ultrasound IV by Geri Simmons RN :  Procedure Note    Ultrasound IV education provided to patient. Opportunities for questions given.     Ultrasound used for PIV placement:  20 gauge 8 cm PowerGlide Pro Midline.  Right basilic vein location.  1 X Attempt(s).    Flushed with ease; vigorous blood return.     Procedure tolerated well. Primary RN aware of IV placement and added to LDA.      Geri Simmons RN  
192 lbs 194 lbs 6 oz   BMI (Calculated)   33.9 kg/m2 33.9 kg/m2 34.3 kg/m2 35.2 kg/m2 35.6 kg/m2       Nutrition Diagnosis:   No nutrition diagnosis at this time          Nutrition Interventions:   Food and/or Nutrient Delivery: Continue Current Diet, Discontinue Oral Nutrition Supplement  Nutrition Education/Counseling: Survival skills/brief education completed  Coordination of Nutrition Care: No recommendation at this time  Plan of Care discussed with: patient, caregiver and RN    Goals:     Goals: PO intake 75% or greater, by next RD assessment       Nutrition Monitoring and Evaluation:   Behavioral-Environmental Outcomes: None Identified  Food/Nutrient Intake Outcomes: Food and Nutrient Intake  Physical Signs/Symptoms Outcomes: Biochemical Data, Fluid Status or Edema, Weight, Skin    Discharge Planning:    Continue current diet     LOYDA SOTO RD, MS  Available via [employer] # [phone]      
PRN    Or    potassium bicarb-citric acid (EFFER-K) effervescent tablet 40 mEq  40 mEq Oral PRN    Or    potassium chloride 10 mEq/100 mL IVPB (Peripheral Line)  10 mEq IntraVENous PRN    magnesium sulfate 2000 mg in 50 mL IVPB premix  2,000 mg IntraVENous PRN    polyethylene glycol (GLYCOLAX) packet 17 g  17 g Oral Daily PRN    ondansetron (ZOFRAN-ODT) disintegrating tablet 4 mg  4 mg Oral Q8H PRN    Or    ondansetron (ZOFRAN) injection 4 mg  4 mg IntraVENous Q6H PRN    0.9 % sodium chloride infusion   IntraVENous Continuous    acetaminophen (TYLENOL) tablet 650 mg  650 mg Oral Q6H    atorvastatin (LIPITOR) tablet 80 mg  80 mg Oral Daily    furosemide (LASIX) tablet 40 mg  40 mg Oral Every Other Day    gabapentin (NEURONTIN) capsule 300 mg  300 mg Oral BID    lactobacillus (CULTURELLE) capsule 1 capsule  1 capsule Oral BID WC    budesonide (ENTOCORT EC) delayed release capsule 9 mg  9 mg Oral Daily    enoxaparin (LOVENOX) injection 40 mg  40 mg SubCUTAneous Daily            Lab Review:     Recent Labs     01/11/24  1025 01/12/24  0117   WBC 7.9 8.2   HGB 11.0* 10.9*   HCT 33.1* 33.9*    185       Recent Labs     01/11/24  1025 01/12/24  0117    138   K 3.5 3.6    107   CO2 25 28   BUN 11 14   MG 1.6 1.5*   PHOS 2.6 2.8       No components found for: \"GLPOC\"      
times per day    sodium chloride flush 0.9 % injection 5-40 mL  5-40 mL IntraVENous PRN    0.9 % sodium chloride infusion   IntraVENous PRN    potassium chloride (KLOR-CON) extended release tablet 40 mEq  40 mEq Oral PRN    Or    potassium bicarb-citric acid (EFFER-K) effervescent tablet 40 mEq  40 mEq Oral PRN    Or    potassium chloride 10 mEq/100 mL IVPB (Peripheral Line)  10 mEq IntraVENous PRN    magnesium sulfate 2000 mg in 50 mL IVPB premix  2,000 mg IntraVENous PRN    polyethylene glycol (GLYCOLAX) packet 17 g  17 g Oral Daily PRN    ondansetron (ZOFRAN-ODT) disintegrating tablet 4 mg  4 mg Oral Q8H PRN    Or    ondansetron (ZOFRAN) injection 4 mg  4 mg IntraVENous Q6H PRN    0.9 % sodium chloride infusion   IntraVENous Continuous    acetaminophen (TYLENOL) tablet 650 mg  650 mg Oral Q6H    atorvastatin (LIPITOR) tablet 80 mg  80 mg Oral Daily    furosemide (LASIX) tablet 40 mg  40 mg Oral Every Other Day    gabapentin (NEURONTIN) capsule 300 mg  300 mg Oral BID    lactobacillus (CULTURELLE) capsule 1 capsule  1 capsule Oral BID WC    budesonide (ENTOCORT EC) delayed release capsule 9 mg  9 mg Oral Daily    enoxaparin (LOVENOX) injection 40 mg  40 mg SubCUTAneous Daily            Lab Review:     Recent Labs     01/11/24  1025 01/12/24  0117   WBC 7.9 8.2   HGB 11.0* 10.9*   HCT 33.1* 33.9*    185       Recent Labs     01/11/24  1025 01/12/24  0117    138   K 3.5 3.6    107   CO2 25 28   BUN 11 14   MG 1.6 1.5*   PHOS 2.6 2.8       No components found for: \"GLPOC\"      
2 times per day    sodium chloride flush 0.9 % injection 5-40 mL  5-40 mL IntraVENous PRN    0.9 % sodium chloride infusion   IntraVENous PRN    potassium chloride (KLOR-CON) extended release tablet 40 mEq  40 mEq Oral PRN    Or    potassium bicarb-citric acid (EFFER-K) effervescent tablet 40 mEq  40 mEq Oral PRN    Or    potassium chloride 10 mEq/100 mL IVPB (Peripheral Line)  10 mEq IntraVENous PRN    magnesium sulfate 2000 mg in 50 mL IVPB premix  2,000 mg IntraVENous PRN    polyethylene glycol (GLYCOLAX) packet 17 g  17 g Oral Daily PRN    ondansetron (ZOFRAN-ODT) disintegrating tablet 4 mg  4 mg Oral Q8H PRN    Or    ondansetron (ZOFRAN) injection 4 mg  4 mg IntraVENous Q6H PRN    0.9 % sodium chloride infusion   IntraVENous Continuous    acetaminophen (TYLENOL) tablet 650 mg  650 mg Oral Q6H    atorvastatin (LIPITOR) tablet 80 mg  80 mg Oral Daily    furosemide (LASIX) tablet 40 mg  40 mg Oral Every Other Day    lactobacillus (CULTURELLE) capsule 1 capsule  1 capsule Oral BID     budesonide (ENTOCORT EC) delayed release capsule 9 mg  9 mg Oral Daily    enoxaparin (LOVENOX) injection 40 mg  40 mg SubCUTAneous Daily          The External notes reviewed, ER provider, labs, imaging studies, medications, consultants notes, and latest ancillary notes was reviewed by me on: January 15, 2024               ___________________________________________________    Signed:    Alcides Bhatia MD       
 [] No  Prescription drug management (Moderate Risk):       [] Yes     [] No  Decision regarding minor surgery (Moderate Risk):      [] Yes      [] No

## 2024-01-16 NOTE — CARE COORDINATION
Case management note-    CM sent ref for [facility] via [employer]. Attempted to contact Cam (liaison) unable to reach left  message requesting call back.    Updated info sent to [facility] via [employer], advised them that patient stable for discharge.   Patient's daughter, Rosalee given update on referrals. Rosalee advises 1st choice [facility], 2nd choice between [facility] and [facility].     Update-  Rosalee was advised patient accepted to [facility] and [facility], [facility] still pending. She will check with family as she thinks [facility] might be best option at this time.    Bed available at [facility] tomorrow am. Cam will have bed assignment info available in am. Patient will need St[facility] transport.    Prabha Siddiqui RN, [facility]  Care management

## 2024-01-17 VITALS
SYSTOLIC BLOOD PRESSURE: 122 MMHG | HEIGHT: 62 IN | WEIGHT: 185 LBS | BODY MASS INDEX: 34.04 KG/M2 | HEART RATE: 70 BPM | DIASTOLIC BLOOD PRESSURE: 72 MMHG | RESPIRATION RATE: 15 BRPM | OXYGEN SATURATION: 97 % | TEMPERATURE: 97.9 F

## 2024-01-17 PROCEDURE — 6360000002 HC RX W HCPCS: Performed by: STUDENT IN AN ORGANIZED HEALTH CARE EDUCATION/TRAINING PROGRAM

## 2024-01-17 PROCEDURE — 6370000000 HC RX 637 (ALT 250 FOR IP): Performed by: NURSE PRACTITIONER

## 2024-01-17 PROCEDURE — 6370000000 HC RX 637 (ALT 250 FOR IP): Performed by: INTERNAL MEDICINE

## 2024-01-17 PROCEDURE — 6370000000 HC RX 637 (ALT 250 FOR IP): Performed by: STUDENT IN AN ORGANIZED HEALTH CARE EDUCATION/TRAINING PROGRAM

## 2024-01-17 PROCEDURE — 2580000003 HC RX 258: Performed by: NURSE PRACTITIONER

## 2024-01-17 PROCEDURE — 2580000003 HC RX 258: Performed by: STUDENT IN AN ORGANIZED HEALTH CARE EDUCATION/TRAINING PROGRAM

## 2024-01-17 PROCEDURE — 2580000003 HC RX 258: Performed by: INTERNAL MEDICINE

## 2024-01-17 RX ORDER — LANOLIN ALCOHOL/MO/W.PET/CERES
400 CREAM (GRAM) TOPICAL 2 TIMES DAILY
Qty: 10 TABLET | Refills: 0 | Status: SHIPPED
Start: 2024-01-17 | End: 2024-01-22

## 2024-01-17 RX ORDER — GABAPENTIN 300 MG/1
600 CAPSULE ORAL NIGHTLY
Qty: 4 CAPSULE | Refills: 0 | Status: SHIPPED | OUTPATIENT
Start: 2024-01-17 | End: 2024-01-19

## 2024-01-17 RX ORDER — OXYCODONE HYDROCHLORIDE 5 MG/1
5 TABLET ORAL EVERY 6 HOURS PRN
Qty: 8 TABLET | Refills: 0 | Status: SHIPPED | OUTPATIENT
Start: 2024-01-17 | End: 2024-01-19

## 2024-01-17 RX ORDER — GABAPENTIN 300 MG/1
300 CAPSULE ORAL DAILY
Qty: 2 CAPSULE | Refills: 0 | Status: SHIPPED | OUTPATIENT
Start: 2024-01-17 | End: 2024-01-19

## 2024-01-17 RX ORDER — BACLOFEN 5 MG/1
5 TABLET ORAL EVERY 12 HOURS PRN
Qty: 4 TABLET | Refills: 0 | Status: SHIPPED
Start: 2024-01-17 | End: 2024-01-19

## 2024-01-17 RX ORDER — FOLIC ACID 1 MG/1
1 TABLET ORAL DAILY
Qty: 30 TABLET | Refills: 3 | Status: SHIPPED
Start: 2024-01-17

## 2024-01-17 RX ORDER — ASPIRIN 81 MG/1
81 TABLET, CHEWABLE ORAL DAILY
Qty: 30 TABLET | Refills: 0 | Status: SHIPPED
Start: 2024-01-17

## 2024-01-17 RX ORDER — LISINOPRIL 5 MG/1
5 TABLET ORAL DAILY
Qty: 30 TABLET | Refills: 0 | Status: SHIPPED
Start: 2024-01-17

## 2024-01-17 RX ADMIN — CARVEDILOL 25 MG: 12.5 TABLET, FILM COATED ORAL at 08:33

## 2024-01-17 RX ADMIN — ENOXAPARIN SODIUM 40 MG: 100 INJECTION SUBCUTANEOUS at 08:34

## 2024-01-17 RX ADMIN — SODIUM CHLORIDE: 9 INJECTION, SOLUTION INTRAVENOUS at 06:25

## 2024-01-17 RX ADMIN — Medication 1 CAPSULE: at 08:33

## 2024-01-17 RX ADMIN — ACETAMINOPHEN 650 MG: 325 TABLET ORAL at 13:54

## 2024-01-17 RX ADMIN — BUDESONIDE 9 MG: 3 CAPSULE, GELATIN COATED ORAL at 08:33

## 2024-01-17 RX ADMIN — SODIUM CHLORIDE, PRESERVATIVE FREE 10 ML: 5 INJECTION INTRAVENOUS at 08:43

## 2024-01-17 RX ADMIN — CHOLESTYRAMINE 4 G: 4 POWDER, FOR SUSPENSION ORAL at 08:33

## 2024-01-17 RX ADMIN — OXYCODONE 5 MG: 5 TABLET ORAL at 13:55

## 2024-01-17 RX ADMIN — FOLIC ACID 1 MG: 1 TABLET ORAL at 08:33

## 2024-01-17 RX ADMIN — ASPIRIN 81 MG: 81 TABLET, CHEWABLE ORAL at 08:33

## 2024-01-17 RX ADMIN — ATORVASTATIN CALCIUM 80 MG: 20 TABLET, FILM COATED ORAL at 08:33

## 2024-01-17 RX ADMIN — CYANOCOBALAMIN TAB 500 MCG 500 MCG: 500 TAB at 08:33

## 2024-01-17 RX ADMIN — LISINOPRIL 5 MG: 5 TABLET ORAL at 08:33

## 2024-01-17 RX ADMIN — GABAPENTIN 300 MG: 300 CAPSULE ORAL at 08:33

## 2024-01-17 RX ADMIN — FUROSEMIDE 40 MG: 40 TABLET ORAL at 08:46

## 2024-01-17 ASSESSMENT — PAIN SCALES - GENERAL: PAINLEVEL_OUTOF10: 0

## 2024-01-17 NOTE — DISCHARGE SUMMARY
[facility]  [address] [address], [place]  [id number] [phone]       PHYSICIAN DISCHARGE SUMMARY        Name:  Sujata Ernandez, 78 y.o.  :    1945  MRN:    [id number]  PCP:    Amanda Paulino APRN - NP      Date of Admission: 2024  Date of Discharge:   2024 8:24 AM  Disposition:  SNF/LTC  Condition:  improved, stable, and good      Patient PCP:  Amanda Paulino APRN - NP    Emergency Contact:    Extended Emergency Contact Information  Primary Emergency Contact: Ernandez,Rosalee  Home Phone: [phone]  Relation: Child  Secondary Emergency Contact: ChristianValarie  [employer] Phone: [phone]  Relation: Child      Primary Decision Maker: ErnandezRosalee - Child - [phone]    Secondary Decision Maker: Valarie Gonzales - Child - [phone]    Code Status:  Full Code     Admission Status: Inpatient       Consultations:  IP CONSULT TO ORTHOPEDIC SURGERY  IP CONSULT TO NEUROLOGY    Reason for Admission:   Fall, initial encounter [W19.XXXA]  Closed fracture of left pubis, initial encounter ([facility]) [S32.502A]  Closed fracture of left pubis, unspecified portion of pubis, initial encounter ([facility]) [S32.502A]  Acute midline low back pain, unspecified whether sciatica present [M54.50]    Discharge Diagnoses:    Principal Problem:    Closed fracture of left pubis, initial encounter ([facility])  Active Problems:    Confusion    Delirium  Resolved Problems:    * No resolved hospital problems. *        Procedures:  EEGon 24:  Impression:  Mild generalized slowing during wakefulness suggestive of an encephalopathy.  No seizure discharges seen. Clinical correlation is necessary.      Imaging  MRI brain without contrast    Result Date: 2024  CLINICAL HISTORY: CVA, confusion, neck pain, weakness, status post fall INDICATION: stroke. COMPARISON: 2024 TECHNIQUE: MR examination of the brain includes axial and sagittal T1 , axial T2, axial FLAIR, axial

## 2024-01-17 NOTE — DISCHARGE INSTRUCTIONS
Patient Discharge Instructions    Sujata Ernandez / [id number] : 1945    Admitted 2024 Discharged: 2024       Discharge Medications:   It is important that you take the medication exactly as they are prescribed.   Keep your medication in the bottles provided by the pharmacist and keep a list of the medication names, dosages, and times to be taken in your wallet.   Do not take other medications without consulting your doctor.   Call your PCP for medication refills      What to do at Home    Take medications as prescribed and follow up with Facility provider.  Call your PCP for refills of your medications.      Recommended Diet:  ADULT DIET; Regular; Low Fat/Low Chol/High Fiber/AMITA       Recommended Activity: As tolerated per facility (SNF, LTC, LTAC, AR) discretion      **If you experience any of the following symptoms chest pain, shortness of breath, fevers, chills, signs of infection, and symptoms of stroke, please follow up with PCP or go to the nearest ER.**           Alcides Bhatia MD     2024

## 2024-01-17 NOTE — CARE COORDINATION
1/17/2024  10:41 AM  Transition of Care Plan to SNF/Rehab    Communication to Patient/Family:  Met with patient and family and they are agreeable to the transition plan. The Plan for Transition of Care is related to the following treatment goals: Pubic fracture    The Patient and/or patient representative was provided with a choice of provider and agrees  with the discharge plan.      Yes [x] No []    A Freedom of choice list was provided with basic dialogue that supports the patient's individualized plan of care/goals and shares the quality data associated with the providers.       Yes [x] No []    SNF/Rehab Transition:  Patient has been accepted to [facility] SNF/Rehab and meets criteria for admission.     SNF reports auth has been received? []  Medicare 3 night stay satisfied? [x]    Patient will transported by [facility] and expected to leave at 2:00 PM. PCS completed, and packet in chart.     Communication to SNF/Rehab:  Bedside RN, Janneth, has been notified to update the transition plan to the facility and call report (phone number , ).  Discharge information has been updated on the AVS. And communicated to facility via [employer], or CC link.     Discharge instructions to be fax'd to facility via [x] [employer] [] CCLink      Nursing Please include all hard scripts for controlled substances, med rec and dc summary, and AVS in packet.       Nursing, please discuss the following applicable information with [facility] nursing during report:     Kyree with (X) only those applicable:  Medication:  []Medications are available at the facility  []IV Antibiotics    []Controlled Substance - hard copies available sent.  []Weekly Labs    Equipment:  []CPAP/BiPAP  []Wound Vacuum  []Puentes or Urinary Device  []PICC/Central Line  []Nebulizer  []Ventilator    Treatment:  []Isolation (for MRSA, VRE, etc.)  []Surgical Drain Management  []Tracheostomy

## 2024-01-17 NOTE — PLAN OF CARE
Problem: Discharge Planning  Goal: Discharge to home or other facility with appropriate resources  1/17/2024 1055 by Janneth Falcon LPN  Outcome: Progressing  Flowsheets (Taken 1/17/2024 0750)  Discharge to home or other facility with appropriate resources:   Identify barriers to discharge with patient and caregiver   Arrange for needed discharge resources and transportation as appropriate   Identify discharge learning needs (meds, wound care, etc)   Refer to discharge planning if patient needs post-hospital services based on physician order or complex needs related to functional status, cognitive ability or social support system  1/17/2024 0056 by Amberly Khan RN  Outcome: Progressing     Problem: Pain  Goal: Verbalizes/displays adequate comfort level or baseline comfort level  1/17/2024 1055 by Janneth Falcon LPN  Outcome: Progressing  Flowsheets (Taken 1/17/2024 0750)  Verbalizes/displays adequate comfort level or baseline comfort level:   Encourage patient to monitor pain and request assistance   Assess pain using appropriate pain scale   Administer analgesics based on type and severity of pain and evaluate response   Implement non-pharmacological measures as appropriate and evaluate response   Consider cultural and social influences on pain and pain management   Notify Licensed Independent Practitioner if interventions unsuccessful or patient reports new pain  1/17/2024 0056 by Amberly Khan RN  Outcome: Progressing     Problem: Safety - Adult  Goal: Free from fall injury  1/17/2024 1055 by Janneth Falcon LPN  Outcome: Progressing  1/17/2024 0056 by Amberly Khan RN  Outcome: Progressing     Problem: ABCDS Injury Assessment  Goal: Absence of physical injury  1/17/2024 1055 by Janneth Falcon LPN  Outcome: Progressing  1/17/2024 0056 by Amberly Khan RN  Outcome: Progressing     Problem: Chronic Conditions and Co-morbidities  Goal: Patient's chronic conditions and 
  Problem: Discharge Planning  Goal: Discharge to home or other facility with appropriate resources  1/9/2024 1044 by Mary Szymanski RN  Outcome: Progressing  1/9/2024 0247 by Mars Silverman RN  Outcome: Progressing     Problem: Pain  Goal: Verbalizes/displays adequate comfort level or baseline comfort level  1/9/2024 1044 by Mary Szymanski RN  Outcome: Progressing  1/9/2024 0247 by Mars Silverman RN  Outcome: Progressing  Flowsheets (Taken 1/9/2024 0247)  Verbalizes/displays adequate comfort level or baseline comfort level:   Encourage patient to monitor pain and request assistance   Assess pain using appropriate pain scale     Problem: Safety - Adult  Goal: Free from fall injury  1/9/2024 1044 by Mary Szymanski RN  Outcome: Progressing  1/9/2024 0247 by Mars Silverman RN  Outcome: Progressing  Flowsheets (Taken 1/9/2024 0247)  Free From Fall Injury: Instruct family/caregiver on patient safety     Problem: ABCDS Injury Assessment  Goal: Absence of physical injury  Outcome: Progressing     Problem: Chronic Conditions and Co-morbidities  Goal: Patient's chronic conditions and co-morbidity symptoms are monitored and maintained or improved  1/9/2024 1044 by Mary Szymanski RN  Outcome: Progressing  1/9/2024 0247 by Mars Silverman RN  Outcome: Progressing  Flowsheets (Taken 1/9/2024 0247)  Care Plan - Patient's Chronic Conditions and Co-Morbidity Symptoms are Monitored and Maintained or Improved: Monitor and assess patient's chronic conditions and comorbid symptoms for stability, deterioration, or improvement     
  Problem: Discharge Planning  Goal: Discharge to home or other facility with appropriate resources  Outcome: Progressing     Problem: Pain  Goal: Verbalizes/displays adequate comfort level or baseline comfort level  Outcome: Progressing     Problem: Safety - Adult  Goal: Free from fall injury  Outcome: Progressing     Problem: ABCDS Injury Assessment  Goal: Absence of physical injury  Outcome: Progressing     Problem: Chronic Conditions and Co-morbidities  Goal: Patient's chronic conditions and co-morbidity symptoms are monitored and maintained or improved  Outcome: Progressing     
  Problem: Discharge Planning  Goal: Discharge to home or other facility with appropriate resources  Outcome: Progressing     Problem: Pain  Goal: Verbalizes/displays adequate comfort level or baseline comfort level  Outcome: Progressing     Problem: Safety - Adult  Goal: Free from fall injury  Outcome: Progressing     Problem: ABCDS Injury Assessment  Goal: Absence of physical injury  Outcome: Progressing     Problem: Chronic Conditions and Co-morbidities  Goal: Patient's chronic conditions and co-morbidity symptoms are monitored and maintained or improved  Outcome: Progressing     Problem: Occupational Therapy - Adult  Goal: By Discharge: Performs self-care activities at highest level of function for planned discharge setting.  See evaluation for individualized goals.  Description: FUNCTIONAL STATUS PRIOR TO ADMISSION:  The patient was supervision for functional mobility using RW. She had assistance/supervision from her caregiver and her daughter for mobility and for ADLs as needed. Has history of multiple falls.  HOME SUPPORT: Patient lived with her daughter.    Occupational Therapy Goals:  Initiated 1/9/2024  1.  Patient will perform grooming with Supervision in sitting within 7 day(s).  2.  Patient will perform upper body dressing with Minimal Assist within 7 day(s).  3.  Patient will perform toilet transfers with Moderate Assist  within 7 day(s).  4.  Patient will perform all aspects of toileting with Moderate Assist within 7 day(s).  5.  Patient will participate in upper extremity therapeutic exercise/activities with Supervision for 5 minutes within 7 day(s).      1/15/2024 1251 by Nettie Hidalgo OT  Outcome: Progressing     Problem: Physical Therapy - Adult  Goal: By Discharge: Performs mobility at highest level of function for planned discharge setting.  See evaluation for individualized goals.  Description: FUNCTIONAL STATUS PRIOR TO ADMISSION: Patient was modified independent using a rolling walker 
  Problem: Discharge Planning  Goal: Discharge to home or other facility with appropriate resources  Outcome: Progressing     Problem: Pain  Goal: Verbalizes/displays adequate comfort level or baseline comfort level  Outcome: Progressing     Problem: Safety - Adult  Goal: Free from fall injury  Outcome: Progressing     Problem: ABCDS Injury Assessment  Goal: Absence of physical injury  Outcome: Progressing     Problem: Chronic Conditions and Co-morbidities  Goal: Patient's chronic conditions and co-morbidity symptoms are monitored and maintained or improved  Outcome: Progressing     Problem: Skin/Tissue Integrity  Goal: Absence of new skin breakdown  Description: 1.  Monitor for areas of redness and/or skin breakdown  2.  Assess vascular access sites hourly  3.  Every 4-6 hours minimum:  Change oxygen saturation probe site  4.  Every 4-6 hours:  If on nasal continuous positive airway pressure, respiratory therapy assess nares and determine need for appliance change or resting period.  Outcome: Progressing     
  Problem: Discharge Planning  Goal: Discharge to home or other facility with appropriate resources  Outcome: Progressing     Problem: Pain  Goal: Verbalizes/displays adequate comfort level or baseline comfort level  Outcome: Progressing     Problem: Safety - Adult  Goal: Free from fall injury  Outcome: Progressing     Problem: ABCDS Injury Assessment  Goal: Absence of physical injury  Outcome: Progressing     Problem: Occupational Therapy - Adult  Goal: By Discharge: Performs self-care activities at highest level of function for planned discharge setting.  See evaluation for individualized goals.  Description: FUNCTIONAL STATUS PRIOR TO ADMISSION:  The patient was supervision for functional mobility using RW. She had assistance/supervision from her caregiver and her daughter for mobility and for ADLs as needed. Has history of multiple falls.  HOME SUPPORT: Patient lived with her daughter.    Occupational Therapy Goals:  Initiated 1/9/2024  1.  Patient will perform grooming with Supervision in sitting within 7 day(s).  2.  Patient will perform upper body dressing with Minimal Assist within 7 day(s).  3.  Patient will perform toilet transfers with Moderate Assist  within 7 day(s).  4.  Patient will perform all aspects of toileting with Moderate Assist within 7 day(s).  5.  Patient will participate in upper extremity therapeutic exercise/activities with Supervision for 5 minutes within 7 day(s).      1/16/2024 1552 by Elena Bond, OTR/L  Outcome: Progressing     
  Problem: Discharge Planning  Goal: Discharge to home or other facility with appropriate resources  Outcome: Progressing     Problem: Pain  Goal: Verbalizes/displays adequate comfort level or baseline comfort level  Outcome: Progressing  Flowsheets (Taken 1/9/2024 0247)  Verbalizes/displays adequate comfort level or baseline comfort level:   Encourage patient to monitor pain and request assistance   Assess pain using appropriate pain scale     Problem: Safety - Adult  Goal: Free from fall injury  Outcome: Progressing  Flowsheets (Taken 1/9/2024 0247)  Free From Fall Injury: Instruct family/caregiver on patient safety     Problem: Chronic Conditions and Co-morbidities  Goal: Patient's chronic conditions and co-morbidity symptoms are monitored and maintained or improved  Outcome: Progressing  Flowsheets (Taken 1/9/2024 0247)  Care Plan - Patient's Chronic Conditions and Co-Morbidity Symptoms are Monitored and Maintained or Improved: Monitor and assess patient's chronic conditions and comorbid symptoms for stability, deterioration, or improvement     
  Problem: Occupational Therapy - Adult  Goal: By Discharge: Performs self-care activities at highest level of function for planned discharge setting.  See evaluation for individualized goals.  Description: FUNCTIONAL STATUS PRIOR TO ADMISSION:  The patient was supervision for functional mobility using RW. She had assistance/supervision from her caregiver and her daughter for mobility and for ADLs as needed. Has history of multiple falls.  HOME SUPPORT: Patient lived with her daughter.    Occupational Therapy Goals:  Initiated 1/9/2024  1.  Patient will perform grooming with Supervision in sitting within 7 day(s).  2.  Patient will perform upper body dressing with Minimal Assist within 7 day(s).  3.  Patient will perform toilet transfers with Moderate Assist  within 7 day(s).  4.  Patient will perform all aspects of toileting with Moderate Assist within 7 day(s).  5.  Patient will participate in upper extremity therapeutic exercise/activities with Supervision for 5 minutes within 7 day(s).      Outcome: Progressing     OCCUPATIONAL THERAPY TREATMENT  Patient: Sujata Ernandez (78 y.o. female)  Date: 1/15/2024  Primary Diagnosis: Fall, initial encounter [W19.XXXA]  Closed fracture of left pubis, initial encounter ([facility]) [S32.502A]  Closed fracture of left pubis, unspecified portion of pubis, initial encounter ([facility]) [S32.502A]  Acute midline low back pain, unspecified whether sciatica present [M54.50]       Precautions:                  Chart, occupational therapy assessment, plan of care, and goals were reviewed.    ASSESSMENT  Patient received semi supine in bed and agreeable for OT/PT treatment. Patient continues to benefit from skilled OT services and is progressing towards goals. Patient requiring total A zeke right knee brace and Cam Boot. Patient requiring mod Ax2 rolling and max Ax2 sup<->sit and scooting EOB. Patient requiring encouragement for continuation of EOB sitting and further activities. Patient 
  Problem: Occupational Therapy - Adult  Goal: By Discharge: Performs self-care activities at highest level of function for planned discharge setting.  See evaluation for individualized goals.  Description: FUNCTIONAL STATUS PRIOR TO ADMISSION:  The patient was supervision for functional mobility using RW. She had assistance/supervision from her caregiver and her daughter for mobility and for ADLs as needed. Has history of multiple falls.  HOME SUPPORT: Patient lived with her daughter.    Occupational Therapy Goals:  Initiated 1/9/2024  1.  Patient will perform grooming with Supervision in sitting within 7 day(s).  2.  Patient will perform upper body dressing with Minimal Assist within 7 day(s).  3.  Patient will perform toilet transfers with Moderate Assist  within 7 day(s).  4.  Patient will perform all aspects of toileting with Moderate Assist within 7 day(s).  5.  Patient will participate in upper extremity therapeutic exercise/activities with Supervision for 5 minutes within 7 day(s).      Outcome: Progressing    OCCUPATIONAL THERAPY TREATMENT  Patient: Sujata Ernandez (78 y.o. female)  Date: 1/16/2024  Primary Diagnosis: Fall, initial encounter [W19.XXXA]  Closed fracture of left pubis, initial encounter ([facility]) [S32.502A]  Closed fracture of left pubis, unspecified portion of pubis, initial encounter ([facility]) [S32.502A]  Acute midline low back pain, unspecified whether sciatica present [M54.50]       Precautions: Fall Risk, Other (comment) (CAM boot)                Chart, occupational therapy assessment, plan of care, and goals were reviewed.    ASSESSMENT  Patient continues to benefit from skilled OT services and is progressing towards goals. Patient seen at bed level for instruction on UE isometric exercises, LE exercises in bed to incorporate throughout the day. Patient's caregiver present throughout and will be able to assist with carryover of exercises.          PLAN :  Patient continues to benefit 
  Problem: Occupational Therapy - Adult  Goal: By Discharge: Performs self-care activities at highest level of function for planned discharge setting.  See evaluation for individualized goals.  Description: FUNCTIONAL STATUS PRIOR TO ADMISSION:  The patient was supervision for functional mobility using RW. She had assistance/supervision from her caregiver and her daughter for mobility and for ADLs as needed. Has history of multiple falls.  HOME SUPPORT: Patient lived with her daughter.    Occupational Therapy Goals:  Initiated 1/9/2024  1.  Patient will perform grooming with Supervision in sitting within 7 day(s).  2.  Patient will perform upper body dressing with Minimal Assist within 7 day(s).  3.  Patient will perform toilet transfers with Moderate Assist  within 7 day(s).  4.  Patient will perform all aspects of toileting with Moderate Assist within 7 day(s).  5.  Patient will participate in upper extremity therapeutic exercise/activities with Supervision for 5 minutes within 7 day(s).      Outcome: Progressing   OCCUPATIONAL THERAPY TREATMENT  Patient: Sujata Ernandez (78 y.o. female)  Date: 1/11/2024  Primary Diagnosis: Fall, initial encounter [W19.XXXA]  Closed fracture of left pubis, initial encounter ([facility]) [S32.502A]  Closed fracture of left pubis, unspecified portion of pubis, initial encounter ([facility]) [S32.502A]  Acute midline low back pain, unspecified whether sciatica present [M54.50]       Precautions:                  Chart, occupational therapy assessment, plan of care, and goals were reviewed.    ASSESSMENT  Patient continues to benefit from skilled OT services and is slowly progressing towards goals. Pt limited by decreased endurance, strength, activity tolerance. Assist x 2 to sit edge of bed, max assist x 2 to stand with alaina steady for transfer to [facility]. Pt dep for hygiene and verbalized back pain, pain from hemorrhoids seated. She stood total of 3 x and was not safe to leave in chair so 
  Problem: Physical Therapy - Adult  Goal: By Discharge: Performs mobility at highest level of function for planned discharge setting.  See evaluation for individualized goals.  Description: FUNCTIONAL STATUS PRIOR TO ADMISSION: Patient was modified independent using a rolling walker for functional mobility.    HOME SUPPORT PRIOR TO ADMISSION: The patient lived with daughter has care giver 8 to 5 except Thursday daughter with the patient when care giver not there.    Physical Therapy Goals  Initiated 1/9/2024  1.  Patient will move from supine to sit and sit to supine, scoot up and down, and roll side to side in bed with contact guard assist within 7 day(s).    2.  Patient will perform sit to stand with contact guard assist within 7 day(s).  3.  Patient will transfer from bed to chair and chair to bed with contact guard assist using the least restrictive device within 7 day(s).  4.  Patient will ambulate with minimal assistance for 100 feet with the least restrictive device within 7 day(s).     Outcome: Progressing   PHYSICAL THERAPY EVALUATION    Patient: Sujata Ernandez (78 y.o. female)  Date: 1/9/2024  Primary Diagnosis: Fall, initial encounter [W19.XXXA]  Closed fracture of left pubis, initial encounter ([facility]) [S32.502A]  Closed fracture of left pubis, unspecified portion of pubis, initial encounter ([facility]) [S32.502A]  Acute midline low back pain, unspecified whether sciatica present [M54.50]       Precautions:                        ASSESSMENT :   DEFICITS/IMPAIRMENTS:   The patient is limited by decreased functional mobility, independence in ADLs, high-level IADLs, ROM, strength, body mechanics, activity tolerance, endurance, safety awareness, cognition, command following, attention/concentration, coordination, balance     Based on the impairments listed above patient presents with difficulty with ambulation. Patient recent discharge from rehab to home has care giver 8 to 5 except Thursdays daughter stays 
  Problem: Physical Therapy - Adult  Goal: By Discharge: Performs mobility at highest level of function for planned discharge setting.  See evaluation for individualized goals.  Description: FUNCTIONAL STATUS PRIOR TO ADMISSION: Patient was modified independent using a rolling walker for functional mobility.    HOME SUPPORT PRIOR TO ADMISSION: The patient lived with daughter has care giver 8 to 5 except Thursday daughter with the patient when care giver not there.    Physical Therapy Goals  Initiated 1/9/2024  1.  Patient will move from supine to sit and sit to supine, scoot up and down, and roll side to side in bed with contact guard assist within 7 day(s).    2.  Patient will perform sit to stand with contact guard assist within 7 day(s).  3.  Patient will transfer from bed to chair and chair to bed with contact guard assist using the least restrictive device within 7 day(s).  4.  Patient will ambulate with minimal assistance for 100 feet with the least restrictive device within 7 day(s).     Outcome: Progressing   PHYSICAL THERAPY TREATMENT    Patient: Sujata Ernandez (78 y.o. female)  Date: 1/10/2024  Diagnosis: Fall, initial encounter [W19.XXXA]  Closed fracture of left pubis, initial encounter ([facility]) [S32.502A]  Closed fracture of left pubis, unspecified portion of pubis, initial encounter ([facility]) [S32.502A]  Acute midline low back pain, unspecified whether sciatica present [M54.50] Closed fracture of left pubis, initial encounter ([facility])      Precautions:                      ASSESSMENT:  Patient continues to benefit from skilled PT services and is progressing towards goals. Communicated with nurse cleared for therapy and has been medicated for pain. Patient supine on bed when received, care giver at bedside agreed with all goals set for the patient. Care giver said right knee still very swollen and knee brace will not be able to put on. Right buckle without the knee brace,tolerated donning of cam walker 
  Problem: Physical Therapy - Adult  Goal: By Discharge: Performs mobility at highest level of function for planned discharge setting.  See evaluation for individualized goals.  Description: FUNCTIONAL STATUS PRIOR TO ADMISSION: Patient was modified independent using a rolling walker for functional mobility.    HOME SUPPORT PRIOR TO ADMISSION: The patient lived with daughter has care giver 8 to 5 except Thursday daughter with the patient when care giver not there.    Physical Therapy Goals  Initiated 1/9/2024  1.  Patient will move from supine to sit and sit to supine, scoot up and down, and roll side to side in bed with contact guard assist within 7 day(s).    2.  Patient will perform sit to stand with contact guard assist within 7 day(s).  3.  Patient will transfer from bed to chair and chair to bed with contact guard assist using the least restrictive device within 7 day(s).  4.  Patient will ambulate with minimal assistance for 100 feet with the least restrictive device within 7 day(s).     Outcome: Progressing   PHYSICAL THERAPY TREATMENT    Patient: Sujata Ernandez (78 y.o. female)  Date: 1/13/2024  Diagnosis: Fall, initial encounter [W19.XXXA]  Closed fracture of left pubis, initial encounter ([facility]) [S32.502A]  Closed fracture of left pubis, unspecified portion of pubis, initial encounter ([facility]) [S32.502A]  Acute midline low back pain, unspecified whether sciatica present [M54.50] Closed fracture of left pubis, initial encounter ([facility])      Precautions:                      ASSESSMENT:  Patient continues to benefit from skilled PT services and is slowly progressing towards goals. Cam boot placed on Right LE prior to mobilizing. Patient today more alert at beginning of session and eager to participate.  Patient still requires 2 person assist for bed mobility.  Once at edge of bed, use of alaina steady for sit-stand transfer in preparation for transfer to chair.  Once in alaina steady patient in a extreme 
  Problem: Physical Therapy - Adult  Goal: By Discharge: Performs mobility at highest level of function for planned discharge setting.  See evaluation for individualized goals.  Description: FUNCTIONAL STATUS PRIOR TO ADMISSION: Patient was modified independent using a rolling walker for functional mobility.    HOME SUPPORT PRIOR TO ADMISSION: The patient lived with daughter has care giver 8 to 5 except Thursday daughter with the patient when care giver not there.    Physical Therapy Goals  Initiated 1/9/2024  1.  Patient will move from supine to sit and sit to supine, scoot up and down, and roll side to side in bed with contact guard assist within 7 day(s).    2.  Patient will perform sit to stand with contact guard assist within 7 day(s).  3.  Patient will transfer from bed to chair and chair to bed with contact guard assist using the least restrictive device within 7 day(s).  4.  Patient will ambulate with minimal assistance for 100 feet with the least restrictive device within 7 day(s).     Outcome: Progressing   PHYSICAL THERAPY TREATMENT    Patient: Sujata Ernandez (78 y.o. female)  Date: 1/15/2024  Diagnosis: Fall, initial encounter [W19.XXXA]  Closed fracture of left pubis, initial encounter ([facility]) [S32.502A]  Closed fracture of left pubis, unspecified portion of pubis, initial encounter ([facility]) [S32.502A]  Acute midline low back pain, unspecified whether sciatica present [M54.50] Closed fracture of left pubis, initial encounter ([facility])      Precautions:                      ASSESSMENT:  Patient continues to benefit from skilled PT services and is slowly progressing towards goals. She continues to demonstrates significant fear of falling and self limiting behaviors secondary to confusion and pain. She demonstrates good command following and transitions supine to sit via log roll technique. Patient is able to stand x1 with Max Ax2 at Rw. She maintains standing for 3-5 seconds demonstrating almost full 
continuous positive airway pressure, respiratory therapy assess nares and determine need for appliance change or resting period.  Outcome: Progressing     
performed multiple sit<>stands with increased need for assist with fatigue. Assisted back to bed at end of session, pt quickly asleep         PLAN:  Patient continues to benefit from skilled intervention to address the above impairments.  Continue treatment per established plan of care.    Recommendation for discharge: (in order for the patient to meet his/her long term goals): Therapy up to 5 days/week in Skilled nursing facility    Other factors to consider for discharge: patient's current support system is unable to meet their requirements for physical assistance, poor safety awareness, impaired cognition, high risk for falls, not safe to be alone, and concern for safely navigating or managing the home environment    IF patient discharges home will need the following DME: continuing to assess with progress       SUBJECTIVE:   Patient stated, \"I need the bathroom.\"    OBJECTIVE DATA SUMMARY:   Critical Behavior:          Functional Mobility Training:  Bed Mobility:  Bed Mobility Training  Sit to Supine: Maximum assistance;Assist X2  Transfers:  Transfer Training  Interventions: Safety awareness training;Weight shifting training/pressure relief;Verbal cues  Sit to Stand: Moderate assistance;Assist X2  Stand to Sit: Moderate assistance;Assist X2 (control descend)  Stand Pivot Transfers: Total assistance  Toilet Transfer: Total assistance  Balance:  Balance  Sitting: Impaired  Sitting - Static: Fair (occasional)  Sitting - Dynamic: Fair (occasional)  Standing: Impaired  Standing - Static: Constant support  Standing - Dynamic: Not tested   Ambulation/Gait Training:        Neuro Re-Education:                    Pain Rating:  -/10   Pain Intervention(s):   rest    Activity Tolerance:   requires rest breaks    After treatment:   Patient left in no apparent distress in bed, Call bell within reach, Bed/ chair alarm activated, and Caregiver / family present      COMMUNICATION/EDUCATION:   The patient's plan of care was 
was present for session reports pt wear boot on R foot and R knee brace following a previous injury, unable to don R knee brace at this time due to edema. Pt transferred supine>sit with max A x2, demo'd impaired balance and tolerated approx 1 minute due to hip pain. She was returned to bed with max A x2,needs met and VSS. At this time pt is functioning below her baseline and will benefit from continued therapy to address the above impairments. Recommend rehab at discharge to improve functional performance and safety.      Functional Outcome Measure:  The patient scored 15/100 on the Barthel Index outcome measure.         PLAN :  Recommendations and Planned Interventions:   self care training, therapeutic activities, functional mobility training, balance training, therapeutic exercise, endurance activities, patient education, home safety training, and family training/education    Frequency/Duration: OT Plan of Care: 4 times/week    Recommendation for discharge: (in order for the patient to meet his/her long term goals): Therapy up to 5 days/week in rehab facility    Other factors to consider for discharge: patient's current support system is unable to meet their requirements for physical assistance, impaired cognition, high risk for falls, and concern for safely navigating or managing the home environment    IF patient discharges home will need the following DME: continuing to assess with progress       SUBJECTIVE:   Patient stated, “I can try.”  OBJECTIVE DATA SUMMARY:     Past Medical History:   Diagnosis Date    Aortic stenosis     Hypercholesterolemia     Neuropathy     Idiopathic peripheral    Skin problem     TIA (transient ischemic attack) 09/2022     Past Surgical History:   Procedure Laterality Date    AORTIC VALVE REPLACEMENT  11/30/2018    TAVR    BLADDER REPAIR  07/24/2018    Unspecified procedure on bladder    BLADDER SUSPENSION  06/2011    Mesh bladder suspension for pelvic prolapse    CHOLECYSTECTOMY

## 2024-02-07 ENCOUNTER — TELEPHONE (OUTPATIENT)
Facility: CLINIC | Age: 79
End: 2024-02-07

## 2024-02-07 NOTE — TELEPHONE ENCOUNTER
[facility] called in regards to pt being discharged from [facility] today. Has been ordered some home health services. Calling to get verbal confirmation from PCP to see If she will follow

## 2024-02-07 NOTE — TELEPHONE ENCOUNTER
Left detailed message advising home health that patient is coming in on Friday and paperwork will be signed then.

## 2024-02-09 ENCOUNTER — TELEMEDICINE (OUTPATIENT)
Facility: CLINIC | Age: 79
End: 2024-02-09

## 2024-02-09 DIAGNOSIS — R41.0 CONFUSION: ICD-10-CM

## 2024-02-09 DIAGNOSIS — S32.502D CLOSED FRACTURE OF LEFT PUBIS WITH ROUTINE HEALING, UNSPECIFIED PORTION OF PUBIS, SUBSEQUENT ENCOUNTER: Primary | ICD-10-CM

## 2024-02-09 DIAGNOSIS — N39.0 RECURRENT UTI: ICD-10-CM

## 2024-02-09 DIAGNOSIS — G93.40 ENCEPHALOPATHY: ICD-10-CM

## 2024-02-09 DIAGNOSIS — I89.0 LYMPHEDEMA: ICD-10-CM

## 2024-02-09 DIAGNOSIS — R41.0 DELIRIUM: ICD-10-CM

## 2024-02-09 DIAGNOSIS — E53.8 B12 DEFICIENCY: ICD-10-CM

## 2024-02-09 DIAGNOSIS — I10 ESSENTIAL (PRIMARY) HYPERTENSION: ICD-10-CM

## 2024-02-09 DIAGNOSIS — N39.45 CONTINUOUS LEAKAGE OF URINE: ICD-10-CM

## 2024-02-09 DIAGNOSIS — Z09 HOSPITAL DISCHARGE FOLLOW-UP: ICD-10-CM

## 2024-02-09 DIAGNOSIS — G90.09 IDIOPATHIC PERIPHERAL AUTONOMIC NEUROPATHY: ICD-10-CM

## 2024-02-09 DIAGNOSIS — E83.42 HYPOMAGNESEMIA: ICD-10-CM

## 2024-02-09 DIAGNOSIS — N39.0 BACTERIAL UTI: ICD-10-CM

## 2024-02-09 DIAGNOSIS — I71.43 INFRARENAL ABDOMINAL AORTIC ANEURYSM (AAA) WITHOUT RUPTURE (HCC): ICD-10-CM

## 2024-02-09 DIAGNOSIS — A49.9 BACTERIAL UTI: ICD-10-CM

## 2024-02-09 DIAGNOSIS — D53.9 MACROCYTIC ANEMIA: ICD-10-CM

## 2024-02-09 DIAGNOSIS — Z91.81 HISTORY OF FALL: ICD-10-CM

## 2024-02-09 RX ORDER — BACLOFEN 5 MG/1
TABLET ORAL
COMMUNITY
Start: 2024-01-17

## 2024-02-09 RX ORDER — LANOLIN ALCOHOL/MO/W.PET/CERES
400 CREAM (GRAM) TOPICAL 2 TIMES DAILY
COMMUNITY

## 2024-02-09 RX ORDER — POTASSIUM CHLORIDE 1500 MG/1
TABLET, EXTENDED RELEASE ORAL
COMMUNITY
Start: 2024-01-04

## 2024-02-09 RX ORDER — CARVEDILOL 25 MG/1
25 TABLET ORAL 2 TIMES DAILY WITH MEALS
COMMUNITY

## 2024-02-09 RX ORDER — GABAPENTIN 600 MG/1
600 TABLET ORAL
COMMUNITY

## 2024-02-09 ASSESSMENT — PATIENT HEALTH QUESTIONNAIRE - PHQ9
SUM OF ALL RESPONSES TO PHQ QUESTIONS 1-9: 0
SUM OF ALL RESPONSES TO PHQ QUESTIONS 1-9: 0
SUM OF ALL RESPONSES TO PHQ9 QUESTIONS 1 & 2: 0
2. FEELING DOWN, DEPRESSED OR HOPELESS: 0
SUM OF ALL RESPONSES TO PHQ QUESTIONS 1-9: 0
1. LITTLE INTEREST OR PLEASURE IN DOING THINGS: 0
SUM OF ALL RESPONSES TO PHQ QUESTIONS 1-9: 0

## 2024-02-09 NOTE — PROGRESS NOTES
Chief Complaint   Patient presents with    Follow-up     Hospital follow up     1. Have you been to the ER, urgent care clinic since your last visit?  Hospitalized since your last visit?No    2. Have you seen or consulted any other health care providers outside of the [facility] since your last visit?   No     3. For patients aged 45-75: Has the patient had a colonoscopy / FIT/ Cologuard? NA - based on age/sex    If the patient is female:    4. For patients aged 40-74: Has the patient had a mammogram within the past 2 years?  NA - based on age/sex      5. For patients aged 21-65: Has the patient had a pap smear?  NA - based on age/sexPHQ-9 Total Score: 0 (2/9/2024 10:38 AM)     
pertinent observable physical exam findings:-              Assessment & Plan:   Sujata was seen today for follow-up.    Diagnoses and all orders for this visit:    Closed fracture of left pubis with routine healing, unspecified portion of pubis, subsequent encounter  Doing well since discharge. Reminded to schedule orthopedics and cardiology appointments. Pain is well controlled with current regiment.     History of fall  Will order fall mat as requested. Following with home health.    Encephalopathy  Secondary to UTI.  Symptoms resolved prior to hospital discharge.    Confusion  Secondary to encephalopathy.    Delirium  Secondary to encephalopathy.    Bacterial UTI  Treated with Rocephin during hospitalization.  Reported to have received doxycycline at [facility]. Sounds like culture did not support this medication and an additional antibiotic was to be prescribed.  Daughter is awaiting this prescription today and advised to call me if antibiotics are not received from [facility] today.    Macrocytic anemia  -     CBC  CBC low during hospitalization. Rechecking with labs next week.     B12 deficiency  -     Vitamin B12  Lab Results   Component Value Date    [id number] 160 (L) 01/08/2024   Taking daily supplement. Rechecking B12 with labs next week.     Hypomagnesemia  -     Magnesium  Low throughout hospitalization.  Discharged home with prescription for magnesium x 5 days.  Daughter is not aware if this was given throughout her stay at [facility] but she did receive a prescription for it.  Advised to start prescription and recheck with labs next week.    Infrarenal abdominal aortic aneurysm (AAA) without rupture (HCC)  Incidental finding.  Recommendation is ultrasound follow-up every 12 months.    Essential (primary) hypertension  -     Comprehensive Metabolic Panel  Continue carvedilol and hold off on lisinopril for now.  Check home BP readings 1-2 times daily through the weekend.  Check blood

## 2024-02-09 NOTE — PROGRESS NOTES
Please call home health and give a verbal order for skilled nursing and wound care. Wound care is for bilateral lower extremity lymphedema.  Please also order a PureWick external cath system. Diagnosis recurrent falls, recurrent UTI, urinary incontinence. And a fall mat. Diagnosis recurrent falls, high fall risk, history of fibula fracture, closed fracture of left pubis, and frail elderly.   Please let me know if you need anything additional.

## 2024-02-12 ENCOUNTER — TELEPHONE (OUTPATIENT)
Facility: CLINIC | Age: 79
End: 2024-02-12

## 2024-02-12 DIAGNOSIS — N30.00 ACUTE CYSTITIS WITHOUT HEMATURIA: Primary | ICD-10-CM

## 2024-02-12 NOTE — TELEPHONE ENCOUNTER
Patients daughter (Valarie) called and spoke with me regarding multiple things. First talked about UTI medication that was given from the former facility. Patient was prescribed cefuroxime 500 mg which has called uncontrolled diarrhea which has resulted in patient having to be bathed multiple times and still having UTI symptoms. Daughter is asking if there is something else that can be sent in for that. Also asked if other sister (Rosalee Ernandez) were to bring in FMLA forms if another appointment would be needed or would the appointment from February 9th be sufficient enough?

## 2024-02-13 ENCOUNTER — NURSE ONLY (OUTPATIENT)
Facility: CLINIC | Age: 79
End: 2024-02-13
Payer: MEDICARE

## 2024-02-13 ENCOUNTER — TELEPHONE (OUTPATIENT)
Facility: CLINIC | Age: 79
End: 2024-02-13

## 2024-02-13 DIAGNOSIS — R41.0 CONFUSION: Primary | ICD-10-CM

## 2024-02-13 LAB
BILIRUBIN, URINE, POC: NEGATIVE
BLOOD URINE, POC: NEGATIVE
GLUCOSE URINE, POC: NEGATIVE
KETONES, URINE, POC: NEGATIVE
LEUKOCYTE ESTERASE, URINE, POC: ABNORMAL
NITRITE, URINE, POC: POSITIVE
PH, URINE, POC: 7 (ref 4.6–8)
PROTEIN,URINE, POC: ABNORMAL
SPECIFIC GRAVITY, URINE, POC: 1.02 (ref 1–1.03)
URINALYSIS CLARITY, POC: ABNORMAL
URINALYSIS COLOR, POC: YELLOW
UROBILINOGEN, POC: ABNORMAL

## 2024-02-13 PROCEDURE — 81003 URINALYSIS AUTO W/O SCOPE: CPT | Performed by: NURSE PRACTITIONER

## 2024-02-13 RX ORDER — NITROFURANTOIN 25; 75 MG/1; MG/1
100 CAPSULE ORAL 2 TIMES DAILY
Qty: 14 CAPSULE | Refills: 0 | Status: SHIPPED | OUTPATIENT
Start: 2024-02-13 | End: 2024-02-20

## 2024-02-13 NOTE — TELEPHONE ENCOUNTER
Spoke to pt's daughter she stated that her confusion has gotten worse.  She stated that her mom thinks she lives in [facility] and that she needs to flip the pages on her Ipad.  Urine sample to be obtained from patient.      FMLA paperwork in folder.

## 2024-02-13 NOTE — TELEPHONE ENCOUNTER
Left message for pt's daughter to call back.    Sample we received from patient today was enough to get a culture sample from.

## 2024-02-13 NOTE — TELEPHONE ENCOUNTER
On call note, call received 2/10/2024 at 1210- Daughter Valarie called requesting antibiotics for UTI for her mom. A prescription was not sent by [facility] on 2/9/2024. Patient was previously treated with Doxycyline for UTI. We discussed this is not a typical medication used for UTI treatment. Valarie did not have any additional information regarding urine culture results. We discussed the danger of treating UTI without culture due to high rate of resistance and previous treatment failure. At the time of call, Valarie did not have any concerns her mom may have a UTI. Valarie was agreeable to wait on antibiotics but advised to call back if she had any concerns during the weekend. Mom has lab appointment scheduled 2/13/2024. Advised we will check her dipstick and send off urine culture at that time.

## 2024-02-13 NOTE — TELEPHONE ENCOUNTER
Dipstick is consistent with likely UTI.  I have sent Macrobid to her pharmacy to take twice daily for 7 days.  She should not begin antibiotics until urine has been collected for urine culture.  This is very important due to high antibiotic resistance with UTIs.  Follow-up with urogynecology as referred.

## 2024-02-14 ENCOUNTER — TELEPHONE (OUTPATIENT)
Facility: CLINIC | Age: 79
End: 2024-02-14

## 2024-02-14 LAB
ALBUMIN SERPL-MCNC: 3.4 G/DL (ref 3.8–4.8)
ALBUMIN/GLOB SERPL: 1.4 {RATIO} (ref 1.2–2.2)
ALP SERPL-CCNC: 118 IU/L (ref 44–121)
ALT SERPL-CCNC: 18 IU/L (ref 0–32)
AST SERPL-CCNC: 27 IU/L (ref 0–40)
BILIRUB SERPL-MCNC: 0.3 MG/DL (ref 0–1.2)
BUN SERPL-MCNC: 14 MG/DL (ref 8–27)
BUN/CREAT SERPL: 21 (ref 12–28)
CALCIUM SERPL-MCNC: 8.4 MG/DL (ref 8.7–10.3)
CHLORIDE SERPL-SCNC: 102 MMOL/L (ref 96–106)
CO2 SERPL-SCNC: 22 MMOL/L (ref 20–29)
CREAT SERPL-MCNC: 0.68 MG/DL (ref 0.57–1)
EGFRCR SERPLBLD CKD-EPI 2021: 88 ML/MIN/1.73
ERYTHROCYTE [DISTWIDTH] IN BLOOD BY AUTOMATED COUNT: 13.2 % (ref 11.7–15.4)
GLOBULIN SER CALC-MCNC: 2.4 G/DL (ref 1.5–4.5)
GLUCOSE SERPL-MCNC: 210 MG/DL (ref 70–99)
HCT VFR BLD AUTO: 36.7 % (ref 34–46.6)
HGB BLD-MCNC: 12.3 G/DL (ref 11.1–15.9)
MAGNESIUM SERPL-MCNC: 2.3 MG/DL (ref 1.6–2.3)
MCH RBC QN AUTO: 32.3 PG (ref 26.6–33)
MCHC RBC AUTO-ENTMCNC: 33.5 G/DL (ref 31.5–35.7)
MCV RBC AUTO: 96 FL (ref 79–97)
PLATELET # BLD AUTO: 191 X10E3/UL (ref 150–450)
POTASSIUM SERPL-SCNC: 4.5 MMOL/L (ref 3.5–5.2)
PROT SERPL-MCNC: 5.8 G/DL (ref 6–8.5)
RBC # BLD AUTO: 3.81 X10E6/UL (ref 3.77–5.28)
SODIUM SERPL-SCNC: 145 MMOL/L (ref 134–144)
WBC # BLD AUTO: 12.8 X10E3/UL (ref 3.4–10.8)

## 2024-02-14 NOTE — TELEPHONE ENCOUNTER
Daughter-Rosalee called and updated on patient's condition. Started Microbid last night and had one dose this am, since starting the medication, patient has had diarrhea several days last night and today. Patient is incont of bladder and bowels. Daughter stopped abx and wanted to know if there is any other abx that patient could take. Did let daughter know that office will follow up tomorrow with further directions.

## 2024-02-15 LAB — SPECIMEN STATUS REPORT: NORMAL

## 2024-02-15 NOTE — TELEPHONE ENCOUNTER
Spoke with Rosalee who advised me to call Valarie. Called and spoke with Valarie.  After speaking to me on 2/10/2024 a prescription for cefuroxime was sent to pharmacy by [facility].  She took the medication Saturday night and Sunday morning and by Sunday afternoon had 4 episodes of diarrhea.  They stopped the antibiotic and her symptoms resolved.  She then started Macrobid for positive dipstick findings here on Tuesday.  She received Macrobid Tuesday night and Wednesday morning and developed diarrhea Wednesday afternoon.  She had 1 episode Wednesday afternoon and 1 episode overnight last night.  Diarrhea is reported to be liquid dark brown with food.      Valarie has a clear understanding of her mother's situation and we discussed the risk and benefits of treatment options at length. Valarie has her mother taking a probiotic daily. She is at a high risk for decompensation and sepsis if UTI is not treated. However diarrhea presents the risk of ongoing UTI (positive nitrites), skin breakdown, and decompensation and sepsis. Antibiotic risks include GI side effects including diarrhea. Urine culture is positive for UTI but susceptibility remains pending.  Switching to a different antibiotic before susceptibility is received increases her risk for diarrhea and resistance. [facility] and received 2 doses each of cefuroxime and Macrobid.  Treatment options discussed:  Continue Macrobid for UTI until susceptibility results are received and check stool now for infection specifically C diff. Valarie understands this will likely cause diarrhea and associated risks. We discussed her risk for C. difficile including advanced age, recent hospitalization, and recent treatment with multiple antibiotics.  Stop antibiotics until susceptibility results are received to allow her GI tract time to rest. However, there is a high risk for

## 2024-02-16 PROBLEM — R73.03 PREDIABETES: Status: ACTIVE | Noted: 2024-02-16

## 2024-02-16 LAB — HBA1C MFR BLD: 5.8 % (ref 4.8–5.6)

## 2024-02-16 NOTE — TELEPHONE ENCOUNTER
Spoke to pt's daughter to get an update.  She stated that they are going to try to get through this at home.  She said that she knows taking her to the hospital would be easier on them but she does not want to risk the increased stress and confusion that it would have on her mother.  She said that she is going to give her a dose of imodium to maybe help with the diarrhea that the antibiotics cause to see if they can make it through this UTI.  Daughter wants to know that if she is able to complete this round of antibiotics do we need to get a repeat urine sample?

## 2024-02-17 LAB
BACTERIA UR CULT: ABNORMAL

## 2024-02-18 DIAGNOSIS — N30.00 ACUTE CYSTITIS WITHOUT HEMATURIA: Primary | ICD-10-CM

## 2024-02-18 RX ORDER — NITROFURANTOIN 25; 75 MG/1; MG/1
100 CAPSULE ORAL 2 TIMES DAILY
Qty: 4 CAPSULE | Refills: 0 | Status: SHIPPED | OUTPATIENT
Start: 2024-02-18 | End: 2024-02-20

## 2024-02-18 RX ORDER — CIPROFLOXACIN 250 MG/1
250 TABLET, FILM COATED ORAL 2 TIMES DAILY
Qty: 10 TABLET | Refills: 0 | Status: SHIPPED | OUTPATIENT
Start: 2024-02-18 | End: 2024-02-23

## 2024-02-19 ENCOUNTER — NURSE ONLY (OUTPATIENT)
Facility: CLINIC | Age: 79
End: 2024-02-19

## 2024-02-19 DIAGNOSIS — R19.7 DIARRHEA, UNSPECIFIED TYPE: Primary | ICD-10-CM

## 2024-02-21 LAB
C DIFF TOX A+B STL QL IA: NEGATIVE
G LAMBLIA AG STL QL IA: NEGATIVE

## 2024-02-24 ENCOUNTER — HOSPITAL ENCOUNTER (EMERGENCY)
Facility: HOSPITAL | Age: 79
Discharge: HOME | DRG: 640 | End: 2024-02-27
Payer: MEDICARE

## 2024-02-24 ENCOUNTER — APPOINTMENT (OUTPATIENT)
Facility: HOSPITAL | Age: 79
DRG: 640 | End: 2024-02-24
Payer: MEDICARE

## 2024-02-24 ENCOUNTER — HOSPITAL ENCOUNTER (INPATIENT)
Facility: HOSPITAL | Age: 79
LOS: 3 days | Discharge: HOME HEALTH | DRG: 640 | End: 2024-02-27
Attending: EMERGENCY MEDICINE | Admitting: INTERNAL MEDICINE
Payer: MEDICARE

## 2024-02-24 DIAGNOSIS — G93.40 ENCEPHALOPATHY: Primary | ICD-10-CM

## 2024-02-24 DIAGNOSIS — I95.9 HYPOTENSION, UNSPECIFIED HYPOTENSION TYPE: ICD-10-CM

## 2024-02-24 PROBLEM — A41.9 SEPTIC SHOCK (HCC): Status: ACTIVE | Noted: 2024-02-24

## 2024-02-24 PROBLEM — R65.21 SEPTIC SHOCK (HCC): Status: ACTIVE | Noted: 2024-02-24

## 2024-02-24 LAB
ALBUMIN SERPL-MCNC: 2 G/DL (ref 3.5–5)
ALBUMIN/GLOB SERPL: 0.7 (ref 1.1–2.2)
ALP SERPL-CCNC: 85 U/L (ref 45–117)
ALT SERPL-CCNC: 15 U/L (ref 12–78)
ANION GAP SERPL CALC-SCNC: 3 MMOL/L (ref 5–15)
APPEARANCE UR: CLEAR
AST SERPL-CCNC: 14 U/L (ref 15–37)
BACTERIA URNS QL MICRO: NEGATIVE /HPF
BASOPHILS # BLD: 0 K/UL (ref 0–0.1)
BASOPHILS NFR BLD: 0 % (ref 0–1)
BILIRUB SERPL-MCNC: 0.4 MG/DL (ref 0.2–1)
BILIRUB UR QL: NEGATIVE
BUN SERPL-MCNC: 13 MG/DL (ref 6–20)
BUN/CREAT SERPL: 20 (ref 12–20)
CALCIUM SERPL-MCNC: 7.9 MG/DL (ref 8.5–10.1)
CAMPYLOBACTER STL CULT: NORMAL
CHLORIDE SERPL-SCNC: 101 MMOL/L (ref 97–108)
CO2 SERPL-SCNC: 32 MMOL/L (ref 21–32)
COLOR UR: ABNORMAL
CREAT SERPL-MCNC: 0.65 MG/DL (ref 0.55–1.02)
DIFFERENTIAL METHOD BLD: ABNORMAL
E COLI SXT STL QL IA: NEGATIVE
EOSINOPHIL # BLD: 0.1 K/UL (ref 0–0.4)
EOSINOPHIL NFR BLD: 2 % (ref 0–7)
EPITH CASTS URNS QL MICRO: ABNORMAL /LPF
ERYTHROCYTE [DISTWIDTH] IN BLOOD BY AUTOMATED COUNT: 14.7 % (ref 11.5–14.5)
GLOBULIN SER CALC-MCNC: 2.9 G/DL (ref 2–4)
GLUCOSE BLD STRIP.AUTO-MCNC: 112 MG/DL (ref 65–117)
GLUCOSE SERPL-MCNC: 108 MG/DL (ref 65–100)
GLUCOSE UR STRIP.AUTO-MCNC: NEGATIVE MG/DL
HCT VFR BLD AUTO: 33.3 % (ref 35–47)
HGB BLD-MCNC: 10.8 G/DL (ref 11.5–16)
HGB UR QL STRIP: ABNORMAL
IMM GRANULOCYTES # BLD AUTO: 0.1 K/UL (ref 0–0.04)
IMM GRANULOCYTES NFR BLD AUTO: 1 % (ref 0–0.5)
INR PPP: 1.1 (ref 0.9–1.1)
KETONES UR QL STRIP.AUTO: NEGATIVE MG/DL
LACTATE BLD-SCNC: 1.27 MMOL/L (ref 0.4–2)
LEUKOCYTE ESTERASE UR QL STRIP.AUTO: ABNORMAL
LYMPHOCYTES # BLD: 1.2 K/UL (ref 0.8–3.5)
LYMPHOCYTES NFR BLD: 19 % (ref 12–49)
MAGNESIUM SERPL-MCNC: 1.8 MG/DL (ref 1.6–2.4)
MCH RBC QN AUTO: 32.2 PG (ref 26–34)
MCHC RBC AUTO-ENTMCNC: 32.4 G/DL (ref 30–36.5)
MCV RBC AUTO: 99.4 FL (ref 80–99)
MONOCYTES # BLD: 0.5 K/UL (ref 0–1)
MONOCYTES NFR BLD: 7 % (ref 5–13)
NEUTS SEG # BLD: 4.4 K/UL (ref 1.8–8)
NEUTS SEG NFR BLD: 71 % (ref 32–75)
NITRITE UR QL STRIP.AUTO: NEGATIVE
NRBC # BLD: 0 K/UL (ref 0–0.01)
NRBC BLD-RTO: 0 PER 100 WBC
PH UR STRIP: 7.5 (ref 5–8)
PLATELET # BLD AUTO: 116 K/UL (ref 150–400)
PMV BLD AUTO: 9.6 FL (ref 8.9–12.9)
POTASSIUM SERPL-SCNC: 3 MMOL/L (ref 3.5–5.1)
PROCALCITONIN SERPL-MCNC: 0.11 NG/ML
PROT SERPL-MCNC: 4.9 G/DL (ref 6.4–8.2)
PROT UR STRIP-MCNC: NEGATIVE MG/DL
PROTHROMBIN TIME: 11.8 SEC (ref 9–11.1)
RBC # BLD AUTO: 3.35 M/UL (ref 3.8–5.2)
RBC #/AREA URNS HPF: ABNORMAL /HPF (ref 0–5)
SALM + SHIG STL CULT: NORMAL
SERVICE CMNT-IMP: NORMAL
SODIUM SERPL-SCNC: 136 MMOL/L (ref 136–145)
SP GR UR REFRACTOMETRY: 1.03 (ref 1–1.03)
TROPONIN I SERPL HS-MCNC: 25 NG/L (ref 0–51)
TSH SERPL DL<=0.05 MIU/L-ACNC: 1.44 UIU/ML (ref 0.36–3.74)
URINE CULTURE IF INDICATED: ABNORMAL
UROBILINOGEN UR QL STRIP.AUTO: 0.2 EU/DL (ref 0.2–1)
WBC # BLD AUTO: 6.3 K/UL (ref 3.6–11)
WBC URNS QL MICRO: ABNORMAL /HPF (ref 0–4)

## 2024-02-24 PROCEDURE — 4A03X5D MEASUREMENT OF ARTERIAL FLOW, INTRACRANIAL, EXTERNAL APPROACH: ICD-10-PCS | Performed by: RADIOLOGY

## 2024-02-24 PROCEDURE — 87209 SMEAR COMPLEX STAIN: CPT

## 2024-02-24 PROCEDURE — 85025 COMPLETE CBC W/AUTO DIFF WBC: CPT

## 2024-02-24 PROCEDURE — 82140 ASSAY OF AMMONIA: CPT

## 2024-02-24 PROCEDURE — 94761 N-INVAS EAR/PLS OXIMETRY MLT: CPT

## 2024-02-24 PROCEDURE — 87506 IADNA-DNA/RNA PROBE TQ 6-11: CPT

## 2024-02-24 PROCEDURE — 71045 X-RAY EXAM CHEST 1 VIEW: CPT

## 2024-02-24 PROCEDURE — 85610 PROTHROMBIN TIME: CPT

## 2024-02-24 PROCEDURE — 6360000004 HC RX CONTRAST MEDICATION: Performed by: EMERGENCY MEDICINE

## 2024-02-24 PROCEDURE — 82962 GLUCOSE BLOOD TEST: CPT

## 2024-02-24 PROCEDURE — 1100000000 HC RM PRIVATE

## 2024-02-24 PROCEDURE — 84443 ASSAY THYROID STIM HORMONE: CPT

## 2024-02-24 PROCEDURE — 84145 PROCALCITONIN (PCT): CPT

## 2024-02-24 PROCEDURE — 87177 OVA AND PARASITES SMEARS: CPT

## 2024-02-24 PROCEDURE — 2580000003 HC RX 258: Performed by: EMERGENCY MEDICINE

## 2024-02-24 PROCEDURE — 87449 NOS EACH ORGANISM AG IA: CPT

## 2024-02-24 PROCEDURE — 99285 EMERGENCY DEPT VISIT HI MDM: CPT

## 2024-02-24 PROCEDURE — 70450 CT HEAD/BRAIN W/O DYE: CPT

## 2024-02-24 PROCEDURE — 0042T CT BRAIN PERFUSION: CPT

## 2024-02-24 PROCEDURE — 6360000002 HC RX W HCPCS: Performed by: INTERNAL MEDICINE

## 2024-02-24 PROCEDURE — 87086 URINE CULTURE/COLONY COUNT: CPT

## 2024-02-24 PROCEDURE — 83735 ASSAY OF MAGNESIUM: CPT

## 2024-02-24 PROCEDURE — 93005 ELECTROCARDIOGRAM TRACING: CPT | Performed by: EMERGENCY MEDICINE

## 2024-02-24 PROCEDURE — 80053 COMPREHEN METABOLIC PANEL: CPT

## 2024-02-24 PROCEDURE — 81001 URINALYSIS AUTO W/SCOPE: CPT

## 2024-02-24 PROCEDURE — 87040 BLOOD CULTURE FOR BACTERIA: CPT

## 2024-02-24 PROCEDURE — 70496 CT ANGIOGRAPHY HEAD: CPT

## 2024-02-24 PROCEDURE — 2500000003 HC RX 250 WO HCPCS: Performed by: EMERGENCY MEDICINE

## 2024-02-24 PROCEDURE — 2580000003 HC RX 258: Performed by: INTERNAL MEDICINE

## 2024-02-24 PROCEDURE — 87324 CLOSTRIDIUM AG IA: CPT

## 2024-02-24 PROCEDURE — 83605 ASSAY OF LACTIC ACID: CPT

## 2024-02-24 PROCEDURE — 84484 ASSAY OF TROPONIN QUANT: CPT

## 2024-02-24 PROCEDURE — 36415 COLL VENOUS BLD VENIPUNCTURE: CPT

## 2024-02-24 RX ORDER — POLYETHYLENE GLYCOL 3350 17 G/17G
17 POWDER, FOR SOLUTION ORAL DAILY PRN
Status: DISCONTINUED | OUTPATIENT
Start: 2024-02-24 | End: 2024-02-27 | Stop reason: HOSPADM

## 2024-02-24 RX ORDER — MAGNESIUM SULFATE IN WATER 40 MG/ML
2000 INJECTION, SOLUTION INTRAVENOUS PRN
Status: DISCONTINUED | OUTPATIENT
Start: 2024-02-24 | End: 2024-02-27 | Stop reason: HOSPADM

## 2024-02-24 RX ORDER — LACTOBACILLUS RHAMNOSUS GG 10B CELL
1 CAPSULE ORAL
Status: DISCONTINUED | OUTPATIENT
Start: 2024-02-25 | End: 2024-02-27 | Stop reason: HOSPADM

## 2024-02-24 RX ORDER — NOREPINEPHRINE BITARTRATE 0.06 MG/ML
.5-2 INJECTION, SOLUTION INTRAVENOUS CONTINUOUS
Status: DISCONTINUED | OUTPATIENT
Start: 2024-02-24 | End: 2024-02-25

## 2024-02-24 RX ORDER — ONDANSETRON 4 MG/1
4 TABLET, ORALLY DISINTEGRATING ORAL EVERY 8 HOURS PRN
Status: DISCONTINUED | OUTPATIENT
Start: 2024-02-24 | End: 2024-02-27 | Stop reason: HOSPADM

## 2024-02-24 RX ORDER — ACETAMINOPHEN 650 MG/1
650 SUPPOSITORY RECTAL EVERY 6 HOURS PRN
Status: DISCONTINUED | OUTPATIENT
Start: 2024-02-24 | End: 2024-02-25

## 2024-02-24 RX ORDER — ENOXAPARIN SODIUM 100 MG/ML
40 INJECTION SUBCUTANEOUS DAILY
Status: DISCONTINUED | OUTPATIENT
Start: 2024-02-24 | End: 2024-02-27 | Stop reason: HOSPADM

## 2024-02-24 RX ORDER — POTASSIUM CHLORIDE 7.45 MG/ML
10 INJECTION INTRAVENOUS PRN
Status: DISCONTINUED | OUTPATIENT
Start: 2024-02-24 | End: 2024-02-27 | Stop reason: HOSPADM

## 2024-02-24 RX ORDER — ONDANSETRON 2 MG/ML
4 INJECTION INTRAMUSCULAR; INTRAVENOUS EVERY 6 HOURS PRN
Status: DISCONTINUED | OUTPATIENT
Start: 2024-02-24 | End: 2024-02-27 | Stop reason: HOSPADM

## 2024-02-24 RX ORDER — ACETAMINOPHEN 325 MG/1
650 TABLET ORAL EVERY 6 HOURS PRN
Status: DISCONTINUED | OUTPATIENT
Start: 2024-02-24 | End: 2024-02-27 | Stop reason: HOSPADM

## 2024-02-24 RX ORDER — POTASSIUM CHLORIDE 29.8 MG/ML
20 INJECTION INTRAVENOUS PRN
Status: DISCONTINUED | OUTPATIENT
Start: 2024-02-24 | End: 2024-02-27 | Stop reason: HOSPADM

## 2024-02-24 RX ORDER — POTASSIUM CHLORIDE 7.45 MG/ML
10 INJECTION INTRAVENOUS
Status: DISPENSED | OUTPATIENT
Start: 2024-02-24 | End: 2024-02-24

## 2024-02-24 RX ORDER — METRONIDAZOLE 500 MG/100ML
500 INJECTION, SOLUTION INTRAVENOUS EVERY 8 HOURS
Status: DISCONTINUED | OUTPATIENT
Start: 2024-02-24 | End: 2024-02-24

## 2024-02-24 RX ORDER — 0.9 % SODIUM CHLORIDE 0.9 %
500 INTRAVENOUS SOLUTION INTRAVENOUS ONCE
Status: DISCONTINUED | OUTPATIENT
Start: 2024-02-24 | End: 2024-02-27 | Stop reason: HOSPADM

## 2024-02-24 RX ORDER — SODIUM CHLORIDE 0.9 % (FLUSH) 0.9 %
5-40 SYRINGE (ML) INJECTION EVERY 12 HOURS SCHEDULED
Status: DISCONTINUED | OUTPATIENT
Start: 2024-02-24 | End: 2024-02-27 | Stop reason: HOSPADM

## 2024-02-24 RX ORDER — 0.9 % SODIUM CHLORIDE 0.9 %
1000 INTRAVENOUS SOLUTION INTRAVENOUS ONCE
Status: COMPLETED | OUTPATIENT
Start: 2024-02-24 | End: 2024-02-24

## 2024-02-24 RX ORDER — SODIUM CHLORIDE 0.9 % (FLUSH) 0.9 %
5-40 SYRINGE (ML) INJECTION PRN
Status: DISCONTINUED | OUTPATIENT
Start: 2024-02-24 | End: 2024-02-27 | Stop reason: HOSPADM

## 2024-02-24 RX ORDER — METRONIDAZOLE 500 MG/100ML
500 INJECTION, SOLUTION INTRAVENOUS EVERY 8 HOURS
Status: DISCONTINUED | OUTPATIENT
Start: 2024-02-25 | End: 2024-02-25

## 2024-02-24 RX ORDER — SODIUM CHLORIDE 9 MG/ML
INJECTION, SOLUTION INTRAVENOUS PRN
Status: DISCONTINUED | OUTPATIENT
Start: 2024-02-24 | End: 2024-02-27 | Stop reason: HOSPADM

## 2024-02-24 RX ORDER — SODIUM CHLORIDE 9 MG/ML
INJECTION, SOLUTION INTRAVENOUS CONTINUOUS
Status: DISCONTINUED | OUTPATIENT
Start: 2024-02-24 | End: 2024-02-25

## 2024-02-24 RX ADMIN — POTASSIUM CHLORIDE 10 MEQ: 7.46 INJECTION, SOLUTION INTRAVENOUS at 19:45

## 2024-02-24 RX ADMIN — SODIUM CHLORIDE 4 MCG/MIN: 9 INJECTION, SOLUTION INTRAVENOUS at 16:41

## 2024-02-24 RX ADMIN — VANCOMYCIN HYDROCHLORIDE 2000 MG: 10 INJECTION, POWDER, LYOPHILIZED, FOR SOLUTION INTRAVENOUS at 19:29

## 2024-02-24 RX ADMIN — ENOXAPARIN SODIUM 40 MG: 100 INJECTION SUBCUTANEOUS at 17:31

## 2024-02-24 RX ADMIN — IOPAMIDOL 140 ML: 755 INJECTION, SOLUTION INTRAVENOUS at 13:31

## 2024-02-24 RX ADMIN — SODIUM CHLORIDE: 9 INJECTION, SOLUTION INTRAVENOUS at 17:23

## 2024-02-24 RX ADMIN — WATER 2000 MG: 1 INJECTION INTRAMUSCULAR; INTRAVENOUS; SUBCUTANEOUS at 17:16

## 2024-02-24 RX ADMIN — METRONIDAZOLE 500 MG: 500 INJECTION, SOLUTION INTRAVENOUS at 17:47

## 2024-02-24 RX ADMIN — POTASSIUM CHLORIDE 10 MEQ: 7.46 INJECTION, SOLUTION INTRAVENOUS at 16:57

## 2024-02-24 RX ADMIN — SODIUM CHLORIDE 1000 ML: 9 INJECTION, SOLUTION INTRAVENOUS at 14:31

## 2024-02-24 ASSESSMENT — PAIN - FUNCTIONAL ASSESSMENT: PAIN_FUNCTIONAL_ASSESSMENT: 0-10

## 2024-02-24 ASSESSMENT — PAIN SCALES - GENERAL: PAINLEVEL_OUTOF10: 0

## 2024-02-24 NOTE — ED NOTES
Dr. Mittal notified of low BP readings. No new orders at this time. Patient in no acute distress.

## 2024-02-24 NOTE — CONSULTS
HPI:   Patient seen in the ED after awakening on the morning of admission with weakness and AMS. Her BP was low on arrival. She is chronically ill/debilitated and has just completed multiple antibiotics for UTI. She resides with her daughter requiring assistance with ADLs. Code stroke was called in ED and CT scans revealed no evidence of acute stroke. Her MS improved with volume and concomitant improvement in her blood pressure and when I saw her was asking to go home. She is admitted to the ICU under the primary service of  because vasopressors were ordered but she doesn't appear to need them at this time    Past Medical History:   Diagnosis Date    Aortic stenosis     Cerebral atrophy (HCC)     Cerebral microvascular disease     Hx of completed stroke     Hypercholesterolemia     Idiopathic peripheral neuropathy     Obese     PAD (peripheral artery disease) ([facility])     Polypharmacy      Social History     Socioeconomic History    Marital status:      Spouse name: Not on file    Number of children: Not on file    Years of education: Not on file    Highest education level: Not on file   Occupational History    Not on file   Tobacco Use    Smoking status: Every Day     Current packs/day: 0.25     Types: Cigarettes    Smokeless tobacco: Never   Vaping Use    Vaping Use: Never used   Substance and Sexual Activity    Alcohol use: Never    Drug use: Never    Sexual activity: Not on file   Other Topics Concern    Not on file   Social History Narrative    Not on file     Social Determinants of Health     Financial Resource Strain: Low Risk  (11/17/2022)    Overall Financial Resource Strain (CARDIA)     Difficulty of Paying Living Expenses: Not hard at all   Food Insecurity: No Food Insecurity (2/25/2024)    Hunger Vital Sign     Worried About Running Out of Food in the Last Year: Never true     Ran Out of Food in the Last Year: Never true   Transportation Needs: No Transportation Needs (2/25/2024)    PRAPARE -

## 2024-02-24 NOTE — ED TRIAGE NOTES
Patient to ER via EMS for complaints of AMS and slurred speech at 0800 this morning.     Level 2 code stroke activated PTA. Patient taken to CT.   LWK: 0800  Blood glucose: 112  BP: 135/43  Blood thinners: plavix  Signs/ symptoms: AMS; slurred speech    Dr. Mittal at bedside at 1335 to assess patient.

## 2024-02-24 NOTE — ED PROVIDER NOTES
[facility] EMERGENCY DEPT  EMERGENCY DEPARTMENT ENCOUNTER      Pt Name: Sujata Ernandez  MRN: [id number]  Birthdate 1945  Date of evaluation: 2/24/2024  Provider: Marilynn Mittal MD    CHIEF COMPLAINT     No chief complaint on file.        HISTORY OF PRESENT ILLNESS    Suajta Hooper is an 80 yo F with history of frequent falls resulting in fractures and recurrent UTIs who this morning became altered and confused.  EMS noted that she was very somnolent with hypotension and started a fluid bolus.  She had slurred speech and generalized weakness and they initiated a pre hospital stroke alert.  She has not had fever or chills.           Additional history from independent historians:     Review of External Medical Records:     Nursing Notes were reviewed.    REVIEW OF SYSTEMS       Review of Systems    Except as noted above the remainder of the review of systems was reviewed and negative.       PAST MEDICAL HISTORY     Past Medical History:   Diagnosis Date    Aortic stenosis     Hypercholesterolemia     Neuropathy     Idiopathic peripheral    Skin problem     TIA (transient ischemic attack) 09/2022         SURGICAL HISTORY       Past Surgical History:   Procedure Laterality Date    AORTIC VALVE REPLACEMENT  11/30/2018    TAVR    BLADDER REPAIR  07/24/2018    Unspecified procedure on bladder    BLADDER SUSPENSION  06/2011    Mesh bladder suspension for pelvic prolapse    CHOLECYSTECTOMY      CHOLECYSTECTOMY  01/23/2013    COLONOSCOPY  10/10/2018    CORONARY ANGIOPLASTY WITH STENT PLACEMENT  09/25/2018    RCA    GI  2008    Open repair Zenker's diverticulum    GYN  07/24/2018    Endometrial biopsy, repair of perineum vaginal closure    KNEE ARTHROSCOPY  1958    LITHOTRIPSY Left 1987    ORTHOPEDIC SURGERY Right 06/15/2017    Right hip titanium hannah insertion after fracture    TOTAL COLECTOMY  11/09/2009    Partial colectomy due to diverticular disease         CURRENT MEDICATIONS       Previous Medications     (*)     Glucose 108 (*)     Calcium 7.9 (*)     AST 14 (*)     Total Protein 4.9 (*)     Albumin 2.0 (*)     Albumin/Globulin Ratio 0.7 (*)     All other components within normal limits   PROTIME-INR - Abnormal; Notable for the following components:    Protime 11.8 (*)     All other components within normal limits   URINALYSIS WITH REFLEX TO CULTURE - Abnormal; Notable for the following components:    Blood, Urine TRACE (*)     Leukocyte Esterase, Urine SMALL (*)     All other components within normal limits   CULTURE, BLOOD 1   CULTURE, BLOOD 2   TROPONIN   PROCALCITONIN   POC LACTIC ACID   POCT GLUCOSE   POCT GLUCOSE   POCT LACTIC ACID   POCT LACTIC ACID       All other labs were within normal range or not returned as of this dictation.    EMERGENCY DEPARTMENT COURSE and DIFFERENTIAL DIAGNOSIS/MDM:   Vitals:    Vitals:    02/24/24 1435 02/24/24 1455 02/24/24 1510 02/24/24 1520   BP: (!) 94/35 (!) 95/38 (!) 92/24 (!) 101/36   Pulse: 67 64 62 68   Resp: 15 14 17 18   Temp:       SpO2: 96% 96% 98% 94%   Weight:       Height:               Medical Decision Making  Amount and/or Complexity of Data Reviewed  Labs: ordered.  Radiology: ordered.    Risk  OTC drugs.  Prescription drug management.  Decision regarding hospitalization.            REASSESSMENT      1:26 PM  Discussed tieht Dr. Alberts, Teleneurology.  Will evaluate the patient    1:50 PM  Dr. Alberts evaluated patient and Cts with no acute findings.  PAtient is alert  and talking now.  Suspect encephalopathy rather than stroke.       CRITICAL CARE TIME   Total Critical Care time was 35 minutes, excluding separately reportable procedures.     CONSULTS:  None    PROCEDURES:  Unless otherwise noted below, none     Procedures        FINAL IMPRESSION      1. Encephalopathy    2. Hypotension, unspecified hypotension type          DISPOSITION/PLAN   DISPOSITION      Perfect Serve Consult for Admission  3:26 PM    ED Room Number: ER04/04  Patient Name and age:  Sujata Hooper

## 2024-02-24 NOTE — H&P
[facility]  [address] [place] [address] [phone]    Admission History and Physical      NAME:  Sujata Ernandez   :   1945   MRN:  [id number]     PCP:  Amanda Paulino APRN - NP     Date/Time of service:  2024  4:11 PM        Subjective:     CHIEF COMPLAINT: Altered mental status    HISTORY OF PRESENT ILLNESS:     Ms. Ernandez is a 79 y.o.  female with a past medical history of aortic stenosis status post TAVR, stroke and TIAs, hypertension, hyperlipidemia, frequent UTIs who is admitted with shock.  Ms. Ernandez is hard of hearing and majority of history is provided from daughter.  Daughter states that yesterday evening patient was confused.  She states that she noticed slurred speech, increased weakness and ongoing confusion today; therefore, she brought her to the emergency room.  Daughter also states patient did have diarrhea yesterday.  Patient denies any abdominal pain.  She denies any current urinary symptoms.  She denies any chest pain or dyspnea.  Daughter notes no fevers at home.  Daughter does state that patient has had frequent UTIs recently and recently completed cefdinir, Cipro and cephalexin for her UTIs.  Of note, patient does have lower extremity lymphadenopathy and wounds.        Allergies   Allergen Reactions    Dexlansoprazole Other (See Comments)     Other reaction(s): SEVERE DIARRHEA    Morphine      Other reaction(s): Unknown (comments)  Other reaction(s): Confusion, PERSONALITY CHANGE,NAUSEA  Other reaction(s): Confusion, Other (see comments)    Sulfa Antibiotics Swelling     Facial swelling, rash       Prior to Admission medications    Medication Sig Start Date End Date Taking? Authorizing Provider   potassium chloride (K-TAB) 20 MEQ TBCR extended release tablet  24   Juana Akhtar MD   Baclofen (LIORESAL) 5 MG tablet  24   Juana Akhtar MD   carvedilol (COREG) 25 MG tablet Take 1 tablet by  personally saw and examined the patient during this time period**  I personally spent 40 minutes in providing critical care. The reason for providing this level of medical care for this critically ill patient was due to a critical illness including shock that impaired one or more vital organ systems such that there was a high probability of imminent or life threatening deterioration in the patient's condition. This care involved high complexity decision making to assess, manipulate, and support vital system functions.                  Care Plan discussed with: Patient, Family, and Nursing Staff    Discussed:  Care Plan    Prophylaxis:  Lovenox    Probable Disposition:  SNF/LTC           ___________________________________________________    Attending Physician: Vika Smith DO

## 2024-02-25 PROBLEM — G31.9 CEREBRAL ATROPHY (HCC): Status: ACTIVE | Noted: 2024-02-25

## 2024-02-25 PROBLEM — G89.29 CHRONIC BACK PAIN: Status: ACTIVE | Noted: 2024-02-25

## 2024-02-25 PROBLEM — J20.4 PARAINFLUENZA VIRUS BRONCHITIS: Status: RESOLVED | Noted: 2021-12-23 | Resolved: 2024-02-25

## 2024-02-25 PROBLEM — E78.1 HYPERTRIGLYCERIDEMIA: Status: RESOLVED | Noted: 2020-11-18 | Resolved: 2024-02-25

## 2024-02-25 PROBLEM — F17.210 TOBACCO DEPENDENCE DUE TO CIGARETTES: Status: RESOLVED | Noted: 2022-05-25 | Resolved: 2024-02-25

## 2024-02-25 PROBLEM — Z79.899 POLYPHARMACY: Status: ACTIVE | Noted: 2024-02-25

## 2024-02-25 PROBLEM — I73.9 PAD (PERIPHERAL ARTERY DISEASE) (HCC): Status: ACTIVE | Noted: 2024-02-25

## 2024-02-25 PROBLEM — E78.00 HYPERCHOLESTEROLEMIA: Status: ACTIVE | Noted: 2024-02-25
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
## 2024-02-25 PROBLEM — G60.9 IDIOPATHIC PERIPHERAL NEUROPATHY: Status: ACTIVE | Noted: 2020-11-18

## 2024-02-25 PROBLEM — M17.0 PRIMARY OSTEOARTHRITIS OF BOTH KNEES: Status: RESOLVED | Noted: 2020-11-18 | Resolved: 2024-02-25

## 2024-02-25 PROBLEM — M54.9 CHRONIC BACK PAIN: Status: ACTIVE | Noted: 2024-02-25

## 2024-02-25 PROBLEM — R57.9 SHOCK (HCC): Status: ACTIVE | Noted: 2024-02-24

## 2024-02-25 PROBLEM — D64.9 ANEMIA: Status: ACTIVE | Noted: 2024-02-25

## 2024-02-25 PROBLEM — I35.0 AORTIC STENOSIS: Status: ACTIVE | Noted: 2024-02-25

## 2024-02-25 PROBLEM — E66.9 OBESE: Status: ACTIVE | Noted: 2024-02-25

## 2024-02-25 PROBLEM — S32.502A: Status: RESOLVED | Noted: 2024-01-07 | Resolved: 2024-02-25

## 2024-02-25 PROBLEM — I67.89 CEREBRAL MICROVASCULAR DISEASE: Status: ACTIVE | Noted: 2024-02-25
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
## 2024-02-25 PROBLEM — R41.0 DELIRIUM: Status: RESOLVED | Noted: 2024-01-13 | Resolved: 2024-02-25

## 2024-02-25 LAB
ALBUMIN SERPL-MCNC: 2.2 G/DL (ref 3.5–5)
ALBUMIN/GLOB SERPL: 0.7 (ref 1.1–2.2)
ALP SERPL-CCNC: 137 U/L (ref 45–117)
ALT SERPL-CCNC: 56 U/L (ref 12–78)
AMMONIA PLAS-SCNC: 15 UMOL/L
ANION GAP SERPL CALC-SCNC: 3 MMOL/L (ref 5–15)
AST SERPL-CCNC: 87 U/L (ref 15–37)
B PERT DNA SPEC QL NAA+PROBE: NOT DETECTED
BACTERIA SPEC CULT: NORMAL
BASOPHILS # BLD: 0 K/UL (ref 0–0.1)
BASOPHILS NFR BLD: 0 % (ref 0–1)
BILIRUB SERPL-MCNC: 0.9 MG/DL (ref 0.2–1)
BORDETELLA PARAPERTUSSIS BY PCR: NOT DETECTED
BUN SERPL-MCNC: 11 MG/DL (ref 6–20)
BUN/CREAT SERPL: 20 (ref 12–20)
C COLI+JEJUNI TUF STL QL NAA+PROBE: NEGATIVE
C DIFF GDH STL QL: POSITIVE
C DIFF TOX A+B STL QL IA: POSITIVE
C DIFF TOXIN INTERPRETATION: ABNORMAL
C PNEUM DNA SPEC QL NAA+PROBE: NOT DETECTED
CALCIUM SERPL-MCNC: 7.8 MG/DL (ref 8.5–10.1)
CHLORIDE SERPL-SCNC: 108 MMOL/L (ref 97–108)
CHOLEST SERPL-MCNC: 134 MG/DL
CO2 SERPL-SCNC: 30 MMOL/L (ref 21–32)
COMMENT:: NORMAL
CREAT SERPL-MCNC: 0.55 MG/DL (ref 0.55–1.02)
CRP SERPL-MCNC: 6.78 MG/DL (ref 0–0.3)
D DIMER PPP FEU-MCNC: 3.32 MG/L FEU (ref 0–0.65)
DIFFERENTIAL METHOD BLD: ABNORMAL
EC STX1+STX2 GENES STL QL NAA+PROBE: NEGATIVE
EKG ATRIAL RATE: 73 BPM
EKG DIAGNOSIS: NORMAL
EKG P AXIS: 39 DEGREES
EKG P-R INTERVAL: 228 MS
EKG Q-T INTERVAL: 444 MS
EKG QRS DURATION: 106 MS
EKG QTC CALCULATION (BAZETT): 489 MS
EKG R AXIS: 19 DEGREES
EKG T AXIS: 92 DEGREES
EKG VENTRICULAR RATE: 73 BPM
EOSINOPHIL # BLD: 0.1 K/UL (ref 0–0.4)
EOSINOPHIL NFR BLD: 2 % (ref 0–7)
ERYTHROCYTE [DISTWIDTH] IN BLOOD BY AUTOMATED COUNT: 14.7 % (ref 11.5–14.5)
ETEC ELTA+ESTB GENES STL QL NAA+PROBE: NEGATIVE
FERRITIN SERPL-MCNC: 462 NG/ML (ref 26–388)
FLUAV SUBTYP SPEC NAA+PROBE: NOT DETECTED
FLUBV RNA SPEC QL NAA+PROBE: NOT DETECTED
FOLATE SERPL-MCNC: NORMAL NG/ML (ref 5–21)
GLOBULIN SER CALC-MCNC: 3.2 G/DL (ref 2–4)
GLUCOSE SERPL-MCNC: 121 MG/DL (ref 65–100)
HADV DNA SPEC QL NAA+PROBE: NOT DETECTED
HAPTOGLOB SERPL-MCNC: 249 MG/DL (ref 30–200)
HCOV 229E RNA SPEC QL NAA+PROBE: NOT DETECTED
HCOV HKU1 RNA SPEC QL NAA+PROBE: NOT DETECTED
HCOV NL63 RNA SPEC QL NAA+PROBE: NOT DETECTED
HCOV OC43 RNA SPEC QL NAA+PROBE: NOT DETECTED
HCT VFR BLD AUTO: 34.9 % (ref 35–47)
HDLC SERPL-MCNC: 33 MG/DL
HDLC SERPL: 4.1 (ref 0–5)
HGB BLD-MCNC: 11.3 G/DL (ref 11.5–16)
HMPV RNA SPEC QL NAA+PROBE: NOT DETECTED
HPIV1 RNA SPEC QL NAA+PROBE: NOT DETECTED
HPIV2 RNA SPEC QL NAA+PROBE: NOT DETECTED
HPIV3 RNA SPEC QL NAA+PROBE: NOT DETECTED
HPIV4 RNA SPEC QL NAA+PROBE: NOT DETECTED
IMM GRANULOCYTES # BLD AUTO: 0.1 K/UL (ref 0–0.04)
IMM GRANULOCYTES NFR BLD AUTO: 1 % (ref 0–0.5)
IRON SATN MFR SERPL: 13 % (ref 20–50)
IRON SERPL-MCNC: 23 UG/DL (ref 35–150)
LDH SERPL L TO P-CCNC: 303 U/L (ref 81–246)
LDLC SERPL CALC-MCNC: 65.4 MG/DL (ref 0–100)
LYMPHOCYTES # BLD: 1.6 K/UL (ref 0.8–3.5)
LYMPHOCYTES NFR BLD: 20 % (ref 12–49)
M PNEUMO DNA SPEC QL NAA+PROBE: NOT DETECTED
MAGNESIUM SERPL-MCNC: 1.8 MG/DL (ref 1.6–2.4)
MCH RBC QN AUTO: 31.8 PG (ref 26–34)
MCHC RBC AUTO-ENTMCNC: 32.4 G/DL (ref 30–36.5)
MCV RBC AUTO: 98.3 FL (ref 80–99)
MONOCYTES # BLD: 0.5 K/UL (ref 0–1)
MONOCYTES NFR BLD: 7 % (ref 5–13)
NEUTS SEG # BLD: 5.6 K/UL (ref 1.8–8)
NEUTS SEG NFR BLD: 70 % (ref 32–75)
NRBC # BLD: 0 K/UL (ref 0–0.01)
NRBC BLD-RTO: 0 PER 100 WBC
P SHIGELLOIDES DNA STL QL NAA+PROBE: NEGATIVE
PLATELET # BLD AUTO: 128 K/UL (ref 150–400)
PMV BLD AUTO: 9.7 FL (ref 8.9–12.9)
POTASSIUM SERPL-SCNC: 3.4 MMOL/L (ref 3.5–5.1)
PROCALCITONIN SERPL-MCNC: 0.08 NG/ML
PROT SERPL-MCNC: 5.4 G/DL (ref 6.4–8.2)
RBC # BLD AUTO: 3.55 M/UL (ref 3.8–5.2)
RSV RNA SPEC QL NAA+PROBE: NOT DETECTED
RV+EV RNA SPEC QL NAA+PROBE: NOT DETECTED
SALMONELLA SP SPAO STL QL NAA+PROBE: NEGATIVE
SARS-COV-2 RNA RESP QL NAA+PROBE: NOT DETECTED
SERVICE CMNT-IMP: NORMAL
SHIGELLA SP+EIEC IPAH STL QL NAA+PROBE: NEGATIVE
SODIUM SERPL-SCNC: 141 MMOL/L (ref 136–145)
SPECIMEN HOLD: NORMAL
TIBC SERPL-MCNC: 175 UG/DL (ref 250–450)
TRIGL SERPL-MCNC: 178 MG/DL
V CHOL+PARA+VUL DNA STL QL NAA+NON-PROBE: NEGATIVE
VIT B12 SERPL-MCNC: 794 PG/ML (ref 193–986)
VLDLC SERPL CALC-MCNC: 35.6 MG/DL
WBC # BLD AUTO: 7.9 K/UL (ref 3.6–11)
Y ENTEROCOL DNA STL QL NAA+NON-PROBE: NEGATIVE

## 2024-02-25 PROCEDURE — 83550 IRON BINDING TEST: CPT

## 2024-02-25 PROCEDURE — 0202U NFCT DS 22 TRGT SARS-COV-2: CPT

## 2024-02-25 PROCEDURE — 97530 THERAPEUTIC ACTIVITIES: CPT

## 2024-02-25 PROCEDURE — 2580000003 HC RX 258: Performed by: INTERNAL MEDICINE

## 2024-02-25 PROCEDURE — 1100000000 HC RM PRIVATE

## 2024-02-25 PROCEDURE — 6370000000 HC RX 637 (ALT 250 FOR IP): Performed by: INTERNAL MEDICINE

## 2024-02-25 PROCEDURE — 82746 ASSAY OF FOLIC ACID SERUM: CPT

## 2024-02-25 PROCEDURE — 85379 FIBRIN DEGRADATION QUANT: CPT

## 2024-02-25 PROCEDURE — 6360000002 HC RX W HCPCS: Performed by: INTERNAL MEDICINE

## 2024-02-25 PROCEDURE — 36415 COLL VENOUS BLD VENIPUNCTURE: CPT

## 2024-02-25 PROCEDURE — 84145 PROCALCITONIN (PCT): CPT

## 2024-02-25 PROCEDURE — 82728 ASSAY OF FERRITIN: CPT

## 2024-02-25 PROCEDURE — 83010 ASSAY OF HAPTOGLOBIN QUANT: CPT

## 2024-02-25 PROCEDURE — 83540 ASSAY OF IRON: CPT

## 2024-02-25 PROCEDURE — 83735 ASSAY OF MAGNESIUM: CPT

## 2024-02-25 PROCEDURE — 82607 VITAMIN B-12: CPT

## 2024-02-25 PROCEDURE — 85025 COMPLETE CBC W/AUTO DIFF WBC: CPT

## 2024-02-25 PROCEDURE — 80061 LIPID PANEL: CPT

## 2024-02-25 PROCEDURE — 97162 PT EVAL MOD COMPLEX 30 MIN: CPT

## 2024-02-25 PROCEDURE — 83615 LACTATE (LD) (LDH) ENZYME: CPT

## 2024-02-25 PROCEDURE — 97165 OT EVAL LOW COMPLEX 30 MIN: CPT

## 2024-02-25 PROCEDURE — 80053 COMPREHEN METABOLIC PANEL: CPT

## 2024-02-25 PROCEDURE — 86140 C-REACTIVE PROTEIN: CPT

## 2024-02-25 PROCEDURE — 94761 N-INVAS EAR/PLS OXIMETRY MLT: CPT

## 2024-02-25 PROCEDURE — 93010 ELECTROCARDIOGRAM REPORT: CPT | Performed by: INTERNAL MEDICINE

## 2024-02-25 RX ORDER — SODIUM CHLORIDE, SODIUM LACTATE, POTASSIUM CHLORIDE, CALCIUM CHLORIDE 600; 310; 30; 20 MG/100ML; MG/100ML; MG/100ML; MG/100ML
INJECTION, SOLUTION INTRAVENOUS CONTINUOUS
Status: DISCONTINUED | OUTPATIENT
Start: 2024-02-25 | End: 2024-02-27

## 2024-02-25 RX ORDER — BUDESONIDE 3 MG/1
3 CAPSULE, COATED PELLETS ORAL DAILY
Status: DISCONTINUED | OUTPATIENT
Start: 2024-02-25 | End: 2024-02-27 | Stop reason: HOSPADM

## 2024-02-25 RX ORDER — VANCOMYCIN HYDROCHLORIDE 50 MG/ML
125 KIT ORAL EVERY 6 HOURS SCHEDULED
Status: DISCONTINUED | OUTPATIENT
Start: 2024-02-25 | End: 2024-02-27 | Stop reason: HOSPADM

## 2024-02-25 RX ORDER — NOREPINEPHRINE BITARTRATE 0.06 MG/ML
.5-2 INJECTION, SOLUTION INTRAVENOUS CONTINUOUS
Status: DISCONTINUED | OUTPATIENT
Start: 2024-02-25 | End: 2024-02-25

## 2024-02-25 RX ADMIN — BUDESONIDE 3 MG: 3 CAPSULE, GELATIN COATED ORAL at 16:20

## 2024-02-25 RX ADMIN — VANCOMYCIN HYDROCHLORIDE 125 MG: KIT at 18:29

## 2024-02-25 RX ADMIN — Medication 1 CAPSULE: at 09:52

## 2024-02-25 RX ADMIN — SODIUM CHLORIDE, POTASSIUM CHLORIDE, SODIUM LACTATE AND CALCIUM CHLORIDE: 600; 310; 30; 20 INJECTION, SOLUTION INTRAVENOUS at 09:51

## 2024-02-25 RX ADMIN — ACETAMINOPHEN 650 MG: 325 TABLET ORAL at 15:49

## 2024-02-25 RX ADMIN — POTASSIUM BICARBONATE 20 MEQ: 782 TABLET, EFFERVESCENT ORAL at 17:28

## 2024-02-25 RX ADMIN — ENOXAPARIN SODIUM 40 MG: 100 INJECTION SUBCUTANEOUS at 17:27

## 2024-02-25 RX ADMIN — SODIUM CHLORIDE, PRESERVATIVE FREE 10 ML: 5 INJECTION INTRAVENOUS at 04:22

## 2024-02-25 RX ADMIN — SODIUM CHLORIDE, PRESERVATIVE FREE 10 ML: 5 INJECTION INTRAVENOUS at 09:56

## 2024-02-25 RX ADMIN — CEFEPIME 2000 MG: 2 INJECTION, POWDER, FOR SOLUTION INTRAVENOUS at 02:01

## 2024-02-25 ASSESSMENT — PAIN DESCRIPTION - LOCATION: LOCATION: HIP;KNEE

## 2024-02-25 ASSESSMENT — PAIN SCALES - GENERAL: PAINLEVEL_OUTOF10: 3

## 2024-02-25 NOTE — PLAN OF CARE
Problem: Physical Therapy - Adult  Goal: By Discharge: Performs mobility at highest level of function for planned discharge setting.  See evaluation for individualized goals.  Description: FUNCTIONAL STATUS PRIOR TO ADMISSION: The patient was functional at the wheelchair level and required moderate assistancefor transfers to the chair.  Recent hospitalization here in Jan 2024, [facility] and [facility], has only been home 1 week and was receiving HH.  Per son, she only stand pivot to chair with RW, no ambulation.      HOME SUPPORT PRIOR TO ADMISSION: The patient lived with daughter.  Care aid M-F 8-5    Physical Therapy Goals  Initiated 2/25/2024  1.  Patient will move from supine to sit and sit to supine in bed with minimal assistance within 7 day(s).    2.  Patient will perform sit to stand with minimal assistance within 7 day(s).  3.  Patient will transfer from bed to chair and chair to bed with minimal assistance using the least restrictive device within 7 day(s).    Outcome: Progressing   PHYSICAL THERAPY EVALUATION    Patient: Sujata Ernandez (79 y.o. female)  Date: 2/25/2024  Primary Diagnosis: Encephalopathy [G93.40]  Septic shock (HCC) [A41.9, R65.21]  Hypotension, unspecified hypotension type [I95.9]       Precautions: Restrictions/Precautions: Fall Risk, Weight Bearing, Other (comment), Contact Precautions (c-diff, COVID rule out, CAM boot to Right LE)                      ASSESSMENT :   DEFICITS/IMPAIRMENTS:   The patient is limited by decreased functional mobility, strength, activity tolerance, cognition, balance   Following admission for altered mental status and has a history of UTI's.  History of TAVR and CVA/TIA.  She is mostly wheelchair bound at home, just recently discharged from SNF.  Was not ambulating at SNF, and only performing transfers.     Based on the impairments listed above patient today with increased confusion, she is alert and oriented x2.  She

## 2024-02-25 NOTE — ED NOTES
Bedside and Verbal shift change report given to Luis Antonio RN  (oncoming nurse) by Pepe Ha RN (offgoing nurse). Report included the following information Nurse Handoff Report, Index, ED Encounter Summary, ED SBAR, Adult Overview, Surgery Report, and Intake/Output.

## 2024-02-25 NOTE — PROGRESS NOTES
SUBJ:  RASS 0. Very animated but confused, poorly oriented. NAD. Off vasopressors    OBJ:  Vitals:    02/25/24 1115 02/25/24 1133 02/25/24 1136 02/25/24 1145   BP: (!) 119/92 (!) 119/93 (!) 94/59    Pulse: 97 100 99 (!) 103   Resp: 25 18   Temp:       TempSrc:       SpO2: 94% 95% 94% 95%   Weight:       Height:         Physical Exam:  GEN: Chronically ill appearing, NAD  HEENT: NCAT, sclerae white  NECK: No JVD noted  CHEST: Clear to auscultation anteriorly  CARDIAC: sinus rhythm, regular, no murmur noted  ABD: Obese, soft, NT, +BS  EXT: Warm, extensive bruising of all extremities, symmetric pretibial edema  NEURO: Cranial nerves intact, symmetric strength, no focal deficits noted  DERM: No lesions noted    I have reviewed the lab results from this day. Relevant abnormalities are discussed in the impression and plan    IMPRESSION:  AMS - more alert but remains confused.   Acute encephalopathy/mild delirium  Hypotension, resolved  H/O frequent UTIs - does not appear to have UTI presently  H/O AoVR  H/O frequent falls  Chronic and progressive debilitation    PLAN/REC:  Continue expectant mgmt    OK to transfer out of ICU to SD level of care    As discussed with daughter yesterday, I think that she would benefit from meeting with Palliative Care to discuss advanced directives, goals of care and options for home health services (perhaps even home hospice/palliative care)    There are no ongoing CCM issues that are not already addressed and managed by the primary service. Zonia CCM will sign off. Please call if we can be of further assistance     MD Zonia [facility]  [phone]  2/25/2024

## 2024-02-25 NOTE — PLAN OF CARE
Problem: Occupational Therapy - Adult  Goal: By Discharge: Performs self-care activities at highest level of function for planned discharge setting.  See evaluation for individualized goals.  Description: FUNCTIONAL STATUS PRIOR TO ADMISSION: The patient was functional at the wheelchair level and required moderate assistancefor transfers to the chair. Pt requires assistance for ADLs and has been sponge bathing.   Recent hospitalization here in Jan 2024, discharged to SNF ([facility]) and stayed 3 weeks, has only been home 1 week and was receiving HH. Per son, she only stand pivot to chair with RW, no ambulation.     HOME SUPPORT PRIOR TO ADMISSION: The patient lived with daughter. Care aid M-F 8-5       Occupational Therapy Goals:  Initiated 2/25/2024  1.  Patient will perform grooming, seated EOB or in chair, with Set-up and Supervision within 7 day(s).  2.  Patient will perform upper body dressing with Minimal Assist within 7 day(s).  3.  Patient will perform seated anterior bathing (neck to knees) with Minimal Assist within 7 day(s).  4.  Patient will perform toilet transfers to [facility] with Moderate Assist  within 7 day(s).  5.  Patient will participate in upper extremity therapeutic exercise/activities with Supervision for 10 minutes within 7 day(s).    Outcome: Progressing     OCCUPATIONAL THERAPY EVALUATION    Patient: Sujata Ernandez (79 y.o. female)  Date: 2/25/2024  Primary Diagnosis: Encephalopathy [G93.40]  Septic shock (HCC) [A41.9, R65.21]  Hypotension, unspecified hypotension type [I95.9]         Precautions: Fall Risk, Weight Bearing, Other (comment), Contact Precautions (c-diff, COVID rule out, CAM boot to Right LE)                  ASSESSMENT :  The patient is limited by decreased functional mobility, independence in ADLs, high-level IADLs, ROM, strength, endurance, safety awareness, cognition, attention/concentration, balance following admission for AMS with history of recurrent UTIs.  assistance;Decreased awareness of need for safety  Insights: Not aware of deficits  Initiation: Requires cues for some  Sequencing: Requires cues for some    Vision/Perceptual:    Vision - Basic Assessment  Prior Vision: Wears glasses all the time       Range of Motion:   AROM: Generally decreased, functional  PROM: Generally decreased, functional    Strength:  Strength: Generally decreased, functional    Coordination:  Coordination: Generally decreased, functional     Functional Mobility and Transfers for ADLs:    Bed Mobility:     Bed Mobility Training  Bed Mobility Training: Yes  Rolling: Minimum assistance  Supine to Sit: Minimum assistance;Assist X1;Additional time  Sit to Supine: Minimum assistance;Assist X2;Additional time  Scooting: Minimum assistance;Assist X2;Additional time    Transfers:  Unable to assess this session  Balance:  Balance  Sitting: Intact    ADL Assessment:     Skin Care: Chlorhexidine wipes;Bath wipes    LE Dressing: Dependent/Total  LE Dressing Skilled Clinical Factors: total A to manage socks    ADL Intervention and task modifications:    Intervention/Education specific to: \"Stroke diagnoses\"    Fugl-Fall Assessment of Motor Recovery after Stroke:     Reflex Activity  Flexors/Biceps/Fingers: Can be elicited  Extensors/Triceps: Can be elicited  Reflex Subtotal: 4    Volitional Movement Within Synergies  Shoulder Retraction: Full  Shoulder Elevation: Full  Shoulder Abduction (90 degrees): Full  Shoulder External Rotation: Full  Elbow Flexion: Full  Forearm Supination: Full  Shoulder Adduction/Internal Rotation: Full  Elbow Extension: Full  Forearm Pronation: Full  Subtotal: 18    Volitional Movement Mixing Synergies  Hand to Lumbar Spine: Full  Shoulder Flexion (0-90 degrees): Full  Pronation-Supination: Full  Subtotal: 6    Volitional Movement With Little or No Synergy  Shoulder Abduction (0-90 degrees): Full  Shoulder Flexion ( degrees): Full  Pronation/Supination: Full  Subtotal:

## 2024-02-25 NOTE — PROGRESS NOTES
[facility] Dosing Services: Antimicrobial Stewardship Daily Doc  Consult for antibiotic dosing of Vancomycin by Dr. Smith  Indication: Sepsis  Day of Therapy: 1    Ht Readings from Last 1 Encounters:   02/24/24 1.575 m (5' 2\")        Wt Readings from Last 1 Encounters:   02/24/24 83.9 kg (185 lb)      Vancomycin therapy:  Loading dose: Vancomycin 2000 mg x1 dose given 2/24 @ 1929  Maintenance dose: Vancomycin 750 mg IV every 12 hours   Dose calculated to approximate a           a. Target AUC/MICHAELLE of 400-600          b. Trough of 15-20 mcg/mL   Last level:   mcg/mL  Plan: WBC 6.3, Scr 0.65, Procalcitonin 0.11, afebrile. Will plan to order scheduled vancomycin dosing 750 mg IV every 12 hours (per insight Rx predicts , Tr 13.9, T1/2 = 12.8 hours). Plan to order vancomycin level within 24-48 hours (not yet ordered)    Dose administration notes: Doses given appropriately as scheduled      Other Antimicrobial   (not dosed by pharmacist) Cefepime 2g every 8 hours EI  Flagyl 500 mg every 8 hours   Cultures 2/24 MRSA nares: in process  2/24 Blood Cx: in process  2/24 Blood Cx: in process  2/24 Urine Cx: in process  2/19 Stool Cx: negative for C diff, campylobacter, salmonella/shigella, E.coli - F   Serum Creatinine Lab Results   Component Value Date/Time    CREATININE 0.65 02/24/2024 01:46 PM          Creatinine Clearance Estimated Creatinine Clearance: 69 mL/min (based on SCr of 0.65 mg/dL).     Temp Temp: 98.3 °F (36.8 °C)       WBC Lab Results   Component Value Date/Time    WBC 6.3 02/24/2024 01:46 PM          Procalcitonin Lab Results   Component Value Date/Time    PROCAL 0.11 02/24/2024 01:46 PM      For Antifungals, Metronidazole and Nafcillin: Lab Results   Component Value Date/Time    ALT 15 02/24/2024 01:46 PM    AST 14 02/24/2024 01:46 PM        Pharmacist: Carlos Valentin, [facility]  [phone]

## 2024-02-25 NOTE — PROGRESS NOTES
GI note    Appeared on my list, but I can see no order for GI consult nor notes indicating my attention is requested.  I spoke to the patient/family before I realized that I've not been asked to see.    Patient of Dr Bardales with established history of lymphocytic colitis / diarrhea.  With therapy (budesonide) she notes control of diarrhea.    Diarrhea was not the reason for her presentation (confusion was), but the diarrhea has been a bit worse over last 24-36 hours leading to request for stool testing which is pending.    Had I been asked to see her I would have no additional recommendations other than observation of her pattern of stooling and results of stool testing as ordered.    If GI attention is needed, don't hesitate to contact.  King Padron MD

## 2024-02-25 NOTE — PLAN OF CARE
Problem: Pain  Goal: Verbalizes/displays adequate comfort level or baseline comfort level  Outcome: Progressing  Flowsheets (Taken 2/25/2024 0900 by Sami Alcala RN)  Verbalizes/displays adequate comfort level or baseline comfort level:   Encourage patient to monitor pain and request assistance   Assess pain using appropriate pain scale   Administer analgesics based on type and severity of pain and evaluate response   Implement non-pharmacological measures as appropriate and evaluate response     Problem: Safety - Adult  Goal: Free from fall injury  Outcome: Progressing  Flowsheets (Taken 2/25/2024 0800 by Sami Alcala RN)  Free From Fall Injury: Instruct family/caregiver on patient safety     Problem: Confusion  Goal: Confusion, delirium, dementia, or psychosis is improved or at baseline  Description: INTERVENTIONS:  1. Assess for possible contributors to thought disturbance, including medications, impaired vision or hearing, underlying metabolic abnormalities, dehydration, psychiatric diagnoses, and notify attending LIP  2. [place] high risk fall precautions, as indicated  3. Provide frequent short contacts to provide reality reorientation, refocusing and direction  4. Decrease environmental stimuli, including noise as appropriate  5. Monitor and intervene to maintain adequate nutrition, hydration, elimination, sleep and activity  6. If unable to ensure safety without constant attention obtain sitter and review sitter guidelines with assigned personnel  7. Initiate Psychosocial CNS and Spiritual Care consult, as indicated  Outcome: Progressing  Flowsheets (Taken 2/25/2024 0900 by Sami Alcala RN)  Effect of thought disturbance (confusion, delirium, dementia, or psychosis) are managed with adequate functional status:   Assess for contributors to thought disturbance, including medications, impaired vision or hearing, underlying metabolic abnormalities, dehydration, psychiatric diagnoses, notify LIP    [place] high risk fall precautions, as indicated   Provide frequent short contacts to provide reality reorientation, refocusing and direction   Decrease environmental stimuli, including noise as appropriate   Monitor and intervene to maintain adequate nutrition, hydration, elimination, sleep and activity   If unable to ensure safety without constant attention obtain sitter and review sitter guidelines with assigned personnel     Problem: Physical Therapy - Adult  Goal: By Discharge: Performs mobility at highest level of function for planned discharge setting.  See evaluation for individualized goals.  Description: FUNCTIONAL STATUS PRIOR TO ADMISSION: The patient was functional at the wheelchair level and required moderate assistancefor transfers to the chair.  Recent hospitalization here in Jan 2024, discharged to SNF ([facility]) and stayed 3 weeks, has only been home 1 week and was receiving HH.  Per son, she only stand pivot to chair with RW, no ambulation.      HOME SUPPORT PRIOR TO ADMISSION: The patient lived with daughter.  Care aid M-F 8-5    Physical Therapy Goals  Initiated 2/25/2024  1.  Patient will move from supine to sit and sit to supine in bed with minimal assistance within 7 day(s).    2.  Patient will perform sit to stand with minimal assistance within 7 day(s).  3.  Patient will transfer from bed to chair and chair to bed with minimal assistance using the least restrictive device within 7 day(s).    2/25/2024 1258 by Sweta Keith, PT  Outcome: Progressing     Problem: Occupational Therapy - Adult  Goal: By Discharge: Performs self-care activities at highest level of function for planned discharge setting.  See evaluation for individualized goals.  Description: FUNCTIONAL STATUS PRIOR TO ADMISSION: The patient was functional at the wheelchair level and required moderate assistancefor transfers to the chair. Pt requires assistance for ADLs and has been sponge bathing.   Recent

## 2024-02-25 NOTE — PROGRESS NOTES
TRANSFER - IN REPORT:    Verbal report received from Vasquez Gallardo on Sujata Ernandez  being received from ICU for routine progression of patient care      Report consisted of patient's Situation, Background, Assessment and   Recommendations(SBAR).     Information from the following report(s) Nurse Handoff Report, Intake/Output, MAR, and Recent Results was reviewed with the receiving nurse.    Opportunity for questions and clarification was provided.

## 2024-02-25 NOTE — PROGRESS NOTES
(Susceptibility) Collected: 02/13/24 1537    Order Status: Completed Specimen: Urine, clean catch Updated: 02/17/24 1135     Urine Culture, Routine --     Escherichia coli  Identified by an automated biochemical system.       Comment: 50,000-100,000 colony forming units per mL  Cefazolin with an MICHAELLE <=16 predicts susceptibility to the oral agents  cefaclor, cefdinir, cefpodoxime, cefprozil, cefuroxime, cephalexin,  and loracarbef when used for therapy of uncomplicated urinary tract  infections due to E. coli, Klebsiella pneumoniae, and Proteus  mirabilis.          Urine Culture, Routine --     Enterococcus faecalis  50,000-100,000 colony forming units per mL       Comment: Note: this isolate is vancomycin-susceptible.  This information is provided for epidemiologic purposes only:  vancomycin is not among the antibiotics recommended for therapy  of urinary tract infections caused by Enterococcus.  For Enterococcus species, aminoglycosides (except for high-level  resistance screening), cephalosporins, clindamycin, and  trimethoprim-sulfamethoxazole are not effective clinically.  (CLSI, [id number]-[id number], 2016)          Urine Culture, Routine --     Pseudomonas aeruginosa  25,000-50,000 colony forming units per mL      Narrative:      Performed at:  [facility]  [address]  [id number]  : Isacc Johnson MD, Phone:  [phone]    Susceptibility        Escherichia coli      Not Specified      amoxicillin-clavulanate I ug/mL Intermediate      ampicillin R ug/mL Resistant      ceFAZolin S ug/mL Sensitive      cefepime S ug/mL Sensitive      cefTRIAXone S ug/mL Sensitive      cefuroxime I ug/mL Intermediate      ciprofloxacin R ug/mL Resistant      ertapenem S ug/mL Sensitive      gentamicin S ug/mL Sensitive      imipenem S ug/mL Sensitive      levofloxacin R ug/mL Resistant      meropenem S ug/mL Sensitive      nitrofurantoin S ug/mL Sensitive      piperacillin-tazobactam S ug/mL  Sensitive      tetracycline R ug/mL Resistant      tobramycin S ug/mL Sensitive      trimethoprim-sulfamethoxazole R ug/mL Resistant                       Susceptibility        Enterococcus faecalis      Not Specified      ciprofloxacin S ug/mL Sensitive      levofloxacin S ug/mL Sensitive      nitrofurantoin S ug/mL Sensitive      penicillin S ug/mL Sensitive      tetracycline R ug/mL Resistant      vancomycin S ug/mL Sensitive                       Susceptibility        Pseudomonas aeruginosa      Not Specified      amikacin S ug/mL Sensitive      cefepime S ug/mL Sensitive      cefTAZidime S ug/mL Sensitive      ciprofloxacin S ug/mL Sensitive      gentamicin S ug/mL Sensitive      imipenem S ug/mL Sensitive      levofloxacin S ug/mL Sensitive      meropenem S ug/mL Sensitive      piperacillin S ug/mL Sensitive      ticarcillin S ug/mL Sensitive      tobramycin S ug/mL Sensitive                       Susceptibility Comments       Escherichia coli    Performed at:  [address] [place], [place] [address], [place], [place]  [id number]  : Isacc Johnson MD, Phone:  [phone]      Enterococcus faecalis    Performed at:  [address] [place], [place] [address], [place], [place]  [id number]  : Isacc Johnson MD, Phone:  [phone]      Pseudomonas aeruginosa    Performed at:  [address] [place], [place] [address], [place], [place]  [id number]  : Isacc Johnson MD, Phone:  [phone]                       Other pertinent lab: none    Total time spent with patient: 45 Minutes Critical care. I personally reviewed chart, notes, data and current medications in the medical record.  I have personally examined and treated the patient at bedside during this period.                  Care Plan discussed with: Patient, Family, Care Manager, Nursing Staff, Consultant/Specialist, and >50% of time spent in counseling and coordination of care    Discussed:  Care Plan and D/C

## 2024-02-26 ENCOUNTER — APPOINTMENT (OUTPATIENT)
Facility: HOSPITAL | Age: 79
DRG: 640 | End: 2024-02-26
Payer: MEDICARE

## 2024-02-26 LAB
BACTERIA SPEC CULT: NORMAL
BACTERIA SPEC CULT: NORMAL
SERVICE CMNT-IMP: NORMAL

## 2024-02-26 PROCEDURE — 94761 N-INVAS EAR/PLS OXIMETRY MLT: CPT

## 2024-02-26 PROCEDURE — 6360000002 HC RX W HCPCS: Performed by: INTERNAL MEDICINE

## 2024-02-26 PROCEDURE — 97530 THERAPEUTIC ACTIVITIES: CPT

## 2024-02-26 PROCEDURE — 6370000000 HC RX 637 (ALT 250 FOR IP): Performed by: INTERNAL MEDICINE

## 2024-02-26 PROCEDURE — 1100000000 HC RM PRIVATE

## 2024-02-26 PROCEDURE — 6360000002 HC RX W HCPCS

## 2024-02-26 PROCEDURE — 97535 SELF CARE MNGMENT TRAINING: CPT

## 2024-02-26 PROCEDURE — 99223 1ST HOSP IP/OBS HIGH 75: CPT | Performed by: NURSE PRACTITIONER

## 2024-02-26 PROCEDURE — 2580000003 HC RX 258: Performed by: INTERNAL MEDICINE

## 2024-02-26 PROCEDURE — 6370000000 HC RX 637 (ALT 250 FOR IP)

## 2024-02-26 RX ORDER — OLANZAPINE 5 MG/1
5 TABLET ORAL ONCE
Status: COMPLETED | OUTPATIENT
Start: 2024-02-26 | End: 2024-02-26

## 2024-02-26 RX ORDER — HALOPERIDOL 5 MG/ML
2 INJECTION INTRAMUSCULAR ONCE
Status: COMPLETED | OUTPATIENT
Start: 2024-02-26 | End: 2024-02-26

## 2024-02-26 RX ADMIN — POTASSIUM BICARBONATE 20 MEQ: 782 TABLET, EFFERVESCENT ORAL at 08:54

## 2024-02-26 RX ADMIN — PANCRELIPASE LIPASE, PANCRELIPASE PROTEASE, PANCRELIPASE AMYLASE 40000 UNITS: 20000; 63000; 84000 CAPSULE, DELAYED RELEASE ORAL at 11:25

## 2024-02-26 RX ADMIN — PANCRELIPASE LIPASE, PANCRELIPASE PROTEASE, PANCRELIPASE AMYLASE 40000 UNITS: 20000; 63000; 84000 CAPSULE, DELAYED RELEASE ORAL at 17:30

## 2024-02-26 RX ADMIN — OLANZAPINE 5 MG: 5 TABLET, FILM COATED ORAL at 02:39

## 2024-02-26 RX ADMIN — VANCOMYCIN HYDROCHLORIDE 125 MG: KIT at 00:20

## 2024-02-26 RX ADMIN — BUDESONIDE 3 MG: 3 CAPSULE, GELATIN COATED ORAL at 11:25

## 2024-02-26 RX ADMIN — ENOXAPARIN SODIUM 40 MG: 100 INJECTION SUBCUTANEOUS at 17:30

## 2024-02-26 RX ADMIN — VANCOMYCIN HYDROCHLORIDE 125 MG: KIT at 06:05

## 2024-02-26 RX ADMIN — ACETAMINOPHEN 650 MG: 325 TABLET ORAL at 22:13

## 2024-02-26 RX ADMIN — HALOPERIDOL LACTATE 2 MG: 5 INJECTION, SOLUTION INTRAMUSCULAR at 03:37

## 2024-02-26 RX ADMIN — PANCRELIPASE LIPASE, PANCRELIPASE PROTEASE, PANCRELIPASE AMYLASE 40000 UNITS: 20000; 63000; 84000 CAPSULE, DELAYED RELEASE ORAL at 08:54

## 2024-02-26 RX ADMIN — Medication 1 CAPSULE: at 08:54

## 2024-02-26 RX ADMIN — SODIUM CHLORIDE, PRESERVATIVE FREE 10 ML: 5 INJECTION INTRAVENOUS at 19:55

## 2024-02-26 RX ADMIN — VANCOMYCIN HYDROCHLORIDE 125 MG: KIT at 17:30

## 2024-02-26 RX ADMIN — POTASSIUM BICARBONATE 20 MEQ: 782 TABLET, EFFERVESCENT ORAL at 19:55

## 2024-02-26 RX ADMIN — SODIUM CHLORIDE, POTASSIUM CHLORIDE, SODIUM LACTATE AND CALCIUM CHLORIDE: 600; 310; 30; 20 INJECTION, SOLUTION INTRAVENOUS at 17:34

## 2024-02-26 ASSESSMENT — PAIN - FUNCTIONAL ASSESSMENT: PAIN_FUNCTIONAL_ASSESSMENT: PREVENTS OR INTERFERES WITH MANY ACTIVE NOT PASSIVE ACTIVITIES

## 2024-02-26 ASSESSMENT — PAIN SCALES - GENERAL
PAINLEVEL_OUTOF10: 0
PAINLEVEL_OUTOF10: 0
PAINLEVEL_OUTOF10: 9
PAINLEVEL_OUTOF10: 4
PAINLEVEL_OUTOF10: 0

## 2024-02-26 ASSESSMENT — PAIN DESCRIPTION - FREQUENCY: FREQUENCY: CONTINUOUS

## 2024-02-26 ASSESSMENT — PAIN DESCRIPTION - ORIENTATION: ORIENTATION: LEFT;RIGHT

## 2024-02-26 ASSESSMENT — PAIN DESCRIPTION - LOCATION: LOCATION: KNEE

## 2024-02-26 ASSESSMENT — PAIN DESCRIPTION - PAIN TYPE: TYPE: CHRONIC PAIN

## 2024-02-26 ASSESSMENT — PAIN DESCRIPTION - DESCRIPTORS: DESCRIPTORS: SORE

## 2024-02-26 NOTE — WOUND CARE
Wound Consult:  new consult Visit. Chart reviewed.  Consulted for skin tears.  Spoke with patients nurse.  Patient is resting on a carol bed with KEVIN mattress.  Heels off loaded with pillows.  Patient is awake, [place]; requires 2 assists to move side to side in bed.  Morgan score 14.  Assessment:ALL WOUNDS POA  Left hand- skin tear- 2.4x1x0.1cm pink, moist, 70% flap, no drainage, no surrounding redness  Left upper arm-2x1x0.1cm skin tear-pink, moist, 50% flap intact, no drainage, no surrounding redness, appears to be partially attached and healing.  Right arm-skin tear- 1x1x0.1cm pink, moist, no drainage, no surrounding redness.  Left lateral leg- 3x1.4x0.1cm dry, mixed wound bed, pink, yellow, no surrounding redness, appears partially resurfaced, no drainage.  Bilateral heels, sacrum, buttocks- no redness noted  Treatment:  Left hand, left arm, right arm, left leg- cleansed with wound cleanser, xeroform, to wound beds and foam dressing  Wound Recommendations:  Left hand, left arm, right arm, left leg- cleanse with wound cleanser, xeroform, to wound beds and foam dressing. Change every 3 days  Skin Care / PI Prevention Recommendations:  1. Minimize friction/shear: minimize layers of linen/pads under patient.  2. Off load pressure/reposition:  turn and reposition approximately every 2 hours; float heels with pillows or use off loading heel boots; waffle cushion for sitting; position wedge.  3. Manage Moisture - keep skin folds dry; incontinence skin care with incontinence wipes; zinc guard barrier ointment; appropriate sized briefs .  4. Continue to monitor nutrition, pain, and skin risk scale, and skin assessment.  Plan:  Spoke with Dr. Teran regarding findings and proposed orders for treatment.  We will continue to reassess weekly and as needed.  Trista Frost RN  [facility], Wound / Ostomy Department  Wound Healing Office [phone]

## 2024-02-26 NOTE — PROGRESS NOTES
0200 Patient has increased confusion and wants to go out of bed. Referred to Kitty Abel NP.     0402 Patient verbalized desires to be dead. She states \"I want to be dead.\" Performed suicide screen and let on call provider know.     0615 Patient refused blood extraction. Informed Kitty Red NP.

## 2024-02-26 NOTE — CONSULTS
Palliative Medicine  Patient Name: Sujata Ernandez  YOB: 1945  MRN: [id number]  Age: 79 y.o.  Gender: female    Date of Initial Consult: February 26, 2024  Date of Service: 2/26/2024  Time: 12:43 PM  Provider: Linda Solares NP  Hospital Day: 3  Admit Date: 2/24/2024  Referring Provider: Dr. Teran       Reasons for Consultation:  Goals of Care, Overwhelming Symptoms, Psychosocial Distress, and End Stage Disease    HISTORY OF PRESENT ILLNESS (HPI):   Sujata Ernandez is a 79 y.o. female  who was admitted on 2/24/2024 from home with a diagnosis of AMS, hypotension, stroke like symptoms. Head CT neg. She was placed on pressers but now off.    PMH:recurrent UTI, frequent falls, aortic stenosis s/p TAVR, CVA, HLD, HTN, [place], chronic lymphocytic colitis, debility, obesity, AAA, leg wounds, PAD, CAD, chronic back pain,     Psychosocial: pt lives at home with daughter Valarie. House is designed with an inlaw suite. She has 3 children who are all involved in care. She has 2 hired caregivers 4 days a week and family is considering additional care for the evenings to give a break to daughter.    PALLIATIVE DIAGNOSES:    delirium  hypoalbuminemia  Fall risk  Recurrent UTI  Diarrhea  Physical debility  Palliative Care Encounter    Pertinent Hospital Diagnoses:  Principal Problem:    Shock (HCC)  Active Problems:    Coronary artery disease involving native coronary artery of native heart    Idiopathic peripheral neuropathy    History of aortic valve replacement with tissue graft    Psoriasis    HTN (hypertension), benign    Chronic colitis    Hx of completed stroke    Confusion    Infrarenal abdominal aortic aneurysm (AAA) without rupture (HCC)    Obese    Hypercholesterolemia    Cerebral microvascular disease    Cerebral atrophy (HCC)    Aortic stenosis    PAD (peripheral artery disease) (HCC)    Polypharmacy    Chronic back pain    Anemia  Resolved Problems:    * No resolved hospital problems.  *      ASSESSMENT AND PLAN:   Pt seen without family present.  Her caregiver Esperanza at the bedside. She stays with the pt from 8am to 4pm daily. Pt unable to participate in discussions due to medical condition. Call placed to daughter Rosalee   Services introduced.   Understanding of Illness/Discussion: daughter feels care needs are maintained in the home and has no concerns. They are considering additional help to give daughter a break. Daughter reports some mild baseline confusion without a diagnosis of dementia. Esperanza states that the pt gets around well with her scooter and they run errands together. She needs help with bathing and incontinence but able to dress herself.  Decisions/Goals address acute issues  Code Status: full code  ACP: 3 children make decisions together. No AMD on file  Plan: continue current care. Hoping delirium will clear up as acute issues resolve  Will follow along with you peripherally  Please call with any palliative questions or concerns.  Palliative Care Team is available via perfect serve or via phone.  Initial consult note routed to primary continuity provider and/or primary health care team members    Plan upon discharge   [] Outpatient Palliative Clinic  [] Home Based Palliative Care  [] Primary Care at Home  [] Hospice at home  [] Hospice at the facility  [] Inpatient hospice ([facility] vs [facility])  [] Rehab (Skilled vs IPR)  [] Facility Long Term Care  [x] Home with Home Health  [] Undetermined at this time      ADVANCE CARE PLANNING:   [x] [facility] Interdisciplinary Team has updated the ACP Navigator with Health Care Decision Maker and Patient Capacity      Primary Decision Maker: Rosalee Ernandez - Child - [phone]    Secondary Decision Maker: Valarie Gonzales - Child - [phone]       Current Code Status: Full Code     .     Goals of Care and Interventions  Patient/Health Care Proxy Stated Goals: Recovery from acute illness  Medical Interventions: Full

## 2024-02-26 NOTE — PLAN OF CARE
Problem: Physical Therapy - Adult  Goal: By Discharge: Performs mobility at highest level of function for planned discharge setting.  See evaluation for individualized goals.  Description: FUNCTIONAL STATUS PRIOR TO ADMISSION: The patient was functional at the wheelchair level and required moderate assistancefor transfers to the chair.  Recent hospitalization here in Jan 2024, discharged to SNF ([facility]) and stayed 3 weeks, has only been home 1 week and was receiving HH.  Per son, she only stand pivot to chair with RW, no ambulation.      HOME SUPPORT PRIOR TO ADMISSION: The patient lived with daughter.  Care aid M-F 8-5    Physical Therapy Goals  Initiated 2/25/2024  1.  Patient will move from supine to sit and sit to supine in bed with minimal assistance within 7 day(s).    2.  Patient will perform sit to stand with minimal assistance within 7 day(s).  3.  Patient will transfer from bed to chair and chair to bed with minimal assistance using the least restrictive device within 7 day(s).    Outcome: Progressing   PHYSICAL THERAPY TREATMENT    Patient: Sujata Ernandez (79 y.o. female)  Date: 2/26/2024  Diagnosis: Encephalopathy [G93.40]  Septic shock (HCC) [A41.9, R65.21]  Hypotension, unspecified hypotension type [I95.9] Shock (HCC)      Precautions: Fall Risk, Weight Bearing, Other (comment), Contact Precautions (c-diff, COVID rule out, CAM boot to Right LE)                      ASSESSMENT:  Patient continues to benefit from skilled PT services and is slowly progressing towards goals.  pt very receptive to cueing from caregiver. Min A to come to sitting EOB. Tolerated sitting x 3 min. Pt is NOT receptive to trial use of YANY STEADY for transfers, caregiver explained pt fearful after attempted use during previous admission. CAM boot not in room. Min A x 2 for sit>stand with RW, demonstrated fwd flexed posture, unable to sequence side steps to HOB. Difficulty with planning lateral scoot toward HOB  family present      COMMUNICATION/EDUCATION:   The patient's plan of care was discussed with: occupational therapy assistant and registered nurse    Patient Education  Education Given To: Patient;Caregiver  Education Provided: Role of Therapy;Transfer Training  Education Method: Verbal  Barriers to Learning: Cognition  Education Outcome: Continued education needed      Valarie Felder, PTA  Minutes: 23

## 2024-02-26 NOTE — PLAN OF CARE
Problem: Pain  Goal: Verbalizes/displays adequate comfort level or baseline comfort level  2/25/2024 1952 by Dmitry Mueller RN  Outcome: Progressing  2/25/2024 1816 by Rufina De La Cruz RN  Outcome: Progressing  Flowsheets (Taken 2/25/2024 0900 by Sami Alcala, RYAN)  Verbalizes/displays adequate comfort level or baseline comfort level:   Encourage patient to monitor pain and request assistance   Assess pain using appropriate pain scale   Administer analgesics based on type and severity of pain and evaluate response   Implement non-pharmacological measures as appropriate and evaluate response     Problem: Safety - Adult  Goal: Free from fall injury  2/25/2024 1952 by Dmitry Mueller RN  Outcome: Progressing  2/25/2024 1816 by Rufina De La Cruz RN  Outcome: Progressing  Flowsheets (Taken 2/25/2024 0800 by Sami Alcala, RN)  Free From Fall Injury: Instruct family/caregiver on patient safety     Problem: Confusion  Goal: Confusion, delirium, dementia, or psychosis is improved or at baseline  Description: INTERVENTIONS:  1. Assess for possible contributors to thought disturbance, including medications, impaired vision or hearing, underlying metabolic abnormalities, dehydration, psychiatric diagnoses, and notify attending LIP  2. [place] high risk fall precautions, as indicated  3. Provide frequent short contacts to provide reality reorientation, refocusing and direction  4. Decrease environmental stimuli, including noise as appropriate  5. Monitor and intervene to maintain adequate nutrition, hydration, elimination, sleep and activity  6. If unable to ensure safety without constant attention obtain sitter and review sitter guidelines with assigned personnel  7. Initiate Psychosocial CNS and Spiritual Care consult, as indicated  2/25/2024 1952 by Dmitry Mueller RN  Outcome: Progressing  2/25/2024 1816 by Rufina De La Cruz RN  Outcome: Progressing  Flowsheets (Taken 2/25/2024 0900 by Sami Alcala, RN)  Effect of thought

## 2024-02-26 NOTE — PLAN OF CARE
bed mod assist x 2 in prep to wash her face with set-up. Pt stood one time briefly than returned herself to bed with assist. Pt is not receptive to use of YANY STEADY.             PLAN :  Patient continues to benefit from skilled intervention to address the above impairments.  Continue treatment per established plan of care to address goals.    Recommend with staff: Adl's, there ex, there act    Recommend next OT session: cont towards goals    Recommendation for discharge: (in order for the patient to meet his/her long term goals): Therapy up to 5 days/week in Skilled nursing facility    Other factors to consider for discharge: impaired cognition and concern for safely navigating or managing the home environment    IF patient discharges home will need the following DME:        SUBJECTIVE:   Patient stated “I have PT where I live.”    OBJECTIVE DATA SUMMARY:   Cognitive/Behavioral Status:  Orientation  Overall Orientation Status: Impaired  Orientation Level: Oriented to person;Disoriented to situation       Functional Mobility and Transfers for ADLs:  Bed Mobility:    Mod assist x 2    Transfers:    Mod assist x 2 sit to stand           Balance:   Impaired standing balance         ADL Intervention:       Grooming: Setup   Grooming Skilled Clinical Factors: washing face seated edge of bed       LE Dressing: Dependent/Total      Skin Care: Chlorhexidine wipes;Bath wipes;Lotion        Activity Tolerance:   Fair   Please refer to the flowsheet for vital signs taken during this treatment.    After treatment:   Patient left in no apparent distress in bed, Call bell within reach, and Caregiver / family present    COMMUNICATION/EDUCATION:   The patient's plan of care was discussed with: physical therapy assistant and occupational therapist    Patient Education  Education Given To: Patient  Education Provided: Role of Therapy;Plan of Care  Education Method: Verbal  Barriers to Learning: Cognition  Education Outcome: Continued  education needed    Thank you for this referral.  JUDIE Brito/L  Minutes: 20

## 2024-02-26 NOTE — PROGRESS NOTES
[facility]    Hospitalist Progress Note    NAME: Sujata Ernandez   :  1945  MRM:  [id number]    Date/Time of service 2024  8:13 AM    To assist coordination of care and communication with nursing and staff, this note may be preliminary early in the day, but finalized by end of the day.        Assessment and Plan:     Shock - POA, resolved. I doubt septic. She had normal lactate, no SIRS criteria, no focus of infection, procalcitonin very low.  I stopped antibiotics.  I suspect IVVD from diarrhea (from prior use of abx) and polypharmacy contribute.  Hydrate. Hold meds.  Weaned off levophed this AM.       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 CT/CTA head normal.  For now hold off MRI and neurology consult.  Required sedation for sundowning    Chronic back pain / Idiopathic peripheral neuropathy / Polypharmacy - POA.  PT eval.  For now hold off baclofen and resume low dose gabapentin    Diarrhea / Chronic colitis / Hypoalbuminemia - Followed by GI, resume budesonide, colestipol and pancreatic enzymes.  Tested positive for C diff, but I think this is colonizaiton related to prior Abx.  I do not think she has C diff colitis. Added PO vanco to suppress.  Stopped all other Abx.  Added probiotic    Anemia - POA and mild, will worsen with hydration. Check serologies.    Coronary artery disease / Hypertension - BP low, but otherwise stable.  Continue ASA. As tolerated, resume coreg, but hold lisinopril and lasix. LDL 65    Leg wounds / Psoriasis / LE edema / Peripheral artery disease / Hypercholesterolemia - Wound consult.  Stop lasix.  Continue atorvastatin when eating.  I do not think this is an acute bacterial infection.    Aortic stenosis / History of aortic valve replacement - Appears stable.        Infrarenal abdominal

## 2024-02-26 NOTE — CARE COORDINATION
02/26/24 1621   Service Assessment   Patient Orientation Unable to Assess   History Provided By Child/Family   Primary Caregiver Family   Support Systems Children   Patient's Healthcare Decision Maker is: Legal Next of Kin   PCP Verified by CM Yes   Last Visit to PCP Within last 3 months   Can patient return to prior living arrangement Unknown at present   Family able to assist with home care needs: Yes   Would you like for me to discuss the discharge plan with any other family members/significant others, and if so, who? Yes  (Rosalee Ernandez (Child) [phone])   Social/Functional History   Lives With Family      CM completed initial assessment. Pt recently was discharged from [facility]. Pt was discharged with [facility] PT/OT and SN. Per Pt's daughter, Pt has caregivers in her home. Daughter is agreeable to Pt discharging home with resumption of care with [facility]. [facility]     Family to transport at discharge.       TONA Anne, HERMELINDA  [facility] Care Manager  [phone]

## 2024-02-27 ENCOUNTER — APPOINTMENT (OUTPATIENT)
Facility: HOSPITAL | Age: 79
DRG: 640 | End: 2024-02-27
Attending: INTERNAL MEDICINE
Payer: MEDICARE

## 2024-02-27 VITALS
BODY MASS INDEX: 33.67 KG/M2 | DIASTOLIC BLOOD PRESSURE: 74 MMHG | WEIGHT: 182.98 LBS | HEIGHT: 62 IN | TEMPERATURE: 97.9 F | HEART RATE: 86 BPM | OXYGEN SATURATION: 96 % | RESPIRATION RATE: 16 BRPM | SYSTOLIC BLOOD PRESSURE: 119 MMHG

## 2024-02-27 LAB
G LAMBLIA AG STL QL IA: NEGATIVE
GLUCOSE BLD STRIP.AUTO-MCNC: 96 MG/DL (ref 65–117)
O+P STL CONC: NORMAL
SERVICE CMNT-IMP: NORMAL

## 2024-02-27 PROCEDURE — 6370000000 HC RX 637 (ALT 250 FOR IP): Performed by: INTERNAL MEDICINE

## 2024-02-27 PROCEDURE — 94761 N-INVAS EAR/PLS OXIMETRY MLT: CPT

## 2024-02-27 PROCEDURE — 2580000003 HC RX 258: Performed by: INTERNAL MEDICINE

## 2024-02-27 PROCEDURE — 93005 ELECTROCARDIOGRAM TRACING: CPT | Performed by: EMERGENCY MEDICINE

## 2024-02-27 PROCEDURE — 82962 GLUCOSE BLOOD TEST: CPT

## 2024-02-27 RX ORDER — FIDAXOMICIN 200 MG/1
200 TABLET, FILM COATED ORAL 2 TIMES DAILY
Qty: 14 TABLET | Refills: 0 | Status: SHIPPED | OUTPATIENT
Start: 2024-02-27 | End: 2024-03-05

## 2024-02-27 RX ORDER — ASPIRIN 81 MG/1
81 TABLET, CHEWABLE ORAL DAILY
Status: DISCONTINUED | OUTPATIENT
Start: 2024-02-27 | End: 2024-02-27 | Stop reason: HOSPADM

## 2024-02-27 RX ORDER — ATORVASTATIN CALCIUM 20 MG/1
80 TABLET, FILM COATED ORAL DAILY
Status: DISCONTINUED | OUTPATIENT
Start: 2024-02-27 | End: 2024-02-27 | Stop reason: HOSPADM

## 2024-02-27 RX ORDER — LISINOPRIL 5 MG/1
5 TABLET ORAL DAILY
Status: DISCONTINUED | OUTPATIENT
Start: 2024-02-27 | End: 2024-02-27 | Stop reason: HOSPADM

## 2024-02-27 RX ORDER — BACLOFEN 10 MG/1
5 TABLET ORAL 3 TIMES DAILY PRN
Status: DISCONTINUED | OUTPATIENT
Start: 2024-02-27 | End: 2024-02-27 | Stop reason: HOSPADM

## 2024-02-27 RX ORDER — CARVEDILOL 12.5 MG/1
25 TABLET ORAL 2 TIMES DAILY WITH MEALS
Status: DISCONTINUED | OUTPATIENT
Start: 2024-02-27 | End: 2024-02-27 | Stop reason: HOSPADM

## 2024-02-27 RX ORDER — GABAPENTIN 100 MG/1
200 CAPSULE ORAL NIGHTLY
Status: DISCONTINUED | OUTPATIENT
Start: 2024-02-27 | End: 2024-02-27 | Stop reason: HOSPADM

## 2024-02-27 RX ADMIN — VANCOMYCIN HYDROCHLORIDE 125 MG: KIT at 00:12

## 2024-02-27 RX ADMIN — VANCOMYCIN HYDROCHLORIDE 125 MG: KIT at 06:21

## 2024-02-27 RX ADMIN — SODIUM CHLORIDE, POTASSIUM CHLORIDE, SODIUM LACTATE AND CALCIUM CHLORIDE: 600; 310; 30; 20 INJECTION, SOLUTION INTRAVENOUS at 08:18

## 2024-02-27 RX ADMIN — CARVEDILOL 25 MG: 12.5 TABLET, FILM COATED ORAL at 10:06

## 2024-02-27 RX ADMIN — ASPIRIN 81 MG: 81 TABLET, CHEWABLE ORAL at 10:04

## 2024-02-27 RX ADMIN — POTASSIUM BICARBONATE 20 MEQ: 782 TABLET, EFFERVESCENT ORAL at 08:18

## 2024-02-27 RX ADMIN — VANCOMYCIN HYDROCHLORIDE 125 MG: KIT at 11:45

## 2024-02-27 RX ADMIN — PANCRELIPASE LIPASE, PANCRELIPASE PROTEASE, PANCRELIPASE AMYLASE 40000 UNITS: 20000; 63000; 84000 CAPSULE, DELAYED RELEASE ORAL at 11:45

## 2024-02-27 RX ADMIN — ATORVASTATIN CALCIUM 80 MG: 20 TABLET, FILM COATED ORAL at 10:04

## 2024-02-27 RX ADMIN — PANCRELIPASE LIPASE, PANCRELIPASE PROTEASE, PANCRELIPASE AMYLASE 40000 UNITS: 20000; 63000; 84000 CAPSULE, DELAYED RELEASE ORAL at 08:18

## 2024-02-27 RX ADMIN — BUDESONIDE 3 MG: 3 CAPSULE, GELATIN COATED ORAL at 08:25

## 2024-02-27 RX ADMIN — SODIUM CHLORIDE, PRESERVATIVE FREE 10 ML: 5 INJECTION INTRAVENOUS at 08:17

## 2024-02-27 RX ADMIN — Medication 1 CAPSULE: at 08:18

## 2024-02-27 RX ADMIN — LISINOPRIL 5 MG: 5 TABLET ORAL at 10:04

## 2024-02-27 NOTE — PALLIATIVE CARE DISCHARGE
Goals of Care/Treatment Preferences    The Palliative Medicine team was consulted as part of your/your loved one's care in the hospital. Our team is a supportive service; we strive to relieve suffering and improve quality of life.    We reviewed advance care planning information, which includes the following:    Primary Decision Maker: Rosalee Ernandez - Child - [phone]    Primary Decision Maker: GonzalesValarie - Child  [phone]    Primary Decision Maker: Luis Serrano - Child  [phone]  Patient's Healthcare Decision Maker is:: Legal Next of Kin    Patient/Health Care Proxy Stated Goals: Recovery from acute illness    We reviewed / discussed your code status as:   Code Status: Full Code     “Full Code” means perform CPR in the event of cardiac arrest.      “DNR” means do NOT perform CPR in the event of cardiac arrest.      “Partial Code” means you have specific preferences, please discuss with your healthcare team.      “No Order” means this issue was not addressed / resolved during your stay    Medical Interventions: Full interventions    Artificially Administered Nutrition: No feeding tube    Because of the importance of this information, we are providing you with a printed copy to share with other healthcare providers after this hospitalization is complete.

## 2024-02-27 NOTE — PROGRESS NOTES
Discharge instructions provide to patient daughter Valarie Gonzales. Patient verbalizes understanding on discharge instructions provide.

## 2024-02-27 NOTE — DISCHARGE SUMMARY
Physician Discharge Summary     Patient ID:  Sujata Ernandez  [id number]  79 y.o.  1945    Admit date: 2/24/2024    Discharge date of service and time: 2/27/2024  Greater than 30 minutes were spent providing discharge related services for this patient    Admission Diagnoses: Encephalopathy [G93.40]  Septic shock (HCC) [A41.9, R65.21]  Hypotension, unspecified hypotension type [I95.9]    Discharge Diagnoses:    Principal Diagnosis   Shock (HCC)                                             Hospital Course and other diagnoses  Shock - POA, resolved. Never septic. She had normal lactate, no SIRS criteria, no focus of infection, procalcitonin very low.  I stopped antibiotics.  I suspect IVVD from diarrhea (from prior use of abx) and polypharmacy contribute.  Hydrated. Hold meds.      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 CT/CTA head normal.  This did not warrant MRI and neurology consult.  Required sedation in hospital for sundowning     Chronic back pain / Idiopathic peripheral neuropathy / Polypharmacy - POA.  PT eval.  Can resume baclofen and resume low dose gabapentin     Diarrhea / Chronic colitis / Hypoalbuminemia - Followed by GI, resume budesonide, colestipol and pancreatic enzymes.  Tested positive for C diff, but I think this is colonizaiton related to prior Abx.  I do not think she has C diff colitis.  I added PO vanco to suppress.  Stopped all other Abx.  Added probiotic     Anemia - POA and mild, will worsen with hydration. Check serologies.     Coronary artery disease / Hypertension - BP low, but otherwise stable.  Continue ASA. As tolerated, resume coreg and lisinopril. Stop lasix. LDL 65     Leg wounds / Psoriasis / LE edema / Peripheral artery disease / Hypercholesterolemia - Wound consult.  Stop lasix.  Continue atorvastatin when  eating.  I do not think this is an acute bacterial infection.     Aortic stenosis / History of aortic valve replacement - Appears stable.        Infrarenal abdominal aortic aneurysm (AAA) without rupture - Outpatient follow up      Obese - Advise weight loss.     PCP: Amanda Paulino APRN - NP    Consults: cardiology and pulmonary/intensive care    Significant Diagnostic Studies: See Hospital Course    Discharged home in improved condition.    Discharge Exam:  BP: (!) 110/97   Pulse: 95   Resp: 16   Temp: 97.3 °F (36.3 °C)   TempSrc: Oral   SpO2: 96%   Weight:  83 kg   Height:  157 cm         Gen:  Obese, frail, in no acute distress  HEENT:  Pink conjunctivae, PERRL, hearing intact to voice, moist mucous membranes  Neck:  Supple, without masses, thyroid non-tender  Resp:  No accessory muscle use, clear breath sounds without wheezes rales or rhonchi  Card:  No murmurs, tachcyardic S1, S2 without thrills, bruits, LE peripheral edema  Abd:  Soft, non-tender, non-distended, normoactive bowel sounds are present, no mass  Lymph:  No cervical or inguinal adenopathy  Musc:  No cyanosis or clubbing  Skin:  No rashes or ulcers, skin turgor is good  Neuro:  Cranial nerves are grossly intact, general motor weakness, follows commands   Psych:  Poor insight, oriented to person, agitated    Patient Instructions:   Current Discharge Medication List        START taking these medications    Details   Fidaxomicin (DIFICID) 200 MG TABS tablet Take 200 mg by mouth 2 times daily for 7 days  Qty: 14 tablet, Refills: 0           CONTINUE these medications which have NOT CHANGED    Details   potassium chloride (K-TAB) 20 MEQ TBCR extended release tablet       Baclofen (LIORESAL) 5 MG tablet       carvedilol (COREG) 25 MG tablet Take 1 tablet by mouth 2 times daily (with meals)      magnesium oxide (MAG-OX) 400 (240 Mg) MG tablet Take 1 tablet by mouth 2 times daily      gabapentin (NEURONTIN) 600 MG tablet Take 1 tablet by mouth

## 2024-02-27 NOTE — PROGRESS NOTES
Spiritual Care Assessment/Progress Note  [facility]    Name: Sujata Ernandez MRN: [id number]    Age: 79 y.o.     Sex: female   Language: English     Date: 2/27/2024            Total Time Calculated: 36 min              Spiritual Assessment begun in [facility] B5 MULTI-SPECIALTY ONCOLOGY 1  Service Provided For:: Patient (and caregiver)     Encounter Overview/Reason : Initial Encounter    Spiritual beliefs:      [x] Involved in a chele tradition/spiritual practice:  Moravian      [x] Supported by a chele community: [place]      [] Claims no spiritual orientation:      [] Seeking spiritual identity:           [] Adheres to an individual form of spirituality:      [] Not able to assess:                Identified resources for coping and support system:   Support System: Children, Family members, Home care staff       [x] Prayer                  [] Devotional reading               [] Music                  [] Guided Imagery     [] Pet visits                                        [] Other: (COMMENT)     Specific area/focus of visit   Encounter:    Crisis:    Spiritual/Emotional needs: Type: Spiritual Support  Ritual, Rites and Sacraments:    Grief, Loss, and Adjustments:    Ethics/Mediation:    Behavioral Health:    Palliative Care:    Advance Care Planning:           Narrative:   Spiritual care assessment with Ms. Sujata Ernandez on B5.  Assessed chart prior to encounter. Met Pt at bedside; she was sleeping at first but has her caregiver bedside and also sitter in the room. Caregiver shared much of the situation and Ms Ernandez woke and happily talked with , although cognitive challenges going on. She was not able to say for certain how many children she has but did talked about them by name during the visit. She loved talking about her family, who bring her much comfort and hope. 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 Pt and

## 2024-02-27 NOTE — PROGRESS NOTES
Rapid Called at 0121    Responded to RRT at 0122 for Hypertension    Provider at bedside: NO  Interventions ordered: EKG and Other (Comment)   Sepsis Suspected: No  Transfer to Higher Level of Care: no  Blood Glucose: 96     Vitals:    02/27/24 0128   BP: (!) 149/93   Pulse: 94   Resp: 20   Temp: 98.4 °F (36.9 °C)   SpO2: 100%   Rapid called for hypertensive readings and inability to obtain SPO2 from monitor. Pt alert, oriented, skin warm and dry, good color. Upon RRT arrival pt BP improved, on 6L NC and EKG in progress. SPO2 changed and readings 100%. Encouraged nurse to reach out to RRT for further concerns.    Rapid Ended at 0130  RRT RN assisted with transport to accepting unit MARGARITA Avilez RN

## 2024-02-27 NOTE — PROGRESS NOTES
Physician Progress Note      PATIENT:               TISHA MASSEY  CSN #:                  [id number]  :                       1945  ADMIT DATE:       2024 1:07 PM  DISCH DATE:        2024 1:43 PM  RESPONDING  PROVIDER #:        Wilver Teran MD          QUERY TEXT:    Good afternoon  Pt admitted with shock.    If possible, please document in progress notes and discharge summary further   specificity regarding the type of shock:    The medical record reflects the following:  Risk Factors: age, diarrhea, vascular dementia, CAD/ PAD, HTN  Clinical Indicators: Patient presented with AMS/ confusion. Noted with   somnolence with hypotension and slurred speech and generalized weakness. BP   down to 92/24 and started on Levophed   H&P \"Shock POA: Lactic acid within normal limits.  Unclear etiology but   concern for recurrent UTI.  Also possibly due to lower extremity wounds.  UA   with no evidence of infection\"   PN \"Shock - POA, I doubt septic.\"  Treatment: daily labs, Ua, IVF bolus, IV Levophed, ID consult, palliative   consult, ICU care    Thank you  Shanita Last RN [facility]  [phone]  Options provided:  -- Cardiogenic Shock  -- Neurogenic Shock  -- Obstructive Shock  -- Septic Shock  -- Hypovolemic Shock  -- Other - I will add my own diagnosis  -- Disagree - Not applicable / Not valid  -- Disagree - Clinically unable to determine / Unknown  -- Refer to Clinical Documentation Reviewer    PROVIDER RESPONSE TEXT:    This patient is in hypovolemic shock.    Query created by: Shanita Last on 2024 5:00 PM      Electronically signed by:  Wilver Teran MD 2024 3:31 PM

## 2024-02-27 NOTE — CARE COORDINATION
02/27/24 1134   Services At/After Discharge   Transition of Care Consult (CM Consult) Discharge Planning   Services At/After Discharge Home Health  ([facility]: PT/OT & SN)   Mode of Transport at Discharge Other (see comment)  (daughter)   Confirm Follow Up Transport Family   Condition of Participation: Discharge Planning   The Patient and/or Patient Representative was provided with a Choice of Provider? Patient Representative   Name of the Patient Representative who was provided with the Choice of Provider and agrees with the Discharge Plan?  OmaRosalee (Child) [phone]   The Patient and/Or Patient Representative agree with the Discharge Plan? Yes   Freedom of Choice list was provided with basic dialogue that supports the patient's individualized plan of care/goals, treatment preferences, and shares the quality data associated with the providers?  Yes       CM notified of Pt discharging today. No further discharge needs indicated at this time. Pt is cleared from CM standpoint.     TONA Anne, CM  [facility] Care Manager  [phone]

## 2024-02-27 NOTE — DISCHARGE INSTRUCTIONS
HOSPITALIST DISCHARGE INSTRUCTIONS  NAME:  Sujata Ernandez   :  1945   MRN:  [id number]     Date/Time:  2024 11:26 AM    ADMIT DATE: 2024     DISCHARGE DATE: 2024     DISCHARGE DIAGNOSIS:  Shock     DISCHARGE INSTRUCTIONS:  Thank you for allowing us to participate in your care. Your discharging Hospitalist is Wilver Teran MD. You were admitted for evaluation and treatment of the following:    Shock - This was due to dehydration, due to diarrhea.  You do not need antibiotics.  We gave you hydration and it resolved.      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 CT/CTA head normal.  This did not warrant MRI and neurology consult.  Required sedation in hospital for      Chronic back pain / Idiopathic peripheral neuropathy / Polypharmacy - POA.  PT eval.  Can resume baclofen and resume low dose gabapentin     Diarrhea / Chronic colitis / Hypoalbuminemia - Followed by GI, resume budesonide, colestipol and pancreatic enzymes.  Tested positive for C diff, but I think this is colonizaiton related to prior Abx.  I do not think she has C diff colitis.  I added PO vanco to suppress.  Stopped all other Abx.  Added probiotic     Anemia - POA and mild, will worsen with hydration. Check serologies.     Coronary artery disease / Hypertension - BP low, but otherwise stable.  Continue ASA. As tolerated, resume coreg and lisinopril. Stop lasix. LDL 65     Leg wounds / Psoriasis / LE edema / Peripheral artery disease / Hypercholesterolemia - Wound consult.  Stop lasix.  Continue atorvastatin when eating.  I do not think this is an acute bacterial infection.     Aortic stenosis / History of aortic valve replacement - Appears stable.        Infrarenal abdominal aortic aneurysm (AAA) without rupture - Outpatient follow up      Obese - Advise  weight loss.      MEDICATIONS:    It is important that you take the medication exactly as they are prescribed.   Keep your medication in the bottles provided by the pharmacist and keep a list of the medication names, dosages, and times to be taken in your wallet.   Do not take other medications without consulting your doctor.             If you experience any of the following symptoms then please call your primary care physician or return to the emergency room if you cannot get hold of your doctor:  Fever, chills, nausea, vomiting, diarrhea, change in mentation, falling, bleeding, shortness of breath    Follow Up:  Please call the below provider to arrange hospital follow up appointment      Amanda Paulino, APRN - [place]  [id number] [id number] [phone]    Schedule an appointment as soon as possible for a visit in 1 week(s)        For questions regarding your Hospitalization or to contact the Hospital Medicine team, please call [phone].      Information obtained by :  I understand that if any problems occur once I am at home I am to contact my physician.    I understand and acknowledge receipt of the instructions indicated above.                                                                                                                                           Physician's or R.N.'s Signature                                                                  Date/Time                                                                                                                                              Patient or Representative Signature                                                          Date/Time

## 2024-02-27 NOTE — PROGRESS NOTES
[facility]    Hospitalist Progress Note    NAME: Sujata Ernandez   :  1945  MRM:  [id number]    Date/Time of service 2024  8:34 AM    To assist coordination of care and communication with nursing and staff, this note may be preliminary early in the day, but finalized by end of the day.        Assessment and Plan:     Shock - POA, resolved. Never septic. She had normal lactate, no SIRS criteria, no focus of infection, procalcitonin very low.  I stopped antibiotics.  I suspect IVVD from diarrhea (from prior use of abx) and polypharmacy contribute.  Hydrated. Hold meds.      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 CT/CTA head normal.  This did not warrant MRI and neurology consult.  Required sedation in hospital for     Chronic back pain / Idiopathic peripheral neuropathy / Polypharmacy - POA.  PT eval.  Can resume baclofen and resume low dose gabapentin    Diarrhea / Chronic colitis / Hypoalbuminemia - Followed by GI, resume budesonide, colestipol and pancreatic enzymes.  Tested positive for C diff, but I think this is colonizaiton related to prior Abx.  I do not think she has C diff colitis.  I added PO vanco to suppress.  Stopped all other Abx.  Added probiotic    Anemia - POA and mild, will worsen with hydration. Check serologies.    Coronary artery disease / Hypertension - BP low, but otherwise stable.  Continue ASA. As tolerated, resume coreg and lisinopril. Stop lasix. LDL 65    Leg wounds / Psoriasis / LE edema / Peripheral artery disease / Hypercholesterolemia - Wound consult.  Stop lasix.  Continue atorvastatin when eating.  I do not think this is an acute bacterial infection.    Aortic stenosis / History of aortic valve replacement - Appears stable.        Infrarenal abdominal aortic aneurysm (AAA)

## 2024-02-27 NOTE — ACP (ADVANCE CARE PLANNING)
Primary Decision Maker: OmaRosalee - Child - [phone]    Primary Decision Maker: ChristianValarie - Child - [phone]    Primary Decision Maker: Luis Serrano - Child - [phone]    Pt does not have AMD on file. In absence of verified Medical POA, adult children are legal NOK and surrogate decision makers if pt is unable to speak for herself.

## 2024-02-28 LAB
EKG ATRIAL RATE: 101 BPM
EKG DIAGNOSIS: NORMAL
EKG P AXIS: 65 DEGREES
EKG P-R INTERVAL: 176 MS
EKG Q-T INTERVAL: 388 MS
EKG QRS DURATION: 92 MS
EKG QTC CALCULATION (BAZETT): 487 MS
EKG R AXIS: -7 DEGREES
EKG T AXIS: 71 DEGREES
EKG VENTRICULAR RATE: 95 BPM
FOLATE SERPL-MCNC: 17.1 NG/ML

## 2024-02-28 PROCEDURE — 93010 ELECTROCARDIOGRAM REPORT: CPT | Performed by: STUDENT IN AN ORGANIZED HEALTH CARE EDUCATION/TRAINING PROGRAM

## 2024-03-01 LAB
BACTERIA SPEC CULT: NORMAL
BACTERIA SPEC CULT: NORMAL
SERVICE CMNT-IMP: NORMAL
SERVICE CMNT-IMP: NORMAL

## 2024-03-04 ENCOUNTER — TELEPHONE (OUTPATIENT)
Facility: CLINIC | Age: 79
End: 2024-03-04

## 2024-03-04 LAB
O+P SPEC MICRO: NORMAL
O+P STL CONC: NORMAL
SPECIMEN SOURCE: NORMAL

## 2024-03-04 NOTE — TELEPHONE ENCOUNTER
Pt daughter called in regards to trying to get mother in for a hospital follow up. Pt was discharged from the ICU on 02/27/2024 for blood pressure issues, a UTI, and C-Diff. No availability until next week.

## 2024-03-13 ENCOUNTER — OFFICE VISIT (OUTPATIENT)
Facility: CLINIC | Age: 79
End: 2024-03-13
Payer: MEDICARE

## 2024-03-13 VITALS
SYSTOLIC BLOOD PRESSURE: 120 MMHG | DIASTOLIC BLOOD PRESSURE: 74 MMHG | OXYGEN SATURATION: 97 % | TEMPERATURE: 99.1 F | HEART RATE: 74 BPM

## 2024-03-13 DIAGNOSIS — R60.0 LOWER EXTREMITY EDEMA: ICD-10-CM

## 2024-03-13 DIAGNOSIS — G93.40 ENCEPHALOPATHY: ICD-10-CM

## 2024-03-13 DIAGNOSIS — I10 PRIMARY HYPERTENSION: ICD-10-CM

## 2024-03-13 DIAGNOSIS — Z09 HOSPITAL DISCHARGE FOLLOW-UP: Primary | ICD-10-CM

## 2024-03-13 DIAGNOSIS — G60.9 IDIOPATHIC PERIPHERAL NEUROPATHY: ICD-10-CM

## 2024-03-13 PROCEDURE — 1111F DSCHRG MED/CURRENT MED MERGE: CPT | Performed by: FAMILY MEDICINE

## 2024-03-13 PROCEDURE — G8417 CALC BMI ABV UP PARAM F/U: HCPCS | Performed by: FAMILY MEDICINE

## 2024-03-13 PROCEDURE — 1090F PRES/ABSN URINE INCON ASSESS: CPT | Performed by: FAMILY MEDICINE

## 2024-03-13 PROCEDURE — 3074F SYST BP LT 130 MM HG: CPT | Performed by: FAMILY MEDICINE

## 2024-03-13 PROCEDURE — G8400 PT W/DXA NO RESULTS DOC: HCPCS | Performed by: FAMILY MEDICINE

## 2024-03-13 PROCEDURE — 3078F DIAST BP <80 MM HG: CPT | Performed by: FAMILY MEDICINE

## 2024-03-13 PROCEDURE — G8427 DOCREV CUR MEDS BY ELIG CLIN: HCPCS | Performed by: FAMILY MEDICINE

## 2024-03-13 PROCEDURE — 4004F PT TOBACCO SCREEN RCVD TLK: CPT | Performed by: FAMILY MEDICINE

## 2024-03-13 PROCEDURE — 1123F ACP DISCUSS/DSCN MKR DOCD: CPT | Performed by: FAMILY MEDICINE

## 2024-03-13 PROCEDURE — 99215 OFFICE O/P EST HI 40 MIN: CPT | Performed by: FAMILY MEDICINE

## 2024-03-13 PROCEDURE — G8484 FLU IMMUNIZE NO ADMIN: HCPCS | Performed by: FAMILY MEDICINE

## 2024-03-13 RX ORDER — GABAPENTIN 300 MG/1
300 CAPSULE ORAL 3 TIMES DAILY
Qty: 270 CAPSULE | Refills: 1 | Status: SHIPPED | OUTPATIENT
Start: 2024-03-13 | End: 2024-09-09

## 2024-03-13 RX ORDER — TRAMADOL HYDROCHLORIDE 50 MG/1
50 TABLET ORAL
COMMUNITY
Start: 2024-02-14

## 2024-03-13 RX ORDER — FUROSEMIDE 40 MG/1
40 TABLET ORAL DAILY PRN
Qty: 1 TABLET | Refills: 0
Start: 2024-03-13

## 2024-03-13 SDOH — ECONOMIC STABILITY: FOOD INSECURITY: WITHIN THE PAST 12 MONTHS, YOU WORRIED THAT YOUR FOOD WOULD RUN OUT BEFORE YOU GOT MONEY TO BUY MORE.: NEVER TRUE

## 2024-03-13 SDOH — ECONOMIC STABILITY: FOOD INSECURITY: WITHIN THE PAST 12 MONTHS, THE FOOD YOU BOUGHT JUST DIDN'T LAST AND YOU DIDN'T HAVE MONEY TO GET MORE.: NEVER TRUE

## 2024-03-13 SDOH — ECONOMIC STABILITY: HOUSING INSECURITY
IN THE LAST 12 MONTHS, WAS THERE A TIME WHEN YOU DID NOT HAVE A STEADY PLACE TO SLEEP OR SLEPT IN A SHELTER (INCLUDING NOW)?: NO

## 2024-03-13 SDOH — ECONOMIC STABILITY: INCOME INSECURITY: HOW HARD IS IT FOR YOU TO PAY FOR THE VERY BASICS LIKE FOOD, HOUSING, MEDICAL CARE, AND HEATING?: NOT HARD AT ALL

## 2024-03-13 NOTE — PROGRESS NOTES
Chief Complaint   Patient presents with    Follow-Up from [facility] 2/24/24-2/27/24       \"Have you been to the ER, urgent care clinic since your last visit?  Hospitalized since your last visit?\"    YES - When: approximately 1 months ago.  Where and Why: [place] 2/24/24-2/27/24.    “Have you seen or consulted any other health care providers outside of [facility] since your last visit?”    Yes, [facility] on 3/11/24 for back.         
I10       4. Lower extremity edema  R60.0       5. Idiopathic peripheral neuropathy  G60.9 gabapentin (NEURONTIN) 300 MG capsule         1. Hospital discharge follow-up:   - TX DISCHARGE MEDS RECONCILED W/ CURRENT OUTPATIENT MED LIST    2. Encephalopathy: Resolved. She is not back to her prior baseline from before she went in the hospital the first time, but this may be her new normal.     3. Primary hypertension: BP wnl today. Will continue to hold lisinopril and if her home BP's start trending up they will call the office for further instructions.    4. Lower extremity edema: Discussed that if this isn't bothering her, she does not need to take Lasix. Some days it does bother her, however, so will have her start taking Lasix prn and increasing fluid intake, especially on days when she takes this.   - Resume Lasix 40mg, now daily prn    5. Idiopathic peripheral neuropathy: Stable on current medication. Continue gabapentin. They are trying to use this sparingly given concerns for polypharmacy contributing to encephalopathy picture in hospital.   - gabapentin (NEURONTIN) 300 MG capsule; Take 1 capsule by mouth 3 times daily for 180 days. Intended supply: 90 days Max Daily Amount: 900 mg  Dispense: 270 capsule; Refill: 1       RTC in 6 weeks for follow-up with PCP, or sooner prn    A total of 50 minutes was spent on this encounter, including 25 minutes obtaining the history and physical exam, 5 minutes reviewing the chart for hospital notes, labs, and imaging, 5 minutes discussing treatment options, and 10 minutes documenting.     Discussed diagnoses in detail with patient and caregiver.   Medication risks/benefits/side effects discussed with patient and caregiver.  All of the patient's questions were addressed. The patient and caregiver understand and agree with our plan of care.  The patient and caregiver know to call back if they are unsure of or forget any changes we discussed today or if the symptoms change.  The

## 2024-03-26 ENCOUNTER — TELEPHONE (OUTPATIENT)
Facility: CLINIC | Age: 79
End: 2024-03-26

## 2024-03-26 NOTE — TELEPHONE ENCOUNTER
I have sent this electronically twice already . I manually faxed it again today to fax # [phone]

## 2024-03-26 NOTE — TELEPHONE ENCOUNTER
Patients daughter called on behalf of patient. She stated the last time they were here was 3/13/24 with [facility]EE. She stated she received a referral to see [facility] but the place never received it so they cannot see the patient. Please send a referral to them if possible. Please call patients daughter if it is sent at [phone].

## 2024-04-10 ENCOUNTER — TELEPHONE (OUTPATIENT)
Facility: CLINIC | Age: 79
End: 2024-04-10

## 2024-04-10 NOTE — TELEPHONE ENCOUNTER
Patient daughter called in because home health went to visit her mom yesterday and they believe that she needs a automatic lift if possible to transport her back and forth because the way theyu are doing it right now she is being bruised       She said home health told her to contact her pcp to get a order and to make sure the pcp agrees that she needs it as well.

## 2024-04-10 NOTE — TELEPHONE ENCOUNTER
Daughter called back and states that they do need a F2F visit with [facility], Daughter transferred to  to schedule.

## 2024-04-10 NOTE — TELEPHONE ENCOUNTER
Daughter advised. Daughter will call home health to check on this.   Daughter will let us know. And if not she will schedule F2F with [facility].

## 2024-04-24 ENCOUNTER — OFFICE VISIT (OUTPATIENT)
Facility: CLINIC | Age: 79
End: 2024-04-24
Payer: MEDICARE

## 2024-04-24 VITALS — RESPIRATION RATE: 16 BRPM | TEMPERATURE: 97.2 F

## 2024-04-24 DIAGNOSIS — E87.6 HYPOKALEMIA: ICD-10-CM

## 2024-04-24 DIAGNOSIS — R23.3 EASY BRUISING: ICD-10-CM

## 2024-04-24 DIAGNOSIS — Z23 ENCOUNTER FOR IMMUNIZATION: ICD-10-CM

## 2024-04-24 DIAGNOSIS — M54.9 CHRONIC BACK PAIN, UNSPECIFIED BACK LOCATION, UNSPECIFIED BACK PAIN LATERALITY: ICD-10-CM

## 2024-04-24 DIAGNOSIS — G60.9 IDIOPATHIC PERIPHERAL NEUROPATHY: Primary | ICD-10-CM

## 2024-04-24 DIAGNOSIS — G89.29 CHRONIC BACK PAIN, UNSPECIFIED BACK LOCATION, UNSPECIFIED BACK PAIN LATERALITY: ICD-10-CM

## 2024-04-24 DIAGNOSIS — N39.45 CONTINUOUS LEAKAGE OF URINE: ICD-10-CM

## 2024-04-24 DIAGNOSIS — Z74.3 REQUIRES CONTINUOUS SUPERVISION FOR ACTIVITIES OF DAILY LIVING (ADL): ICD-10-CM

## 2024-04-24 DIAGNOSIS — R60.0 LOWER EXTREMITY EDEMA: ICD-10-CM

## 2024-04-24 DIAGNOSIS — R53.1 GENERALIZED WEAKNESS: ICD-10-CM

## 2024-04-24 DIAGNOSIS — Z91.81 AT HIGH RISK FOR FALLS: ICD-10-CM

## 2024-04-24 DIAGNOSIS — J90 PLEURAL EFFUSION: ICD-10-CM

## 2024-04-24 PROCEDURE — 1123F ACP DISCUSS/DSCN MKR DOCD: CPT | Performed by: NURSE PRACTITIONER

## 2024-04-24 PROCEDURE — 99215 OFFICE O/P EST HI 40 MIN: CPT | Performed by: NURSE PRACTITIONER

## 2024-04-24 PROCEDURE — 4004F PT TOBACCO SCREEN RCVD TLK: CPT | Performed by: NURSE PRACTITIONER

## 2024-04-24 PROCEDURE — G8417 CALC BMI ABV UP PARAM F/U: HCPCS | Performed by: NURSE PRACTITIONER

## 2024-04-24 PROCEDURE — G8400 PT W/DXA NO RESULTS DOC: HCPCS | Performed by: NURSE PRACTITIONER

## 2024-04-24 PROCEDURE — G8427 DOCREV CUR MEDS BY ELIG CLIN: HCPCS | Performed by: NURSE PRACTITIONER

## 2024-04-24 PROCEDURE — 0509F URINE INCON PLAN DOCD: CPT | Performed by: NURSE PRACTITIONER

## 2024-04-24 PROCEDURE — 1090F PRES/ABSN URINE INCON ASSESS: CPT | Performed by: NURSE PRACTITIONER

## 2024-04-24 RX ORDER — METHOCARBAMOL 750 MG/1
TABLET, FILM COATED ORAL
COMMUNITY
Start: 2024-04-22

## 2024-04-24 RX ORDER — CEFPODOXIME PROXETIL 200 MG/1
200 TABLET, FILM COATED ORAL 2 TIMES DAILY
COMMUNITY

## 2024-04-24 ASSESSMENT — PATIENT HEALTH QUESTIONNAIRE - PHQ9
SUM OF ALL RESPONSES TO PHQ9 QUESTIONS 1 & 2: 1
2. FEELING DOWN, DEPRESSED OR HOPELESS: SEVERAL DAYS
SUM OF ALL RESPONSES TO PHQ QUESTIONS 1-9: 1
1. LITTLE INTEREST OR PLEASURE IN DOING THINGS: NOT AT ALL
SUM OF ALL RESPONSES TO PHQ QUESTIONS 1-9: 1

## 2024-04-24 NOTE — PROGRESS NOTES
Chief Complaint   Patient presents with    OTHER     Face to face visit for darian lift     \"Have you been to the ER, urgent care clinic since your last visit?  Hospitalized since your last visit?\"    NO    “Have you seen or consulted any other health care providers outside of [facility] since your last visit?”    NO            Click Here for Release of Records Request   PHQ-9 Total Score: 1 (4/24/2024 11:07 AM)         
          Past Medical History:   Diagnosis Date    Aortic stenosis     Cerebral atrophy (HCC)     Cerebral microvascular disease     Hx of completed stroke     Hypercholesterolemia     Idiopathic peripheral neuropathy     Obese     PAD (peripheral artery disease) (HCC)     Polypharmacy      Family History   Problem Relation Age of Onset    Stroke Maternal Grandmother     Heart Attack Brother     Heart Attack Father     Stroke Mother     Thyroid Disease Daughter     Kidney Disease Son         kidney stone     Social History     Socioeconomic History    Marital status:      Spouse name: Not on file    Number of children: Not on file    Years of education: Not on file    Highest education level: Not on file   Occupational History    Not on file   Tobacco Use    Smoking status: Every Day     Current packs/day: 0.25     Types: Cigarettes    Smokeless tobacco: Never   Vaping Use    Vaping Use: Never used   Substance and Sexual Activity    Alcohol use: Never    Drug use: Never    Sexual activity: Not on file   Other Topics Concern    Not on file   Social History Narrative    Not on file     Social Determinants of Health     Financial Resource Strain: Low Risk  (3/13/2024)    Overall Financial Resource Strain (CARDIA)     Difficulty of Paying Living Expenses: Not hard at all   Food Insecurity: No Food Insecurity (3/13/2024)    Hunger Vital Sign     Worried About Running Out of Food in the Last Year: Never true     Ran Out of Food in the Last Year: Never true   Transportation Needs: No Transportation Needs (3/13/2024)    PRAPARE - Transportation     Lack of Transportation (Medical): No     Lack of Transportation (Non-Medical): No   Physical Activity: Not on file   Stress: Not on file   Social Connections: Not on file   Intimate Partner Violence: Not on file   Housing Stability: Low Risk  (3/13/2024)    Housing Stability Vital Sign     Unable to Pay for Housing in the Last Year: No     Number of Places Lived in the

## 2024-04-25 LAB
ALBUMIN SERPL-MCNC: 3.4 G/DL (ref 3.8–4.8)
ALBUMIN/GLOB SERPL: 1.5 {RATIO} (ref 1.2–2.2)
ALP SERPL-CCNC: 88 IU/L (ref 44–121)
ALT SERPL-CCNC: 18 IU/L (ref 0–32)
AST SERPL-CCNC: 23 IU/L (ref 0–40)
BILIRUB SERPL-MCNC: 0.4 MG/DL (ref 0–1.2)
BUN SERPL-MCNC: 16 MG/DL (ref 8–27)
BUN/CREAT SERPL: 30 (ref 12–28)
CALCIUM SERPL-MCNC: 8.7 MG/DL (ref 8.7–10.3)
CHLORIDE SERPL-SCNC: 98 MMOL/L (ref 96–106)
CO2 SERPL-SCNC: 24 MMOL/L (ref 20–29)
CREAT SERPL-MCNC: 0.53 MG/DL (ref 0.57–1)
EGFRCR SERPLBLD CKD-EPI 2021: 93 ML/MIN/1.73
ERYTHROCYTE [DISTWIDTH] IN BLOOD BY AUTOMATED COUNT: 13.6 % (ref 11.7–15.4)
GLOBULIN SER CALC-MCNC: 2.2 G/DL (ref 1.5–4.5)
GLUCOSE SERPL-MCNC: 93 MG/DL (ref 70–99)
HCT VFR BLD AUTO: 39.9 % (ref 34–46.6)
HGB BLD-MCNC: 13.4 G/DL (ref 11.1–15.9)
MCH RBC QN AUTO: 31 PG (ref 26.6–33)
MCHC RBC AUTO-ENTMCNC: 33.6 G/DL (ref 31.5–35.7)
MCV RBC AUTO: 92 FL (ref 79–97)
PLATELET # BLD AUTO: 186 X10E3/UL (ref 150–450)
POTASSIUM SERPL-SCNC: 4.4 MMOL/L (ref 3.5–5.2)
PROT SERPL-MCNC: 5.6 G/DL (ref 6–8.5)
RBC # BLD AUTO: 4.32 X10E6/UL (ref 3.77–5.28)
SODIUM SERPL-SCNC: 138 MMOL/L (ref 134–144)
WBC # BLD AUTO: 9.7 X10E3/UL (ref 3.4–10.8)

## 2024-04-26 ENCOUNTER — TELEPHONE (OUTPATIENT)
Facility: CLINIC | Age: 79
End: 2024-04-26

## 2024-04-26 NOTE — TELEPHONE ENCOUNTER
Chrissy, this is what I got back from [facility].      Bernard Gallagher  [employer] [facility]  4/24 ? 5:09PM  [employer] [facility] is unable to fulfill the order for the following reason:  Other: I have reviewed the order and unfortunately the patient's insurance does not cover incontinence supplies we are unable to process this order we apologize for any inconvenience Thank you, Bernard. I have reviewed the order and unfortunately the patient's insurance does not cover incontinence supplies we are unable to process this order we apologize for any inconvenience Thank you, Bernard  . Assigned to Beverly Kiser.  Bernard Gallagher  [facility]  4/24 ? 5:09PM  @Beverly Kiser   I have reviewed the order and unfortunately the patient's insurance does not cover incontinence supplies we are unable to process this order we apologize for any inconvenience Thank you, Bernard          Please advise

## 2024-04-26 NOTE — TELEPHONE ENCOUNTER
Patient's daughter advised that incontinence supplies were not covered by insurance. And advised [employer] has accepted the order for the darian lift.

## 2024-06-13 ENCOUNTER — HOSPITAL ENCOUNTER (INPATIENT)
Facility: HOSPITAL | Age: 79
LOS: 3 days | DRG: 853 | End: 2024-06-16
Attending: STUDENT IN AN ORGANIZED HEALTH CARE EDUCATION/TRAINING PROGRAM | Admitting: INTERNAL MEDICINE
Payer: MEDICARE

## 2024-06-13 ENCOUNTER — APPOINTMENT (OUTPATIENT)
Facility: HOSPITAL | Age: 79
DRG: 853 | End: 2024-06-13
Attending: STUDENT IN AN ORGANIZED HEALTH CARE EDUCATION/TRAINING PROGRAM
Payer: MEDICARE

## 2024-06-13 ENCOUNTER — APPOINTMENT (OUTPATIENT)
Facility: HOSPITAL | Age: 79
DRG: 853 | End: 2024-06-13
Payer: MEDICARE

## 2024-06-13 DIAGNOSIS — K92.2 GASTROINTESTINAL HEMORRHAGE, UNSPECIFIED GASTROINTESTINAL HEMORRHAGE TYPE: Primary | ICD-10-CM

## 2024-06-13 DIAGNOSIS — R57.8 HEMORRHAGIC SHOCK (HCC): ICD-10-CM

## 2024-06-13 DIAGNOSIS — I26.99 ACUTE PULMONARY EMBOLISM, UNSPECIFIED PULMONARY EMBOLISM TYPE, UNSPECIFIED WHETHER ACUTE COR PULMONALE PRESENT (HCC): ICD-10-CM

## 2024-06-13 DIAGNOSIS — I95.9 HYPOTENSION, UNSPECIFIED HYPOTENSION TYPE: ICD-10-CM

## 2024-06-13 LAB
ALBUMIN SERPL-MCNC: 2.2 G/DL (ref 3.5–5)
ALBUMIN/GLOB SERPL: 0.8 (ref 1.1–2.2)
ALP SERPL-CCNC: 54 U/L (ref 45–117)
ALT SERPL-CCNC: 16 U/L (ref 12–78)
ANION GAP SERPL CALC-SCNC: 5 MMOL/L (ref 5–15)
APTT PPP: 21.4 SEC (ref 22.1–31)
AST SERPL-CCNC: 9 U/L (ref 15–37)
BASOPHILS # BLD: 0 K/UL (ref 0–0.1)
BASOPHILS NFR BLD: 0 % (ref 0–1)
BILIRUB SERPL-MCNC: 0.5 MG/DL (ref 0.2–1)
BUN SERPL-MCNC: 17 MG/DL (ref 6–20)
BUN/CREAT SERPL: 27 (ref 12–20)
CALCIUM SERPL-MCNC: 8.1 MG/DL (ref 8.5–10.1)
CHLORIDE SERPL-SCNC: 108 MMOL/L (ref 97–108)
CO2 SERPL-SCNC: 25 MMOL/L (ref 21–32)
COMMENT:: NORMAL
CREAT SERPL-MCNC: 0.63 MG/DL (ref 0.55–1.02)
DIFFERENTIAL METHOD BLD: ABNORMAL
EOSINOPHIL # BLD: 0 K/UL (ref 0–0.4)
EOSINOPHIL NFR BLD: 0 % (ref 0–7)
ERYTHROCYTE [DISTWIDTH] IN BLOOD BY AUTOMATED COUNT: 15.9 % (ref 11.5–14.5)
GLOBULIN SER CALC-MCNC: 2.6 G/DL (ref 2–4)
GLUCOSE SERPL-MCNC: 243 MG/DL (ref 65–100)
HCT VFR BLD AUTO: 30.3 % (ref 35–47)
HCT VFR BLD AUTO: 32.8 % (ref 35–47)
HGB BLD-MCNC: 10.3 G/DL (ref 11.5–16)
HGB BLD-MCNC: 9.7 G/DL (ref 11.5–16)
IMM GRANULOCYTES # BLD AUTO: 0.6 K/UL (ref 0–0.04)
IMM GRANULOCYTES NFR BLD AUTO: 5 % (ref 0–0.5)
INR PPP: 1.1 (ref 0.9–1.1)
LACTATE SERPL-SCNC: 3 MMOL/L (ref 0.4–2)
LYMPHOCYTES # BLD: 1.8 K/UL (ref 0.8–3.5)
LYMPHOCYTES NFR BLD: 16 % (ref 12–49)
MCH RBC QN AUTO: 30.8 PG (ref 26–34)
MCHC RBC AUTO-ENTMCNC: 32 G/DL (ref 30–36.5)
MCV RBC AUTO: 96.2 FL (ref 80–99)
MONOCYTES # BLD: 0.6 K/UL (ref 0–1)
MONOCYTES NFR BLD: 5 % (ref 5–13)
NEUTS SEG # BLD: 8.5 K/UL (ref 1.8–8)
NEUTS SEG NFR BLD: 74 % (ref 32–75)
NRBC # BLD: 0 K/UL (ref 0–0.01)
NRBC BLD-RTO: 0 PER 100 WBC
PLATELET # BLD AUTO: 194 K/UL (ref 150–400)
PMV BLD AUTO: 10 FL (ref 8.9–12.9)
POTASSIUM SERPL-SCNC: 4.9 MMOL/L (ref 3.5–5.1)
PROT SERPL-MCNC: 4.8 G/DL (ref 6.4–8.2)
PROTHROMBIN TIME: 11.3 SEC (ref 9–11.1)
RBC # BLD AUTO: 3.15 M/UL (ref 3.8–5.2)
RBC MORPH BLD: ABNORMAL
SODIUM SERPL-SCNC: 138 MMOL/L (ref 136–145)
SPECIMEN HOLD: NORMAL
THERAPEUTIC RANGE: ABNORMAL SECS (ref 58–77)
TROPONIN I SERPL HS-MCNC: 18 NG/L (ref 0–51)
WBC # BLD AUTO: 11.5 K/UL (ref 3.6–11)

## 2024-06-13 PROCEDURE — 6360000002 HC RX W HCPCS: Performed by: STUDENT IN AN ORGANIZED HEALTH CARE EDUCATION/TRAINING PROGRAM

## 2024-06-13 PROCEDURE — 86920 COMPATIBILITY TEST SPIN: CPT

## 2024-06-13 PROCEDURE — C1769 GUIDE WIRE: HCPCS

## 2024-06-13 PROCEDURE — 77002 NEEDLE LOCALIZATION BY XRAY: CPT

## 2024-06-13 PROCEDURE — C1894 INTRO/SHEATH, NON-LASER: HCPCS

## 2024-06-13 PROCEDURE — 85014 HEMATOCRIT: CPT

## 2024-06-13 PROCEDURE — 2709999900 HC NON-CHARGEABLE SUPPLY

## 2024-06-13 PROCEDURE — B41F1ZZ FLUOROSCOPY OF RIGHT LOWER EXTREMITY ARTERIES USING LOW OSMOLAR CONTRAST: ICD-10-PCS | Performed by: STUDENT IN AN ORGANIZED HEALTH CARE EDUCATION/TRAINING PROGRAM

## 2024-06-13 PROCEDURE — B4141ZZ FLUOROSCOPY OF SUPERIOR MESENTERIC ARTERY USING LOW OSMOLAR CONTRAST: ICD-10-PCS | Performed by: STUDENT IN AN ORGANIZED HEALTH CARE EDUCATION/TRAINING PROGRAM

## 2024-06-13 PROCEDURE — 99285 EMERGENCY DEPT VISIT HI MDM: CPT

## 2024-06-13 PROCEDURE — 04H533Z INSERTION OF INFUSION DEVICE INTO SUPERIOR MESENTERIC ARTERY, PERCUTANEOUS APPROACH: ICD-10-PCS | Performed by: STUDENT IN AN ORGANIZED HEALTH CARE EDUCATION/TRAINING PROGRAM

## 2024-06-13 PROCEDURE — 30233N1 TRANSFUSION OF NONAUTOLOGOUS RED BLOOD CELLS INTO PERIPHERAL VEIN, PERCUTANEOUS APPROACH: ICD-10-PCS | Performed by: INTERNAL MEDICINE

## 2024-06-13 PROCEDURE — 83605 ASSAY OF LACTIC ACID: CPT

## 2024-06-13 PROCEDURE — 86850 RBC ANTIBODY SCREEN: CPT

## 2024-06-13 PROCEDURE — 37244 VASC EMBOLIZE/OCCLUDE BLEED: CPT

## 2024-06-13 PROCEDURE — 6360000004 HC RX CONTRAST MEDICATION: Performed by: STUDENT IN AN ORGANIZED HEALTH CARE EDUCATION/TRAINING PROGRAM

## 2024-06-13 PROCEDURE — 86901 BLOOD TYPING SEROLOGIC RH(D): CPT

## 2024-06-13 PROCEDURE — 85018 HEMOGLOBIN: CPT

## 2024-06-13 PROCEDURE — 74174 CTA ABD&PLVS W/CONTRAST: CPT

## 2024-06-13 PROCEDURE — 84484 ASSAY OF TROPONIN QUANT: CPT

## 2024-06-13 PROCEDURE — 80053 COMPREHEN METABOLIC PANEL: CPT

## 2024-06-13 PROCEDURE — C1889 IMPLANT/INSERT DEVICE, NOC: HCPCS

## 2024-06-13 PROCEDURE — 2000000000 HC ICU R&B

## 2024-06-13 PROCEDURE — 85610 PROTHROMBIN TIME: CPT

## 2024-06-13 PROCEDURE — 85025 COMPLETE CBC W/AUTO DIFF WBC: CPT

## 2024-06-13 PROCEDURE — 86900 BLOOD TYPING SEROLOGIC ABO: CPT

## 2024-06-13 PROCEDURE — 94761 N-INVAS EAR/PLS OXIMETRY MLT: CPT

## 2024-06-13 PROCEDURE — 2500000003 HC RX 250 WO HCPCS: Performed by: STUDENT IN AN ORGANIZED HEALTH CARE EDUCATION/TRAINING PROGRAM

## 2024-06-13 PROCEDURE — 85730 THROMBOPLASTIN TIME PARTIAL: CPT

## 2024-06-13 PROCEDURE — C1887 CATHETER, GUIDING: HCPCS

## 2024-06-13 PROCEDURE — 04V53DZ RESTRICTION OF SUPERIOR MESENTERIC ARTERY WITH INTRALUMINAL DEVICE, PERCUTANEOUS APPROACH: ICD-10-PCS | Performed by: STUDENT IN AN ORGANIZED HEALTH CARE EDUCATION/TRAINING PROGRAM

## 2024-06-13 PROCEDURE — 04H033Z INSERTION OF INFUSION DEVICE INTO ABDOMINAL AORTA, PERCUTANEOUS APPROACH: ICD-10-PCS | Performed by: STUDENT IN AN ORGANIZED HEALTH CARE EDUCATION/TRAINING PROGRAM

## 2024-06-13 PROCEDURE — 36415 COLL VENOUS BLD VENIPUNCTURE: CPT

## 2024-06-13 PROCEDURE — P9016 RBC LEUKOCYTES REDUCED: HCPCS

## 2024-06-13 RX ORDER — CLOPIDOGREL BISULFATE 75 MG/1
75 TABLET ORAL DAILY
COMMUNITY

## 2024-06-13 RX ORDER — SODIUM CHLORIDE 0.9 % (FLUSH) 0.9 %
5-40 SYRINGE (ML) INJECTION EVERY 12 HOURS SCHEDULED
Status: DISCONTINUED | OUTPATIENT
Start: 2024-06-13 | End: 2024-06-16 | Stop reason: HOSPADM

## 2024-06-13 RX ORDER — M-VIT,TX,IRON,MINS/CALC/FOLIC 27MG-0.4MG
1 TABLET ORAL DAILY
Status: DISCONTINUED | OUTPATIENT
Start: 2024-06-14 | End: 2024-06-13

## 2024-06-13 RX ORDER — LIDOCAINE HYDROCHLORIDE 10 MG/ML
INJECTION, SOLUTION EPIDURAL; INFILTRATION; INTRACAUDAL; PERINEURAL PRN
Status: COMPLETED | OUTPATIENT
Start: 2024-06-13 | End: 2024-06-13

## 2024-06-13 RX ORDER — MAGNESIUM SULFATE IN WATER 40 MG/ML
2000 INJECTION, SOLUTION INTRAVENOUS PRN
Status: DISCONTINUED | OUTPATIENT
Start: 2024-06-13 | End: 2024-06-14

## 2024-06-13 RX ORDER — SODIUM CHLORIDE 9 MG/ML
INJECTION, SOLUTION INTRAVENOUS PRN
Status: DISCONTINUED | OUTPATIENT
Start: 2024-06-13 | End: 2024-06-16 | Stop reason: HOSPADM

## 2024-06-13 RX ORDER — POLYETHYLENE GLYCOL 3350 17 G/17G
17 POWDER, FOR SOLUTION ORAL DAILY PRN
Status: DISCONTINUED | OUTPATIENT
Start: 2024-06-13 | End: 2024-06-16 | Stop reason: HOSPADM

## 2024-06-13 RX ORDER — POTASSIUM CHLORIDE 7.45 MG/ML
10 INJECTION INTRAVENOUS PRN
Status: DISCONTINUED | OUTPATIENT
Start: 2024-06-13 | End: 2024-06-14

## 2024-06-13 RX ORDER — ONDANSETRON 2 MG/ML
4 INJECTION INTRAMUSCULAR; INTRAVENOUS EVERY 6 HOURS PRN
Status: DISCONTINUED | OUTPATIENT
Start: 2024-06-13 | End: 2024-06-16 | Stop reason: HOSPADM

## 2024-06-13 RX ORDER — LACTOBACILLUS RHAMNOSUS GG 10B CELL
1 CAPSULE ORAL DAILY
Status: DISCONTINUED | OUTPATIENT
Start: 2024-06-14 | End: 2024-06-16

## 2024-06-13 RX ORDER — UREA 10 %
500 LOTION (ML) TOPICAL DAILY
Status: DISCONTINUED | OUTPATIENT
Start: 2024-06-14 | End: 2024-06-16 | Stop reason: HOSPADM

## 2024-06-13 RX ORDER — POTASSIUM CHLORIDE 750 MG/1
40 TABLET, FILM COATED, EXTENDED RELEASE ORAL PRN
Status: DISCONTINUED | OUTPATIENT
Start: 2024-06-13 | End: 2024-06-14

## 2024-06-13 RX ORDER — ACETAMINOPHEN 325 MG/1
650 TABLET ORAL EVERY 6 HOURS PRN
Status: DISCONTINUED | OUTPATIENT
Start: 2024-06-13 | End: 2024-06-16 | Stop reason: HOSPADM

## 2024-06-13 RX ORDER — MULTIVITAMIN WITH IRON
1 TABLET ORAL DAILY
Status: DISCONTINUED | OUTPATIENT
Start: 2024-06-14 | End: 2024-06-16 | Stop reason: HOSPADM

## 2024-06-13 RX ORDER — FENTANYL CITRATE 50 UG/ML
INJECTION, SOLUTION INTRAMUSCULAR; INTRAVENOUS PRN
Status: COMPLETED | OUTPATIENT
Start: 2024-06-13 | End: 2024-06-13

## 2024-06-13 RX ORDER — METHENAMINE HIPPURATE 1000 MG/1
1 TABLET ORAL 2 TIMES DAILY WITH MEALS
COMMUNITY

## 2024-06-13 RX ORDER — SODIUM CHLORIDE 0.9 % (FLUSH) 0.9 %
5-40 SYRINGE (ML) INJECTION PRN
Status: DISCONTINUED | OUTPATIENT
Start: 2024-06-13 | End: 2024-06-16 | Stop reason: HOSPADM

## 2024-06-13 RX ORDER — ACETAMINOPHEN 650 MG/1
650 SUPPOSITORY RECTAL EVERY 6 HOURS PRN
Status: DISCONTINUED | OUTPATIENT
Start: 2024-06-13 | End: 2024-06-14

## 2024-06-13 RX ORDER — GABAPENTIN 300 MG/1
300 CAPSULE ORAL 3 TIMES DAILY
Status: DISCONTINUED | OUTPATIENT
Start: 2024-06-13 | End: 2024-06-14

## 2024-06-13 RX ADMIN — FENTANYL CITRATE 25 MCG: 50 INJECTION, SOLUTION INTRAMUSCULAR; INTRAVENOUS at 22:09

## 2024-06-13 RX ADMIN — IOPAMIDOL 100 ML: 612 INJECTION, SOLUTION INTRAVENOUS at 22:15

## 2024-06-13 RX ADMIN — FENTANYL CITRATE 25 MCG: 50 INJECTION, SOLUTION INTRAMUSCULAR; INTRAVENOUS at 21:30

## 2024-06-13 RX ADMIN — IOPAMIDOL 100 ML: 755 INJECTION, SOLUTION INTRAVENOUS at 19:47

## 2024-06-13 RX ADMIN — LIDOCAINE HYDROCHLORIDE 7 ML: 10 INJECTION, SOLUTION EPIDURAL; INFILTRATION; INTRACAUDAL; PERINEURAL at 21:20

## 2024-06-13 ASSESSMENT — PAIN DESCRIPTION - LOCATION: LOCATION: HEAD

## 2024-06-13 ASSESSMENT — PAIN DESCRIPTION - DESCRIPTORS: DESCRIPTORS: ACHING

## 2024-06-13 ASSESSMENT — PAIN SCALES - GENERAL: PAINLEVEL_OUTOF10: 5

## 2024-06-13 NOTE — ED TRIAGE NOTES
Pt arrives to the ED with EMS. EMS states pt daughter is her caretaker. Had a BM at home let lead to bleeding from rectum and passing multiple clots. Unknown if on blood thinners.     Bedridden at baseline.

## 2024-06-13 NOTE — CONSENT
Informed Consent for Blood Component Transfusion Note    I have discussed with the patient the rationale for blood component transfusion; its benefits in treating or preventing fatigue, organ damage, or death; and its risk which includes mild transfusion reactions, rare risk of blood borne infection, or more serious but rare reactions. I have discussed the alternatives to transfusion, including the risk and consequences of not receiving transfusion. The patient had an opportunity to ask questions and had agreed to proceed with transfusion of blood components.    Electronically signed by Shailesh Rosas DO on 6/13/24 at 7:03 PM EDT

## 2024-06-13 NOTE — ED NOTES
Pt unable to complete consent for blood transfusion d/t altered mental status. Emergency blood transfusion initiated by MD Ralph.

## 2024-06-14 ENCOUNTER — APPOINTMENT (OUTPATIENT)
Facility: HOSPITAL | Age: 79
DRG: 853 | End: 2024-06-14
Payer: MEDICARE

## 2024-06-14 ENCOUNTER — APPOINTMENT (OUTPATIENT)
Dept: VASCULAR SURGERY | Facility: HOSPITAL | Age: 79
DRG: 853 | End: 2024-06-14
Attending: INTERNAL MEDICINE
Payer: MEDICARE

## 2024-06-14 PROBLEM — I95.9 HYPOTENSION: Status: ACTIVE | Noted: 2024-06-14

## 2024-06-14 PROBLEM — E88.09 HYPOALBUMINEMIA: Status: ACTIVE | Noted: 2024-06-14

## 2024-06-14 PROBLEM — D72.829 LEUKOCYTOSIS: Status: ACTIVE | Noted: 2024-06-14

## 2024-06-14 PROBLEM — D69.6 THROMBOCYTOPENIA (HCC): Status: ACTIVE | Noted: 2024-06-14

## 2024-06-14 PROBLEM — R57.9 SHOCK (HCC): Status: RESOLVED | Noted: 2024-02-24 | Resolved: 2024-06-14

## 2024-06-14 PROBLEM — E87.5 HYPERKALEMIA: Status: ACTIVE | Noted: 2024-06-14

## 2024-06-14 LAB
ABO + RH BLD: NORMAL
ALBUMIN SERPL-MCNC: 2 G/DL (ref 3.5–5)
ALBUMIN/GLOB SERPL: 0.8 (ref 1.1–2.2)
ALP SERPL-CCNC: 53 U/L (ref 45–117)
ALT SERPL-CCNC: 15 U/L (ref 12–78)
ANION GAP BLD CALC-SCNC: 10 (ref 10–20)
ANION GAP SERPL CALC-SCNC: 3 MMOL/L (ref 5–15)
ANION GAP SERPL CALC-SCNC: 4 MMOL/L (ref 5–15)
APPEARANCE UR: ABNORMAL
AST SERPL-CCNC: 17 U/L (ref 15–37)
BACTERIA URNS QL MICRO: ABNORMAL /HPF
BASE DEFICIT BLD-SCNC: 12.3 MMOL/L
BASE DEFICIT BLDV-SCNC: 9.2 MMOL/L
BASOPHILS # BLD: 0 K/UL (ref 0–0.1)
BASOPHILS NFR BLD: 0 % (ref 0–1)
BILIRUB SERPL-MCNC: 0.4 MG/DL (ref 0.2–1)
BILIRUB UR QL: NEGATIVE
BLD PROD TYP BPU: NORMAL
BLD PROD TYP BPU: NORMAL
BLOOD BANK BLOOD PRODUCT EXPIRATION DATE: NORMAL
BLOOD BANK BLOOD PRODUCT EXPIRATION DATE: NORMAL
BLOOD BANK DISPENSE STATUS: NORMAL
BLOOD BANK DISPENSE STATUS: NORMAL
BLOOD BANK ISBT PRODUCT BLOOD TYPE: 5100
BLOOD BANK ISBT PRODUCT BLOOD TYPE: 5100
BLOOD BANK UNIT TYPE AND RH: NORMAL
BLOOD BANK UNIT TYPE AND RH: NORMAL
BLOOD GROUP ANTIBODIES SERPL: NORMAL
BPU ID: NORMAL
BPU ID: NORMAL
BUN SERPL-MCNC: 18 MG/DL (ref 6–20)
BUN SERPL-MCNC: 21 MG/DL (ref 6–20)
BUN/CREAT SERPL: 33 (ref 12–20)
BUN/CREAT SERPL: 35 (ref 12–20)
CA-I BLD-MCNC: 1.1 MMOL/L (ref 1.12–1.32)
CALCIUM SERPL-MCNC: 7.3 MG/DL (ref 8.5–10.1)
CALCIUM SERPL-MCNC: 7.5 MG/DL (ref 8.5–10.1)
CHLORIDE BLD-SCNC: 117 MMOL/L (ref 100–108)
CHLORIDE SERPL-SCNC: 110 MMOL/L (ref 97–108)
CHLORIDE SERPL-SCNC: 116 MMOL/L (ref 97–108)
CO2 BLD-SCNC: 16 MMOL/L (ref 19–24)
CO2 SERPL-SCNC: 23 MMOL/L (ref 21–32)
CO2 SERPL-SCNC: 25 MMOL/L (ref 21–32)
COLOR UR: ABNORMAL
CREAT SERPL-MCNC: 0.55 MG/DL (ref 0.55–1.02)
CREAT SERPL-MCNC: 0.6 MG/DL (ref 0.55–1.02)
CREAT UR-MCNC: 0.5 MG/DL (ref 0.6–1.3)
CROSSMATCH RESULT: NORMAL
CROSSMATCH RESULT: NORMAL
DIFFERENTIAL METHOD BLD: ABNORMAL
EOSINOPHIL # BLD: 0.1 K/UL (ref 0–0.4)
EOSINOPHIL NFR BLD: 1 % (ref 0–7)
EPITH CASTS URNS QL MICRO: ABNORMAL /LPF
ERYTHROCYTE [DISTWIDTH] IN BLOOD BY AUTOMATED COUNT: 21.7 % (ref 11.5–14.5)
ERYTHROCYTE [DISTWIDTH] IN BLOOD BY AUTOMATED COUNT: 21.8 % (ref 11.5–14.5)
ERYTHROCYTE [DISTWIDTH] IN BLOOD BY AUTOMATED COUNT: 22.1 % (ref 11.5–14.5)
ERYTHROCYTE [DISTWIDTH] IN BLOOD BY AUTOMATED COUNT: 22.3 % (ref 11.5–14.5)
EST. AVERAGE GLUCOSE BLD GHB EST-MCNC: 108 MG/DL
FERRITIN SERPL-MCNC: 232 NG/ML (ref 26–388)
FOLATE SERPL-MCNC: 16 NG/ML (ref 5–21)
GLOBULIN SER CALC-MCNC: 2.6 G/DL (ref 2–4)
GLUCOSE BLD STRIP.AUTO-MCNC: 133 MG/DL (ref 65–117)
GLUCOSE BLD STRIP.AUTO-MCNC: 140 MG/DL (ref 65–117)
GLUCOSE BLD STRIP.AUTO-MCNC: 185 MG/DL (ref 74–99)
GLUCOSE SERPL-MCNC: 141 MG/DL (ref 65–100)
GLUCOSE SERPL-MCNC: 171 MG/DL (ref 65–100)
GLUCOSE UR STRIP.AUTO-MCNC: NEGATIVE MG/DL
HAPTOGLOB SERPL-MCNC: 202 MG/DL (ref 30–200)
HBA1C MFR BLD: 5.4 % (ref 4–5.6)
HCO3 BLDA-SCNC: 16 MMOL/L
HCO3 BLDV-SCNC: 17.7 MMOL/L (ref 23–28)
HCT VFR BLD AUTO: 27.5 % (ref 35–47)
HCT VFR BLD AUTO: 29.8 % (ref 35–47)
HCT VFR BLD AUTO: 34.2 % (ref 35–47)
HCT VFR BLD AUTO: 35.6 % (ref 35–47)
HGB BLD-MCNC: 10.9 G/DL (ref 11.5–16)
HGB BLD-MCNC: 10.9 G/DL (ref 11.5–16)
HGB BLD-MCNC: 8.9 G/DL (ref 11.5–16)
HGB BLD-MCNC: 9.7 G/DL (ref 11.5–16)
HGB UR QL STRIP: ABNORMAL
IMM GRANULOCYTES # BLD AUTO: 0 K/UL
IMM GRANULOCYTES NFR BLD AUTO: 0 %
INR PPP: 1.2 (ref 0.9–1.1)
IRON SATN MFR SERPL: 46 % (ref 20–50)
IRON SERPL-MCNC: 91 UG/DL (ref 35–150)
KETONES UR QL STRIP.AUTO: NEGATIVE MG/DL
LACTATE BLD-SCNC: 0.97 MMOL/L (ref 0.4–2)
LACTATE SERPL-SCNC: 1.2 MMOL/L (ref 0.4–2)
LACTATE SERPL-SCNC: 2 MMOL/L (ref 0.4–2)
LDH SERPL L TO P-CCNC: 396 U/L (ref 81–246)
LEUKOCYTE ESTERASE UR QL STRIP.AUTO: ABNORMAL
LYMPHOCYTES # BLD: 3.2 K/UL (ref 0.8–3.5)
LYMPHOCYTES NFR BLD: 25 % (ref 12–49)
MAGNESIUM SERPL-MCNC: 1.4 MG/DL (ref 1.6–2.4)
MCH RBC QN AUTO: 28 PG (ref 26–34)
MCH RBC QN AUTO: 28.3 PG (ref 26–34)
MCH RBC QN AUTO: 28.6 PG (ref 26–34)
MCH RBC QN AUTO: 28.8 PG (ref 26–34)
MCHC RBC AUTO-ENTMCNC: 30.6 G/DL (ref 30–36.5)
MCHC RBC AUTO-ENTMCNC: 31.9 G/DL (ref 30–36.5)
MCHC RBC AUTO-ENTMCNC: 32.4 G/DL (ref 30–36.5)
MCHC RBC AUTO-ENTMCNC: 32.6 G/DL (ref 30–36.5)
MCV RBC AUTO: 88.4 FL (ref 80–99)
MCV RBC AUTO: 88.4 FL (ref 80–99)
MCV RBC AUTO: 88.8 FL (ref 80–99)
MCV RBC AUTO: 91.5 FL (ref 80–99)
METAMYELOCYTES NFR BLD MANUAL: 2 %
MONOCYTES # BLD: 0.3 K/UL (ref 0–1)
MONOCYTES NFR BLD: 2 % (ref 5–13)
NEUTS SEG # BLD: 9 K/UL (ref 1.8–8)
NEUTS SEG NFR BLD: 70 % (ref 32–75)
NITRITE UR QL STRIP.AUTO: POSITIVE
NRBC # BLD: 0 K/UL (ref 0–0.01)
NRBC # BLD: 0 K/UL (ref 0–0.01)
NRBC # BLD: 0.02 K/UL (ref 0–0.01)
NRBC # BLD: 0.02 K/UL (ref 0–0.01)
NRBC BLD-RTO: 0 PER 100 WBC
NRBC BLD-RTO: 0 PER 100 WBC
NRBC BLD-RTO: 0.1 PER 100 WBC
NRBC BLD-RTO: 0.1 PER 100 WBC
PCO2 BLDV: 42 MMHG (ref 41–51)
PCO2 BLDV: 46.4 MMHG (ref 41–51)
PH BLDV: 7.15 (ref 7.32–7.42)
PH BLDV: 7.23 (ref 7.32–7.42)
PH UR STRIP: 6 (ref 5–8)
PHOSPHATE SERPL-MCNC: 3.8 MG/DL (ref 2.6–4.7)
PLATELET # BLD AUTO: 124 K/UL (ref 150–400)
PLATELET # BLD AUTO: 132 K/UL (ref 150–400)
PLATELET # BLD AUTO: 140 K/UL (ref 150–400)
PLATELET # BLD AUTO: 158 K/UL (ref 150–400)
PMV BLD AUTO: 10 FL (ref 8.9–12.9)
PMV BLD AUTO: 10.2 FL (ref 8.9–12.9)
PMV BLD AUTO: 9.5 FL (ref 8.9–12.9)
PMV BLD AUTO: 9.9 FL (ref 8.9–12.9)
PO2 BLDV: 36 MMHG (ref 25–40)
PO2 BLDV: 47 MMHG (ref 25–40)
POTASSIUM BLD-SCNC: 4.5 MMOL/L (ref 3.5–5.5)
POTASSIUM SERPL-SCNC: 5.3 MMOL/L (ref 3.5–5.1)
POTASSIUM SERPL-SCNC: 5.6 MMOL/L (ref 3.5–5.1)
PROCALCITONIN SERPL-MCNC: 0.09 NG/ML
PROT SERPL-MCNC: 4.6 G/DL (ref 6.4–8.2)
PROT UR STRIP-MCNC: 30 MG/DL
PROTHROMBIN TIME: 11.9 SEC (ref 9–11.1)
RBC # BLD AUTO: 3.11 M/UL (ref 3.8–5.2)
RBC # BLD AUTO: 3.37 M/UL (ref 3.8–5.2)
RBC # BLD AUTO: 3.85 M/UL (ref 3.8–5.2)
RBC # BLD AUTO: 3.89 M/UL (ref 3.8–5.2)
RBC #/AREA URNS HPF: ABNORMAL /HPF (ref 0–5)
RBC MORPH BLD: ABNORMAL
SAO2 % BLD: 70 %
SAO2 % BLDV: 57.8 % (ref 65–88)
SERVICE CMNT-IMP: ABNORMAL
SODIUM BLD-SCNC: 143 MMOL/L (ref 136–145)
SODIUM SERPL-SCNC: 139 MMOL/L (ref 136–145)
SODIUM SERPL-SCNC: 142 MMOL/L (ref 136–145)
SP GR UR REFRACTOMETRY: <1.005 (ref 1–1.03)
SPECIMEN EXP DATE BLD: NORMAL
SPECIMEN SITE: ABNORMAL
SPECIMEN TYPE: ABNORMAL
TIBC SERPL-MCNC: 200 UG/DL (ref 250–450)
TROPONIN I SERPL HS-MCNC: 23 NG/L (ref 0–51)
UNIT DIVISION: 0
UNIT DIVISION: 0
UNIT ISSUE DATE/TIME: NORMAL
UNIT ISSUE DATE/TIME: NORMAL
URINE CULTURE IF INDICATED: ABNORMAL
UROBILINOGEN UR QL STRIP.AUTO: 0.2 EU/DL (ref 0.2–1)
VIT B12 SERPL-MCNC: 259 PG/ML (ref 193–986)
WBC # BLD AUTO: 12.9 K/UL (ref 3.6–11)
WBC # BLD AUTO: 19.5 K/UL (ref 3.6–11)
WBC # BLD AUTO: 20.4 K/UL (ref 3.6–11)
WBC # BLD AUTO: 21.2 K/UL (ref 3.6–11)
WBC URNS QL MICRO: >100 /HPF (ref 0–4)

## 2024-06-14 PROCEDURE — 87088 URINE BACTERIA CULTURE: CPT

## 2024-06-14 PROCEDURE — 84145 PROCALCITONIN (PCT): CPT

## 2024-06-14 PROCEDURE — 6360000004 HC RX CONTRAST MEDICATION: Performed by: STUDENT IN AN ORGANIZED HEALTH CARE EDUCATION/TRAINING PROGRAM

## 2024-06-14 PROCEDURE — 84295 ASSAY OF SERUM SODIUM: CPT

## 2024-06-14 PROCEDURE — 6360000002 HC RX W HCPCS: Performed by: INTERNAL MEDICINE

## 2024-06-14 PROCEDURE — 6370000000 HC RX 637 (ALT 250 FOR IP): Performed by: INTERNAL MEDICINE

## 2024-06-14 PROCEDURE — 85610 PROTHROMBIN TIME: CPT

## 2024-06-14 PROCEDURE — 74018 RADEX ABDOMEN 1 VIEW: CPT

## 2024-06-14 PROCEDURE — 2500000003 HC RX 250 WO HCPCS: Performed by: INTERNAL MEDICINE

## 2024-06-14 PROCEDURE — 0BH17EZ INSERTION OF ENDOTRACHEAL AIRWAY INTO TRACHEA, VIA NATURAL OR ARTIFICIAL OPENING: ICD-10-PCS | Performed by: INTERNAL MEDICINE

## 2024-06-14 PROCEDURE — 83540 ASSAY OF IRON: CPT

## 2024-06-14 PROCEDURE — 84132 ASSAY OF SERUM POTASSIUM: CPT

## 2024-06-14 PROCEDURE — 81001 URINALYSIS AUTO W/SCOPE: CPT

## 2024-06-14 PROCEDURE — 80053 COMPREHEN METABOLIC PANEL: CPT

## 2024-06-14 PROCEDURE — 85025 COMPLETE CBC W/AUTO DIFF WBC: CPT

## 2024-06-14 PROCEDURE — 87186 SC STD MICRODIL/AGAR DIL: CPT

## 2024-06-14 PROCEDURE — 84484 ASSAY OF TROPONIN QUANT: CPT

## 2024-06-14 PROCEDURE — 93970 EXTREMITY STUDY: CPT

## 2024-06-14 PROCEDURE — 51702 INSERT TEMP BLADDER CATH: CPT

## 2024-06-14 PROCEDURE — 82947 ASSAY GLUCOSE BLOOD QUANT: CPT

## 2024-06-14 PROCEDURE — 94761 N-INVAS EAR/PLS OXIMETRY MLT: CPT

## 2024-06-14 PROCEDURE — 2700000000 HC OXYGEN THERAPY PER DAY

## 2024-06-14 PROCEDURE — 82962 GLUCOSE BLOOD TEST: CPT

## 2024-06-14 PROCEDURE — 36556 INSERT NON-TUNNEL CV CATH: CPT

## 2024-06-14 PROCEDURE — 82607 VITAMIN B-12: CPT

## 2024-06-14 PROCEDURE — 94002 VENT MGMT INPAT INIT DAY: CPT

## 2024-06-14 PROCEDURE — 74174 CTA ABD&PLVS W/CONTRAST: CPT

## 2024-06-14 PROCEDURE — 3430000000 HC RX DIAGNOSTIC RADIOPHARMACEUTICAL: Performed by: INTERNAL MEDICINE

## 2024-06-14 PROCEDURE — 85027 COMPLETE CBC AUTOMATED: CPT

## 2024-06-14 PROCEDURE — 82746 ASSAY OF FOLIC ACID SERUM: CPT

## 2024-06-14 PROCEDURE — 82330 ASSAY OF CALCIUM: CPT

## 2024-06-14 PROCEDURE — 84100 ASSAY OF PHOSPHORUS: CPT

## 2024-06-14 PROCEDURE — 83605 ASSAY OF LACTIC ACID: CPT

## 2024-06-14 PROCEDURE — 87086 URINE CULTURE/COLONY COUNT: CPT

## 2024-06-14 PROCEDURE — 31500 INSERT EMERGENCY AIRWAY: CPT

## 2024-06-14 PROCEDURE — 83615 LACTATE (LD) (LDH) ENZYME: CPT

## 2024-06-14 PROCEDURE — 3E043XZ INTRODUCTION OF VASOPRESSOR INTO CENTRAL VEIN, PERCUTANEOUS APPROACH: ICD-10-PCS | Performed by: INTERNAL MEDICINE

## 2024-06-14 PROCEDURE — 2500000003 HC RX 250 WO HCPCS

## 2024-06-14 PROCEDURE — 6360000004 HC RX CONTRAST MEDICATION: Performed by: INTERNAL MEDICINE

## 2024-06-14 PROCEDURE — 82803 BLOOD GASES ANY COMBINATION: CPT

## 2024-06-14 PROCEDURE — 71275 CT ANGIOGRAPHY CHEST: CPT

## 2024-06-14 PROCEDURE — 83735 ASSAY OF MAGNESIUM: CPT

## 2024-06-14 PROCEDURE — 2580000003 HC RX 258

## 2024-06-14 PROCEDURE — 71045 X-RAY EXAM CHEST 1 VIEW: CPT

## 2024-06-14 PROCEDURE — 83550 IRON BINDING TEST: CPT

## 2024-06-14 PROCEDURE — 2580000003 HC RX 258: Performed by: INTERNAL MEDICINE

## 2024-06-14 PROCEDURE — 36415 COLL VENOUS BLD VENIPUNCTURE: CPT

## 2024-06-14 PROCEDURE — A9560 TC99M LABELED RBC: HCPCS | Performed by: INTERNAL MEDICINE

## 2024-06-14 PROCEDURE — 70450 CT HEAD/BRAIN W/O DYE: CPT

## 2024-06-14 PROCEDURE — 6360000002 HC RX W HCPCS

## 2024-06-14 PROCEDURE — 05HM33Z INSERTION OF INFUSION DEVICE INTO RIGHT INTERNAL JUGULAR VEIN, PERCUTANEOUS APPROACH: ICD-10-PCS | Performed by: INTERNAL MEDICINE

## 2024-06-14 PROCEDURE — 78278 ACUTE GI BLOOD LOSS IMAGING: CPT

## 2024-06-14 PROCEDURE — 04L53DZ OCCLUSION OF SUPERIOR MESENTERIC ARTERY WITH INTRALUMINAL DEVICE, PERCUTANEOUS APPROACH: ICD-10-PCS | Performed by: STUDENT IN AN ORGANIZED HEALTH CARE EDUCATION/TRAINING PROGRAM

## 2024-06-14 PROCEDURE — 83010 ASSAY OF HAPTOGLOBIN QUANT: CPT

## 2024-06-14 PROCEDURE — 83036 HEMOGLOBIN GLYCOSYLATED A1C: CPT

## 2024-06-14 PROCEDURE — C9113 INJ PANTOPRAZOLE SODIUM, VIA: HCPCS | Performed by: INTERNAL MEDICINE

## 2024-06-14 PROCEDURE — 82728 ASSAY OF FERRITIN: CPT

## 2024-06-14 PROCEDURE — 5A1945Z RESPIRATORY VENTILATION, 24-96 CONSECUTIVE HOURS: ICD-10-PCS | Performed by: INTERNAL MEDICINE

## 2024-06-14 PROCEDURE — 2000000000 HC ICU R&B

## 2024-06-14 RX ORDER — METHENAMINE HIPPURATE 1000 MG/1
1 TABLET ORAL 2 TIMES DAILY WITH MEALS
Status: DISCONTINUED | OUTPATIENT
Start: 2024-06-14 | End: 2024-06-16 | Stop reason: HOSPADM

## 2024-06-14 RX ORDER — INSULIN LISPRO 100 [IU]/ML
0-8 INJECTION, SOLUTION INTRAVENOUS; SUBCUTANEOUS
Status: DISCONTINUED | OUTPATIENT
Start: 2024-06-14 | End: 2024-06-14

## 2024-06-14 RX ORDER — NOREPINEPHRINE BITARTRATE 0.06 MG/ML
INJECTION, SOLUTION INTRAVENOUS
Status: COMPLETED
Start: 2024-06-14 | End: 2024-06-14

## 2024-06-14 RX ORDER — HEPARIN 100 UNIT/ML
100 SYRINGE INTRAVENOUS ONCE
Status: DISCONTINUED | OUTPATIENT
Start: 2024-06-14 | End: 2024-06-16

## 2024-06-14 RX ORDER — MORPHINE SULFATE 2 MG/ML
2 INJECTION, SOLUTION INTRAMUSCULAR; INTRAVENOUS EVERY 4 HOURS PRN
Status: DISCONTINUED | OUTPATIENT
Start: 2024-06-14 | End: 2024-06-14

## 2024-06-14 RX ORDER — FENTANYL CITRATE-0.9 % NACL/PF 10 MCG/ML
25-200 PLASTIC BAG, INJECTION (ML) INTRAVENOUS CONTINUOUS
Status: DISCONTINUED | OUTPATIENT
Start: 2024-06-14 | End: 2024-06-16 | Stop reason: HOSPADM

## 2024-06-14 RX ORDER — LIDOCAINE 4 G/G
1 PATCH TOPICAL DAILY
Status: DISCONTINUED | OUTPATIENT
Start: 2024-06-14 | End: 2024-06-16

## 2024-06-14 RX ORDER — ETOMIDATE 2 MG/ML
30 INJECTION INTRAVENOUS ONCE
Status: COMPLETED | OUTPATIENT
Start: 2024-06-14 | End: 2024-06-14

## 2024-06-14 RX ORDER — PROPOFOL 10 MG/ML
INJECTION, EMULSION INTRAVENOUS
Status: COMPLETED
Start: 2024-06-14 | End: 2024-06-14

## 2024-06-14 RX ORDER — DEXTROSE MONOHYDRATE 100 MG/ML
INJECTION, SOLUTION INTRAVENOUS CONTINUOUS PRN
Status: DISCONTINUED | OUTPATIENT
Start: 2024-06-14 | End: 2024-06-14 | Stop reason: SDUPTHER

## 2024-06-14 RX ORDER — METRONIDAZOLE 500 MG/100ML
500 INJECTION, SOLUTION INTRAVENOUS EVERY 8 HOURS
Status: DISCONTINUED | OUTPATIENT
Start: 2024-06-14 | End: 2024-06-16

## 2024-06-14 RX ORDER — CIPROFLOXACIN 2 MG/ML
400 INJECTION, SOLUTION INTRAVENOUS EVERY 12 HOURS
Status: DISCONTINUED | OUTPATIENT
Start: 2024-06-14 | End: 2024-06-15

## 2024-06-14 RX ORDER — ATORVASTATIN CALCIUM 20 MG/1
80 TABLET, FILM COATED ORAL NIGHTLY
Status: DISCONTINUED | OUTPATIENT
Start: 2024-06-14 | End: 2024-06-16 | Stop reason: HOSPADM

## 2024-06-14 RX ORDER — SODIUM CHLORIDE 9 MG/ML
INJECTION, SOLUTION INTRAVENOUS CONTINUOUS
Status: DISCONTINUED | OUTPATIENT
Start: 2024-06-14 | End: 2024-06-14 | Stop reason: SDUPTHER

## 2024-06-14 RX ORDER — INSULIN LISPRO 100 [IU]/ML
0-8 INJECTION, SOLUTION INTRAVENOUS; SUBCUTANEOUS
Status: DISCONTINUED | OUTPATIENT
Start: 2024-06-14 | End: 2024-06-14 | Stop reason: SDUPTHER

## 2024-06-14 RX ORDER — QUETIAPINE FUMARATE 25 MG/1
25 TABLET, FILM COATED ORAL NIGHTLY PRN
Status: DISCONTINUED | OUTPATIENT
Start: 2024-06-14 | End: 2024-06-16 | Stop reason: HOSPADM

## 2024-06-14 RX ORDER — NOREPINEPHRINE BITARTRATE 0.06 MG/ML
1-100 INJECTION, SOLUTION INTRAVENOUS CONTINUOUS
Status: DISPENSED | OUTPATIENT
Start: 2024-06-14 | End: 2024-06-16

## 2024-06-14 RX ORDER — INSULIN LISPRO 100 [IU]/ML
0-4 INJECTION, SOLUTION INTRAVENOUS; SUBCUTANEOUS NIGHTLY
Status: DISCONTINUED | OUTPATIENT
Start: 2024-06-14 | End: 2024-06-14

## 2024-06-14 RX ORDER — HEPARIN 100 UNIT/ML
30 SYRINGE INTRAVENOUS ONCE
Status: DISCONTINUED | OUTPATIENT
Start: 2024-06-14 | End: 2024-06-14

## 2024-06-14 RX ORDER — MIDAZOLAM HYDROCHLORIDE 2 MG/2ML
2 INJECTION, SOLUTION INTRAMUSCULAR; INTRAVENOUS PRN
Status: DISCONTINUED | OUTPATIENT
Start: 2024-06-14 | End: 2024-06-16

## 2024-06-14 RX ORDER — SUCCINYLCHOLINE CHLORIDE 20 MG/ML
80 INJECTION INTRAMUSCULAR; INTRAVENOUS ONCE
Status: COMPLETED | OUTPATIENT
Start: 2024-06-14 | End: 2024-06-14

## 2024-06-14 RX ORDER — INSULIN LISPRO 100 [IU]/ML
0-4 INJECTION, SOLUTION INTRAVENOUS; SUBCUTANEOUS NIGHTLY
Status: DISCONTINUED | OUTPATIENT
Start: 2024-06-14 | End: 2024-06-14 | Stop reason: SDUPTHER

## 2024-06-14 RX ORDER — SODIUM CHLORIDE, SODIUM LACTATE, POTASSIUM CHLORIDE, CALCIUM CHLORIDE 600; 310; 30; 20 MG/100ML; MG/100ML; MG/100ML; MG/100ML
INJECTION, SOLUTION INTRAVENOUS CONTINUOUS
Status: DISCONTINUED | OUTPATIENT
Start: 2024-06-14 | End: 2024-06-15

## 2024-06-14 RX ORDER — OXYCODONE HYDROCHLORIDE 5 MG/1
5 TABLET ORAL EVERY 4 HOURS PRN
Status: DISCONTINUED | OUTPATIENT
Start: 2024-06-14 | End: 2024-06-16 | Stop reason: HOSPADM

## 2024-06-14 RX ORDER — DEXTROSE MONOHYDRATE 100 MG/ML
INJECTION, SOLUTION INTRAVENOUS CONTINUOUS PRN
Status: DISCONTINUED | OUTPATIENT
Start: 2024-06-14 | End: 2024-06-14

## 2024-06-14 RX ORDER — GABAPENTIN 100 MG/1
100 CAPSULE ORAL 3 TIMES DAILY
Status: DISCONTINUED | OUTPATIENT
Start: 2024-06-14 | End: 2024-06-16

## 2024-06-14 RX ORDER — PROPOFOL 10 MG/ML
5-50 INJECTION, EMULSION INTRAVENOUS CONTINUOUS
Status: DISCONTINUED | OUTPATIENT
Start: 2024-06-14 | End: 2024-06-16 | Stop reason: HOSPADM

## 2024-06-14 RX ADMIN — SODIUM BICARBONATE 50 MEQ: 84 INJECTION, SOLUTION INTRAVENOUS at 17:54

## 2024-06-14 RX ADMIN — SODIUM CHLORIDE: 9 INJECTION, SOLUTION INTRAVENOUS at 13:24

## 2024-06-14 RX ADMIN — PROPOFOL 5 MCG/KG/MIN: 10 INJECTION, EMULSION INTRAVENOUS at 17:04

## 2024-06-14 RX ADMIN — GABAPENTIN 300 MG: 300 CAPSULE ORAL at 03:11

## 2024-06-14 RX ADMIN — SODIUM CHLORIDE, POTASSIUM CHLORIDE, SODIUM LACTATE AND CALCIUM CHLORIDE: 600; 310; 30; 20 INJECTION, SOLUTION INTRAVENOUS at 20:04

## 2024-06-14 RX ADMIN — IOPAMIDOL 100 ML: 755 INJECTION, SOLUTION INTRAVENOUS at 14:18

## 2024-06-14 RX ADMIN — VANCOMYCIN HYDROCHLORIDE 2000 MG: 10 INJECTION, POWDER, LYOPHILIZED, FOR SOLUTION INTRAVENOUS at 20:32

## 2024-06-14 RX ADMIN — VASOPRESSIN 0.04 UNITS/MIN: 20 INJECTION INTRAVENOUS at 18:08

## 2024-06-14 RX ADMIN — MIDAZOLAM 2 MG: 1 INJECTION INTRAMUSCULAR; INTRAVENOUS at 17:20

## 2024-06-14 RX ADMIN — ONDANSETRON 4 MG: 2 INJECTION INTRAMUSCULAR; INTRAVENOUS at 01:50

## 2024-06-14 RX ADMIN — TECHNETIUM TC 99M-LABELED RED BLOOD CELLS 25 MILLICURIE: KIT at 22:59

## 2024-06-14 RX ADMIN — ETOMIDATE 30 MG: 2 INJECTION, SOLUTION INTRAVENOUS at 16:08

## 2024-06-14 RX ADMIN — IOPAMIDOL 50 ML: 755 INJECTION, SOLUTION INTRAVENOUS at 14:32

## 2024-06-14 RX ADMIN — PANTOPRAZOLE SODIUM 40 MG: 40 INJECTION, POWDER, FOR SOLUTION INTRAVENOUS at 08:50

## 2024-06-14 RX ADMIN — OXYCODONE 5 MG: 5 TABLET ORAL at 01:50

## 2024-06-14 RX ADMIN — SODIUM CHLORIDE 15 MCG/MIN: 9 INJECTION, SOLUTION INTRAVENOUS at 13:46

## 2024-06-14 RX ADMIN — WATER 2000 MG: 1 INJECTION INTRAMUSCULAR; INTRAVENOUS; SUBCUTANEOUS at 21:04

## 2024-06-14 RX ADMIN — QUETIAPINE FUMARATE 25 MG: 25 TABLET ORAL at 03:45

## 2024-06-14 RX ADMIN — PANTOPRAZOLE SODIUM 40 MG: 40 INJECTION, POWDER, FOR SOLUTION INTRAVENOUS at 21:04

## 2024-06-14 RX ADMIN — SODIUM CHLORIDE: 9 INJECTION, SOLUTION INTRAVENOUS at 03:46

## 2024-06-14 RX ADMIN — ACETAMINOPHEN 650 MG: 325 TABLET ORAL at 01:50

## 2024-06-14 RX ADMIN — HYDROMORPHONE HYDROCHLORIDE 0.25 MG: 1 INJECTION, SOLUTION INTRAMUSCULAR; INTRAVENOUS; SUBCUTANEOUS at 03:45

## 2024-06-14 RX ADMIN — SODIUM CHLORIDE, PRESERVATIVE FREE 10 ML: 5 INJECTION INTRAVENOUS at 08:51

## 2024-06-14 RX ADMIN — Medication 50 MCG/HR: at 16:17

## 2024-06-14 RX ADMIN — SUCCINYLCHOLINE CHLORIDE 80 MG: 20 INJECTION, SOLUTION INTRAMUSCULAR; INTRAVENOUS at 16:09

## 2024-06-14 ASSESSMENT — PULMONARY FUNCTION TESTS
PIF_VALUE: 20
PIF_VALUE: 19

## 2024-06-14 ASSESSMENT — PAIN SCALES - GENERAL
PAINLEVEL_OUTOF10: 0

## 2024-06-14 NOTE — ED NOTES
TRANSFER - OUT REPORT:    Verbal report given to RYAN Mead on Sujata Ernandez  being transferred to [facility] for ordered procedure       Report consisted of patient's Situation, Background, Assessment and   Recommendations(SBAR).     Information from the following report(s) Nurse Handoff Report, ED SBAR, MAR, and Recent Results was reviewed with the receiving nurse.    [place] Fall Assessment:    Presents to emergency department  because of falls (Syncope, seizure, or loss of consciousness): No  Age > 70: Yes  Altered Mental Status, Intoxication with alcohol or substance confusion (Disorientation, impaired judgment, poor safety awaremess, or inability to follow instructions): Yes  Impaired Mobility: Ambulates or transfers with assistive devices or assistance; Unable to ambulate or transer.: Yes  Nursing Judgement: Yes          Lines:   Peripheral IV 06/13/24 Left;Anterior Cephalic (Active)       Peripheral IV 06/13/24 Proximal;Right;Anterior Forearm (Active)   Site Assessment Clean, dry & intact 06/13/24 1928   Line Status Blood return noted;Brisk blood return 06/13/24 1928   Phlebitis Assessment No symptoms 06/13/24 1928   Infiltration Assessment 0 06/13/24 1928        Opportunity for questions and clarification was provided.    Pt to IR then to ICU.

## 2024-06-14 NOTE — ED PROVIDER NOTES
PAD (peripheral artery disease) ([facility])     Polypharmacy          SURGICAL HISTORY       Past Surgical History:   Procedure Laterality Date    AORTIC VALVE REPLACEMENT  11/30/2018    TAVR    BLADDER REPAIR  07/24/2018    Unspecified procedure on bladder    BLADDER SUSPENSION  06/2011    Mesh bladder suspension for pelvic prolapse    CHOLECYSTECTOMY      CHOLECYSTECTOMY  01/23/2013    COLONOSCOPY  10/10/2018    CORONARY ANGIOPLASTY WITH STENT PLACEMENT  09/25/2018    RCA    GI  2008    Open repair Zenker's diverticulum    GYN  07/24/2018    Endometrial biopsy, repair of perineum vaginal closure    KNEE ARTHROSCOPY  1958    LITHOTRIPSY Left 1987    ORTHOPEDIC SURGERY Right 06/15/2017    Right hip titanium hannah insertion after fracture    TOTAL COLECTOMY  11/09/2009    Partial colectomy due to diverticular disease         CURRENT MEDICATIONS       Previous Medications    ASPIRIN 81 MG CHEWABLE TABLET    Take 1 tablet by mouth daily    ATORVASTATIN (LIPITOR) 80 MG TABLET    Take 1 tablet by mouth daily    BUDESONIDE (ENTOCORT EC) 3 MG EXTENDED RELEASE CAPSULE    budesonide DR - ER 3 mg capsule,delayed,extended release   TAKE 3 CAPSULES BY MOUTH EVERY DAY FOR 1 MONTH THEN TAKE 2 CAPSULES BY MOUTH EVERY DAY FOR 1 MONTH THEN TAKE 1 CAPSULE BY MOUTH EVERY DAY FO    CARVEDILOL (COREG) 25 MG TABLET    Take 1 tablet by mouth 2 times daily (with meals)    CEFPODOXIME (VANTIN) 200 MG TABLET    Take 1 tablet by mouth 2 times daily    COLESTIPOL (COLESTID) 1 G TABLET    TAKE 1 TO 2 TABLETS BY MOUTH AS NEEDED FOR DIARRHEA    FUROSEMIDE (LASIX) 40 MG TABLET    Take 1 tablet by mouth daily as needed (leg swelling causing pain)    GABAPENTIN (NEURONTIN) 300 MG CAPSULE    Take 1 capsule by mouth 3 times daily for 180 days. Intended supply: 90 days Max Daily Amount: 900 mg    METHOCARBAMOL (ROBAXIN) 750 MG TABLET    TAKE 1 TABLET BY MOUTH EVERY 4 TO 6 HOURS FOR 7 DAYS AS NEEDED    MISC. DEVICES (WALKER) MISC    1 each by Does not apply  140's. Family at bedside. [CC]      ED Course User Index  [CC] Shailesh Rosas, DO           CONSULTS:  None    PROCEDURES:  Unless otherwise noted below, none     Procedures      DIFFERENTIAL DIAGNOSIS/MDM:   Medical Decision Making  Patient is an 79-year-old female presenting today with acute hemorrhagic shock secondary to a GI bleed from transverse colon active bleed.  Not on blood thinners but is on Plavix.  Upon arrival she was intermittently altered with bouts of decreased responsiveness, having large volumes of blood per rectum with hypotension.  2 units of emergency uncrossed blood ordered.  She was able to consent for me as she did have periods of being lucid however nonetheless this was an emergent situation and she needed the blood.  After the 2 units of blood when in her vital signs, mental status and color all improved drastically.  Family arrived and I discussed with them at length.  CT showing active bleed so I called IR who took this patient emergently to the IR suite for coil embolization.  Patient admitted to the ICU for further management.    Problems Addressed:  Gastrointestinal hemorrhage, unspecified gastrointestinal hemorrhage type: acute illness or injury  Hemorrhagic shock (HCC): acute illness or injury    Amount and/or Complexity of Data Reviewed  Labs: ordered. Decision-making details documented in ED Course.  Radiology: ordered and independent interpretation performed. Decision-making details documented in ED Course.    Risk  Decision regarding hospitalization.          FINAL IMPRESSION      1. Gastrointestinal hemorrhage, unspecified gastrointestinal hemorrhage type    2. Hemorrhagic shock (HCC)          DISPOSITION/PLAN   DISPOSITION Decision To Admit 06/13/2024 07:52:46 PM    Perfect Serve Consult for Admission  8:19 PM    ED Room Number: ER13/13  Patient Name and age:  Sujata Hooper Ernandez 79 y.o.  female  Working Diagnosis:   1. Gastrointestinal hemorrhage, unspecified  gastrointestinal hemorrhage type    2. Hemorrhagic shock (HCC)        COVID-19 Suspicion: No  Sepsis present:  No  Reassessment needed: No  Code Status:  Full Code  Readmission: No  Isolation Requirements: no  Recommended Level of Care: ICU  Department: [place] [facility] - [phone]  79 y.o. female here with significant GI bleed. Hypotensive upon arrival. Received 2u emergent blood. Going to IR with Dr. Hidalgo for IR embolization.     Total critical care time spent exclusive of procedures:  65 minutes.   Due to a high probability of clinically significant, life threatening deterioration, the patient required my highest level of preparedness to intervene emergently and I personally spent this critical care time directly and personally managing the patient. This critical care time included obtaining a history; examining the patient; pulse oximetry; ordering and review of studies; arranging urgent treatment with development of a management plan; evaluation of patient's response to treatment; frequent reassessment; and, discussions with other providers.  This critical care time was performed to assess and manage the high probability of imminent, life-threatening deterioration that could result in multi-organ failure. It was exclusive of separately billable procedures and treating other patients and teaching time.        (Please note that portions of this note were completed with a voice recognition program.  Efforts were made to edit the dictations but occasionally words are mis-transcribed.)    Shailesh Rosas DO (electronically signed)  Emergency Attending Physician               Shailesh Rosas DO  06/13/24 [id number]

## 2024-06-14 NOTE — PLAN OF CARE
BRIEF IR CONSULT    Ms. Ernandez is an 79 y.o. woman with AS s/p TAVR, CAD (RCA stent in 2018, usually on Plavix but it is currently held), HLD, PAD admitted yesterday 6/13 with LGIB. Prior to admission, she was being treated for uncomplicated UTI. She presented to the ED yesterday with hematochezia. A CTA showed active bleeding in the transverse colon, for which IR was originally consulted and performed urgent embolization of the bleeding branch. Apparently after the embolization, she did complain of RLQ pain. This afternoon, she became somnolent and hypotensive requiring fluid boluses, pressors x 2, and intubation. Repeat imaging was performed and showed moderate burden of right-sided PE but no evidence of heart strain. CT could not exclude new hemorrhage due to contrast in colon from prior imaging.     #Shock  - Etiology is unclear:   - The burden of PE is relatively small-moderate, is nonobstructive, and is confined to a single lung. Further there is no right heart strain on the CT. Doubt that this profound shock is related to the PE given lack of obvious heart strain and relatively small-moderate volume. Would not pursue thrombectomy at this time. That said, would place IVC filter to prevent further PE, as she cannot receive AC.   - Septic shock is possible. Patient had a UTI prior to this hospitalization, but was receiving appropriate treatment. Patient could also be developing ischemia from the embolization, although there were no signs of ischemia (e.g. bowel wall thickening, inflammation, nonenahcement) on the CT. Agree with broad spectrum antibiotics.   - Doubt hemorrhagic shock as Hgb is now stable. Would not pursue any further angiogram without evidence of bleeding.   - Please do not hesitate to reach out to me with any changes or questions.     Corrie Jimenes MD  [facility]  8:19 PM, 6/14/2024

## 2024-06-14 NOTE — CONSULTS
Name: Sujata Ernandez MRN: [id number]   : 1945 Hospital: [facility] [place]   Date: 2024        Impression Plan   SMA bleed/transverse colon  Hypotension  Encephalopathy  Hyperkalemia  Hypomagnesemia  Leukocytosis               CTA abdomen/pelvis stat  Type and screen/CBC stat  Levophed with goal MAP>65  IV fluid  Serial CBCs  Discussed findings with daughter at bedside. Discussed a central line. Explained that if pt declines further, she will need to be intubated. Daughter is agreeable.        Pt is critically ill. Critical care time spent with pt exclusive of procedures was 58 minutes    Radiology  ( personally reviewed) CT abdomen results reviewed   ABG Invalid input(s): \"PHI\", \"PO2I\", \"PCO2I\"       Subjective     This patient has been seen and evaluated at the request of Dr. Teran for hypotension.  Patient is a 79 y.o. female with PMHx of PAD who presenting with hypotension and acute GI bleed on . IR embolized branch of SMA. Pt hemodynamically stable until this afternoon when she became more somnolent and dropped her BP acutely.     Review of Systems:  Review of systems not obtained due to patient factors.    Past Medical History:   Diagnosis Date    Aortic stenosis     Cerebral atrophy (HCC)     Cerebral microvascular disease     Hx of completed stroke     Hypercholesterolemia     Idiopathic peripheral neuropathy     Obese     PAD (peripheral artery disease) (HCC)     Polypharmacy       Past Surgical History:   Procedure Laterality Date    AORTIC VALVE REPLACEMENT  2018    TAVR    BLADDER REPAIR  2018    Unspecified procedure on bladder    BLADDER SUSPENSION  2011    Mesh bladder suspension for pelvic prolapse    CHOLECYSTECTOMY      CHOLECYSTECTOMY  2013    COLONOSCOPY  10/10/2018    CORONARY ANGIOPLASTY WITH STENT PLACEMENT  2018    RCA    GI  2008    Open repair Zenker's diverticulum    GYN  2018    Endometrial biopsy, repair of  Reconciliation, Ar   budesonide (ENTOCORT EC) 3 MG extended release capsule budesonide DR - ER 3 mg capsule,delayed,extended release   TAKE 3 CAPSULES BY MOUTH EVERY DAY FOR 1 MONTH THEN TAKE 2 CAPSULES BY MOUTH EVERY DAY FOR 1 MONTH THEN TAKE 1 CAPSULE BY MOUTH EVERY DAY FO    Automatic Reconciliation, Ar   colestipol (COLESTID) 1 g tablet TAKE 1 TO 2 TABLETS BY MOUTH AS NEEDED FOR DIARRHEA 6/14/21   Automatic Reconciliation, Ar   saccharomyces boulardii (FLORASTOR) 250 MG capsule Take 1 capsule by mouth 2 times daily    Automatic Reconciliation, Ar     [unfilled]  Allergies   Allergen Reactions    Dexlansoprazole Other (See Comments)     Other reaction(s): SEVERE DIARRHEA    Morphine      Other reaction(s): Unknown (comments)  Other reaction(s): Confusion, PERSONALITY CHANGE,NAUSEA  Other reaction(s): Confusion, Other (see comments)    Sulfa Antibiotics Swelling     Facial swelling, rash      Social History     Tobacco Use    Smoking status: Every Day     Current packs/day: 0.25     Types: Cigarettes    Smokeless tobacco: Never   Substance Use Topics    Alcohol use: Never      Family History   Problem Relation Age of Onset    Stroke Maternal Grandmother     Heart Attack Brother     Heart Attack Father     Stroke Mother     Thyroid Disease Daughter     Kidney Disease Son         kidney stone          Laboratory: I have personally reviewed the critical care flowsheet and labs.     Recent Labs     06/13/24 1916 06/13/24 2040 06/14/24  0511   WBC 11.5*  --  12.9*   HGB 9.7* 10.3* 10.9*   HCT 30.3* 32.8* 34.2*     --  140*     Recent Labs     06/13/24 1916 06/14/24  0511 06/14/24  0848    139  --    K 4.9 5.3*  --     110*  --    CO2 25 25  --    BUN 17 18  --    MG  --   --  1.4*   PHOS  --   --  3.8   ALT 16 15  --    INR 1.1  --   --        Objective:     Mode Rate Tidal Volume Pressure FiO2 PEEP                    Vital Signs:     TMAX(24)      Intake/Output:   Last shift:         Last 3

## 2024-06-14 NOTE — PROGRESS NOTES
Got called regarding patients blood pressure suddenly trending low and pt much more somnolent. Pt ashen appearing. SBP reading 44, strong femoral pulse and pt arousing with noxious stimuli, although confused. NS bolus started and levophed hung, currently at 15 mcg/min. BP slowly improving. Sats 97% and HR 84. Stat type and screen/CBC sent. Stat CTA abdomen/pelvis ordered for when pt stable enough to go down for imaging- rebleed suspected.

## 2024-06-14 NOTE — PROGRESS NOTES
Spiritual Care Assessment/Progress Note  [facility]    Name: Sujata Ernandez MRN: [id number]    Age: 79 y.o.     Sex: female   Language: English     Date: 6/14/2024            Total Time Calculated: 10 min              Spiritual Assessment begun in [facility] A4 INTENSIVE CARE UNIT  Service Provided For: Friend (Caregiver)  Referral/Consult From: Rounding  Encounter Overview/Reason: Initial Encounter    Spiritual beliefs:      [x] Involved in a chele tradition/spiritual practice: [employer]     [] Supported by a chele community:      [] Claims no spiritual orientation:      [] Seeking spiritual identity:           [] Adheres to an individual form of spirituality:      [] Not able to assess:                Identified resources for coping and support system:   Support System: Family members       [x] Prayer                  [] Devotional reading               [] Music                  [] Guided Imagery     [] Pet visits                                        [] Other: (COMMENT)     Specific area/focus of visit   Encounter:    Crisis:    Spiritual/Emotional needs:    Ritual, Rites and Sacraments:    Grief, Loss, and Adjustments:    Ethics/Mediation:    Behavioral Health:    Palliative Care:    Advance Care Planning:           Narrative:   Narrative: Visited Ms Ernandez in room A468 for initial spiritual assessment. Reviewed patient's chart prior to visit.  Ms Ernandez appeared to be sleeping. Patient's caregiver was at her bedside.    Intervention: Provided spiritual presence and active listening as patient's caregiver shared that she felt Ms Ernandez was doing pretty good. Shared that patient's daughter had gone home and would be returning later today. Caregiver said that Ms Ernandez attended [employer] with her daughter and she felt patient and family would like to be kept in prayer. Assured her of prayers on their behalf and of ongoing  availability for support.    Outcome:

## 2024-06-14 NOTE — PROGRESS NOTES
[facility]    Hospitalist Progress Note    NAME: Sujata Ernandez   :  1945  MRM:  [id number]    Date/Time of service 2024  2:08 PM    To assist coordination of care and communication with nursing and staff, this note may be preliminary early in the day, but finalized by end of the day.        Assessment and Plan:     Acute GI bleeding / Gastrointestinal hemorrhage / Chronic colitis - Followed by GI, has been on budesonide, colestipol and pancreatic enzymes.  ER consulted IR who found active hemorrhage in the proximal transverse colon, and did an embolization.  I fear there will be resulting consequences of ischemia. Continue probiotic     Anemia / Thrombocytopenia - POA and worse than baseline due to acute bleeding, and will worsen with hydration. Check serologies.    Hemorrhagic shock / Hypotension - POA, blood loss, dehydration and polypharmacy may contribute.  Hydrated but worsened.  Now on pressers.      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 Last visit she required sedation in hospital for . Continue seroquel     Chronic back pain / Idiopathic peripheral neuropathy / Polypharmacy - POA.  PT eval.  Prn lidoderm. Consider resuming ultram and gabapentin if more alert. Hold oxycodone due to shock     Coronary artery disease / Hypertension - BP low, but otherwise stable.  Hold Plavix and ASA due to bleed. If BP rises, resume coreg and lasix, otherwise stop these     Leg wounds / Psoriasis / LE edema / Hypoalbuminemia / Peripheral artery disease / Leukocytosis - Continue atorvastatin when eating, for PAD.  I do not think her legs have any acute bacterial infection.     Aortic stenosis / History of aortic valve replacement - Appears stable.        Infrarenal  abdominal aortic aneurysm (AAA) without rupture - Outpatient follow up      Obese - Advise weight loss.       Subjective:     Chief Complaint:  bleeding stopped, BP worsening    ROS:  (bold if positive, if negative)    Tolerating some PT   NPO        Objective:     Last 24hrs VS reviewed since prior progress note. Most recent are:    Vitals:    06/14/24 0900 06/14/24 0930 06/14/24 1000 06/14/24 1030   BP: 129/71 117/67 115/67 118/61   Pulse: 99 82 82 79   Resp: 13 12 12 14   Temp:       TempSrc:       SpO2: 100%      Weight:          @[id number]@       Intake/Output Summary (Last 24 hours) at 6/14/2024 0844  Last data filed at 6/14/2024 0400  Gross per 24 hour   Intake 792.85 ml   Output 275 ml   Net 517.85 ml        Physical Exam:    Gen:  Obese, in no acute distress  HEENT:  Pink conjunctivae, PERRL, hearing intact to voice, moist mucous membranes  Neck:  Supple, without masses, thyroid non-tender  Resp:  No accessory muscle use, clear breath sounds without wheezes rales or rhonchi  Card:  No murmurs, normal S1, S2 without thrills, bruits, 1+ peripheral edema  Abd:  Soft, non-tender, non-distended, normoactive bowel sounds are present, no mass  Lymph:  No cervical or inguinal adenopathy  Musc:  No cyanosis or clubbing  Skin:  Chronic LE ulcers, skin turgor is good  Neuro:  Cranial nerves are grossly intact, general motor weakness, follows commands   Psych:  Poor insight, oriented to person, place and time, alert    Telemetry reviewed:   normal sinus rhythm  __________________________________________________________________  Medications Reviewed: (see below)  Medications:     Current Facility-Administered Medications   Medication Dose Route Frequency    oxyCODONE (ROXICODONE) immediate release tablet 5 mg  5 mg Oral Q4H PRN    0.9 % sodium chloride infusion   IntraVENous Continuous    glucose chewable tablet 16 g  4 tablet Oral PRN    dextrose bolus 10% 125 mL  125 mL IntraVENous PRN    Or    dextrose bolus   06/13/24 1916 06/13/24 2040 06/14/24  0511   WBC 11.5*  --  12.9*   HGB 9.7* 10.3* 10.9*   HCT 30.3* 32.8* 34.2*     --  140*     Recent Labs     06/13/24 1916 06/14/24  0511    139   K 4.9 5.3*    110*   CO2 25 25   GLUCOSE 243* 171*   BUN 17 18   CREATININE 0.63 0.55   CALCIUM 8.1* 7.5*   AST 9* 17   ALT 16 15     Lab Results   Component Value Date/Time    GLUCOSE 171 06/14/2024 05:11 AM    GLUCOSE 243 06/13/2024 07:16 PM    GLUCOSE 93 04/24/2024 12:10 PM    GLUCOSE 121 02/25/2024 03:01 AM    GLUCOSE 108 02/24/2024 01:46 PM    GLUCOSE 210 02/13/2024 03:23 PM    GLUCOSE 123 01/16/2024 09:29 AM     No results for input(s): \"PH\", \"PCO2\", \"PO2\", \"HCO3\", \"FIO2\" in the last 72 hours.  Recent Labs     06/13/24 1916   INR 1.1     Results       Procedure Component Value Units Date/Time    Culture, Urine [[id number]]     Order Status: Sent Specimen: Urine, straight catheter             Other pertinent lab: none    Total time spent with patient: 45 Minutes intensive care I personally reviewed chart, notes, data and current medications in the medical record.  I have personally examined and treated the patient at bedside during this period.                  Care Plan discussed with: Patient, Family, Care Manager, Nursing Staff, Consultant/Specialist, and >50% of time spent in counseling and coordination of care    Discussed:  Care Plan and D/C Planning    Prophylaxis:  SCD's and H2B/PPI    Disposition:  Home w/Family           ___________________________________________________    Attending Physician: Wilver Teran MD

## 2024-06-14 NOTE — PROCEDURES
PROCEDURE NOTE  Date: 6/13/2024   Name: Sujata Ernandez  YOB: 1945    Procedures    Interventional Radiology  Procedure Note        6/13/2024 10:13 PM    Patient: Sujata Ernandez     Informed consent obtained    Diagnosis: Lower GI bleed    Procedure(s): Mesenteric angiogram with embolization    Findings: Active bleed arising in the proximal transverse colon from a branch off the SMA. Successfully coil embolized. Right groin arteriotomy closed with manual pressure (unsafe for closure device due to severe atherosclerotic plaque).     EBL: Minimal    Specimens removed: None    Complications: None    Primary Physician: Anil Hidalgo MD    Recomendations:   - Bedrest and right leg flat x 6 hours  - Monitor H/H  - Repeat CTA if there is acute change or drop in Hgb    Full dictated report to follow    Signed By: Anil Hidalgo MD

## 2024-06-14 NOTE — PROGRESS NOTES
1320- This RN and Ly RN at bedside. BP 67/46 (55). Dr Saleh aware, 500mL NS bolus given, BP responding to fluid. See MAR for additional orders-    1445- in route to CT  1520- Pt returned to unit  1540- verbal consent obtained for central line and arterial line from daughter, Valarie, at bedside by Dr Saleh  1550- Central line in place, CXR ordered. Levophed and NS being titrated per verbal order from Dr Saleh  1557- verbal consent for intubation obtained from daughter, Valarie, from Dr. Saleh at bedside  1610- Pt intubated, ETT 7.5 23@ gums. Esophageal temp 97.4  1630- Arterial line attempted radially and femorally, attempts unsuccessful, continue cuff pressure q5 min per Dr Saleh  1730- VBG done with epoc, 1 amp bicarb ordered.   1830- CXR, KUB and duplex study completed  1835- CT results from radiology given to Dr Saleh at this time.   1840- Dr Saleh at bedside updating family, goals of care conversation at this time.   1910- Bedside and Verbal shift change report given to RYAN Peter and RYAN Johnson  (oncoming nurse) by RYAN Kern(offgoing nurse). Report included the following information Nurse Handoff Report, ED SBAR, Adult Overview, Intake/Output, MAR, Recent Results, Cardiac Rhythm NSR, Alarm Parameters, and Neuro Assessment.

## 2024-06-14 NOTE — CONSULTS
Dorinda German PA-C                       [phone] office             Monday-Friday 8:00 am-4:30 pm  I am not permitted to use \"perfect serve\" use above for contact, thanks.      Gastroenterology Consultation Note      Admit Date: 6/13/2024  Consult Date: 6/14/2024   I greatly appreciate your asking me to see Sujata Ernandez, thank you very much for the opportunity to participate in her care.    Narrative Assessment and Plan   79 yoF admitted with rectal bleeding, s/p IR embolization of a branch of the SMA yesterday, with one bloody bowel movement yesterday evening and none since then. Has had abdominal pain this morning, is sleepy but expresses pain when bilateral quadrants of lower abdomen palpated. Hgb stable from 10.3 last night to 10.9 this morning. Lactic acid decreased from 3.0 last night to 2.0 this morning.     Impression:  Lower GI bleeding - stabilized    Plan:   Continue with supportive care including IV fluids, initiate IV cipro and flagyl to cover for bacterial translocation in the case of ischemia.   If patient develops bleeding, worsening leukocytosis, she may require repeat CTA and surgical evaluation for ischemia.   Would consider outpatient follow up for EGD colonoscopy for further evaluation of anemia.     Dorinda German PA-C    Subjective:     Chief Complaint: melena    History of Present Illness: 79 yoF admitted 6/13/24 with rectal bleeding, underwent successful IR embolization of active bleed in a branch of the superior mesenteric artery. Small bloody bowel movement reported last night, no further overnight or this morning. Patient has been having abdominal pain and expresses TTP in the bilateral lower abdomen on exam.     Last colonoscopy 3/9/21 by Dr. Harris: diverticulosis, internal hemorrhoids, three polyps, tubular adenoma on path, lymphocytic colitis on path.     Last EGD 11/21/23 by Dr. Bardales:  g/dL    Globulin 2.6 2.0 - 4.0 g/dL    Albumin/Globulin Ratio 0.8 (L) 1.1 - 2.2     Protime-INR    Collection Time: 06/13/24  7:16 PM   Result Value Ref Range    INR 1.1 0.9 - 1.1      Protime 11.3 (H) 9.0 - 11.1 sec   APTT    Collection Time: 06/13/24  7:16 PM   Result Value Ref Range    APTT 21.4 (L) 22.1 - 31.0 sec    Therapeutic Range   58.0 - 77.0 SECS   Troponin    Collection Time: 06/13/24  7:16 PM   Result Value Ref Range    Troponin, High Sensitivity 18 0 - 51 ng/L   Extra Tubes Hold    Collection Time: 06/13/24  7:21 PM   Result Value Ref Range    Specimen HOld SST,RED     Comment:        Add-on orders for these samples will be processed based on acceptable specimen integrity and analyte stability, which may vary by analyte.   Lactic Acid    Collection Time: 06/13/24  8:40 PM   Result Value Ref Range    Lactic Acid, Plasma 3.0 (HH) 0.4 - 2.0 MMOL/L   Hemoglobin and Hematocrit    Collection Time: 06/13/24  8:40 PM   Result Value Ref Range    Hemoglobin 10.3 (L) 11.5 - 16.0 g/dL    Hematocrit 32.8 (L) 35.0 - 47.0 %   CBC with Auto Differential    Collection Time: 06/14/24  5:11 AM   Result Value Ref Range    WBC 12.9 (H) 3.6 - 11.0 K/uL    RBC 3.85 3.80 - 5.20 M/uL    Hemoglobin 10.9 (L) 11.5 - 16.0 g/dL    Hematocrit 34.2 (L) 35.0 - 47.0 %    MCV 88.8 80.0 - 99.0 FL    MCH 28.3 26.0 - 34.0 PG    MCHC 31.9 30.0 - 36.5 g/dL    RDW 22.1 (H) 11.5 - 14.5 %    Platelets 140 (L) 150 - 400 K/uL    MPV 9.9 8.9 - 12.9 FL    Nucleated RBCs 0.0 0  WBC    nRBC 0.00 0.00 - 0.01 K/uL    Neutrophils % 70 32 - 75 %    Lymphocytes % 25 12 - 49 %    Monocytes % 2 (L) 5 - 13 %    Eosinophils % 1 0 - 7 %    Basophils % 0 0 - 1 %    Metamyelocytes 2 (H) 0 %    Immature Granulocytes % 0 %    Neutrophils Absolute 9.0 (H) 1.8 - 8.0 K/UL    Lymphocytes Absolute 3.2 0.8 - 3.5 K/UL    Monocytes Absolute 0.3 0.0 - 1.0 K/UL    Eosinophils Absolute 0.1 0.0 - 0.4 K/UL    Basophils Absolute 0.0 0.0 - 0.1 K/UL    Immature  Granulocytes Absolute 0.0 K/UL    Differential Type MANUAL      RBC Comment ANISOCYTOSIS  2+       Comprehensive Metabolic Panel    Collection Time: 06/14/24  5:11 AM   Result Value Ref Range    Sodium 139 136 - 145 mmol/L    Potassium 5.3 (H) 3.5 - 5.1 mmol/L    Chloride 110 (H) 97 - 108 mmol/L    CO2 25 21 - 32 mmol/L    Anion Gap 4 (L) 5 - 15 mmol/L    Glucose 171 (H) 65 - 100 mg/dL    BUN 18 6 - 20 MG/DL    Creatinine 0.55 0.55 - 1.02 MG/DL    BUN/Creatinine Ratio 33 (H) 12 - 20      Est, Glom Filt Rate >90 >60 ml/min/1.73m2    Calcium 7.5 (L) 8.5 - 10.1 MG/DL    Total Bilirubin 0.4 0.2 - 1.0 MG/DL    ALT 15 12 - 78 U/L    AST 17 15 - 37 U/L    Alk Phosphatase 53 45 - 117 U/L    Total Protein 4.6 (L) 6.4 - 8.2 g/dL    Albumin 2.0 (L) 3.5 - 5.0 g/dL    Globulin 2.6 2.0 - 4.0 g/dL    Albumin/Globulin Ratio 0.8 (L) 1.1 - 2.2           Assessment/Plan:     Principal Problem:    Acute GI bleeding  Active Problems:    Coronary artery disease involving native coronary artery of native heart    Idiopathic peripheral neuropathy    History of aortic valve replacement with tissue graft    Psoriasis    HTN (hypertension), benign    Chronic colitis    Hx of completed stroke    Confusion    Infrarenal abdominal aortic aneurysm (AAA) without rupture (HCC)    Obese    Hypercholesterolemia    Cerebral microvascular disease    Cerebral atrophy (HCC)    Aortic stenosis    PAD (peripheral artery disease) (HCC)    Polypharmacy    Chronic back pain    Anemia    GI (gastrointestinal hemorrhage)    Hyperkalemia    Hypoalbuminemia    Hypotension    Leukocytosis    Thrombocytopenia (HCC)  Resolved Problems:    * No resolved hospital problems. *       See above narrative for full detail.

## 2024-06-14 NOTE — H&P
Patient: Sujata Ernandez   79 y.o. female   : 1945   MRN: [id number] Attending: Garrett Lugo DO  CODE STATUS: Full Code   PRIMARY CARE MD: Amanda Paulino, APRN - NP      ASSESSMENT & PLAN  Unstable GI hemorrhage associated with hypotension.  S/p coil embolization by IR of superior mesenteric artery.  Continue to monitor hemoglobin/hematocrit which is stable thus far.  Request gastroenterology consultation.  Right lower quadrant pain.  This pain started after the embolization procedure.  Embolization procedures can be very painful.  Oxycodone 5 mg p.o. every 4 hours as needed along with breakthrough Dilaudid 0.25 mg IV every 4 hours as needed.  Hypotension.  Certainly, the opiates are not helping much with the hypotension, but patient was in such discomfort, we decided to order the above opiates anyway.  IV fluids are being given at 100 cc/h.  Hold all blood pressure medicine at this time including Lasix, Coreg.  Lactic acidosis of 3.0.  Likely secondary to hypotension and hypoperfusion.  IV fluids as above.  Continue to monitor.  Coronary artery disease.  S/p stent to right coronary artery in 2018.  Hold Plavix at this time due to GI hemorrhage.  Urinary tract infection.  Patient was being treated with cefpodoxime outpatient for urinary tract infection.  We will treat with IV ceftriaxone while here.  S/p partial colectomy.  Patient does have chronic diarrhea.  Class I obesity.  Current BMI is 32.04.  Patient is a candidate for obstructive sleep apnea.  Patient's daughter states that patient has never had a polysomnogram that she is aware of.  Nursing staff have noted the patient to have apneic episodes while sleeping.        Chief Complaint:   Chief Complaint   Patient presents with    Rectal Bleeding       History Gathered From: patient    History of Present Illness  Patient is an 79-year-old female who lives at home with her daughter.  She is on Plavix, and it appears that she

## 2024-06-14 NOTE — CARE COORDINATION
06/14/24 1431   Service Assessment   Patient Orientation Other (see comment)  (off unit in CT so I received history from daughter,Rosalee Ernandez)   Cognition Other (see comment)  (off unit)   History Provided By Child/Family   Primary Caregiver Family   Support Systems Children;Family Members;Other (Comment)  (paid caregivers)   Patient's Healthcare Decision Maker is: Legal Next of Kin  (daughter-Rosalee Ernandez)   PCP Verified by CM Yes   Last Visit to PCP Within last 3 months   Prior Functional Level Assistance with the following:;Bathing;Dressing;Toileting;Cooking;Housework;Shopping;Mobility   Current Functional Level Assistance with the following:;Bathing;Dressing;Toileting;Cooking;Housework;Shopping;Mobility   Can patient return to prior living arrangement Yes   Ability to make needs known: Fair   Family able to assist with home care needs: Yes   Would you like for me to discuss the discharge plan with any other family members/significant others, and if so, who?   (daughter)   Financial Resources Medicare   Community Resources Health Education;Other (Comment)  (Palliative medicine)   CM/SW Referral Disease Management Education;Other (see comment)  (daughter is requesting a Palliative [employer] consultation)   Social/Functional History   Lives With Daughter;Family   Type of Home House   Home Layout Two level;Performs ADL's on one level;Able to Live on Main level with bedroom/bathroom   Home Access Ramped entrance   Bathroom Shower/Tub Walk-in shower   Bathroom Toilet Handicap height   Bathroom Equipment Grab bars in shower;Built-in shower seat;3-in-1 commode   Bathroom Accessibility Walker accessible   Home Equipment Electric scooter;Grab bars;Mechanical lift;Wheelchair - Manual   Receives Help From Family;Personal care attendant   ADL Assistance Needs assistance   Bath Dependent/Total   Dressing Dependent/Total   Grooming Dependent/Total   Feeding Supervision   Toileting Needs assistance  (is dependent on

## 2024-06-14 NOTE — PROGRESS NOTES
[facility] Dosing Services: Antimicrobial Stewardship Daily Doc  Consult for antibiotic dosing of Vancomycin by Dr. Saleh  Indication: Sepsis  Day of Therapy: 1    Ht Readings from Last 1 Encounters:   02/24/24 1.575 m (5' 2\")        Wt Readings from Last 1 Encounters:   06/13/24 79.5 kg (175 lb 3.2 oz)      Vancomycin therapy:  Loading dose: Vancomycin 2000 mg x1 dose now/given  Maintenance dose: Vancomycin 1000 mg IV every 12 hours   Dose calculated to approximate a           a. Target AUC/MICHAELLE of 400-600          b. Trough of 15-20 mcg/mL   Last level:   mcg/mL    A/Plan:   Will dose scheduled vancomycin 1000 mg every 12 hours (predicted , Tr  13.7, T1/2 = 10.7 hours)  Consider vancomycin level within 24-36 hours (not yet ordered)  CBC, CMP ordered    Dose administration notes: Doses given appropriately as scheduled        Other Antimicrobial   (not dosed by pharmacist) Cefepime 2g q8h   Cultures    Serum Creatinine Lab Results   Component Value Date/Time    CREATININE 0.5 06/14/2024 05:23 PM    CREATININE 0.60 06/14/2024 01:55 PM          Creatinine Clearance Estimated Creatinine Clearance: 88 mL/min (A) (based on SCr of 0.5 mg/dL (L)).     Temp Temp: 97.9 °F (36.6 °C) (Esophageal)       WBC Lab Results   Component Value Date/Time    WBC 21.2 06/14/2024 06:36 PM          Procalcitonin Lab Results   Component Value Date/Time    PROCAL 0.09 06/14/2024 08:48 AM      For Antifungals, Metronidazole and Nafcillin: Lab Results   Component Value Date/Time    ALT 15 06/14/2024 05:11 AM    AST 17 06/14/2024 05:11 AM        Pharmacist: Carlos Valentin, [facility]  [phone]

## 2024-06-14 NOTE — PROGRESS NOTES
I discussed clinic findings with one of the daughters and son at bedside (Third daughter on her way in). Explained that patient had extreme shock but was not showing signs of bleeding with stable cbc (repeat pending).   PE found on CT chest was not causing RV dilation and therefore was very unlikely to cause this severe hemodynamic compromise. If CBC comes back with lower hgb will order nuclear bleeding scan stat. Case was discussed with Dr. Corrie Jimenes who did not feel that the amount of clot burden seen on CT explained the hemodynamic compromise. Pt cannot get anticoagulation due to GI bleed and thrombectomy very limited benefit and would pt at risk.   Troponin and lactic acid pending. Antibiotics empirically added. Pt very fluid responsive to boluses- another 500 cc given for total of 4.5 liters. Pt oxygenating well on vent. Currently on Levophed 50 mcg/min and vasopressin 0.04.     Son and daughter expressing wish to continue with pressors and ventilator support, but should pt suffer cardiovascular collapse/cardiac arrest, they do not want attempts for resuscitation.     Cctime outside procedures 68 min

## 2024-06-14 NOTE — PROGRESS NOTES
TRANSFER - OUT REPORT:    Verbal report given to jan reilly(name) on Sujata Ernandez being transferred to 468 icu(unit) for routine post-op       Report consisted of patient's Situation, Background, Assessment and   Recommendations(SBAR).     Information from the following report(s) Nurse Handoff Report was reviewed with the receiving nurse.    Opportunity for questions and clarification was provided.      Patient transported with:   Registered Nurse

## 2024-06-15 ENCOUNTER — APPOINTMENT (OUTPATIENT)
Facility: HOSPITAL | Age: 79
DRG: 853 | End: 2024-06-15
Payer: MEDICARE

## 2024-06-15 ENCOUNTER — APPOINTMENT (OUTPATIENT)
Facility: HOSPITAL | Age: 79
DRG: 853 | End: 2024-06-15
Attending: INTERNAL MEDICINE
Payer: MEDICARE

## 2024-06-15 LAB
ALBUMIN SERPL-MCNC: 1.4 G/DL (ref 3.5–5)
ALBUMIN SERPL-MCNC: 1.4 G/DL (ref 3.5–5)
ALBUMIN SERPL-MCNC: 1.6 G/DL (ref 3.5–5)
ALBUMIN/GLOB SERPL: 0.6 (ref 1.1–2.2)
ALP SERPL-CCNC: 63 U/L (ref 45–117)
ALP SERPL-CCNC: 66 U/L (ref 45–117)
ALP SERPL-CCNC: 66 U/L (ref 45–117)
ALT SERPL-CCNC: 21 U/L (ref 12–78)
ALT SERPL-CCNC: 463 U/L (ref 12–78)
ALT SERPL-CCNC: 466 U/L (ref 12–78)
ANION GAP SERPL CALC-SCNC: 10 MMOL/L (ref 5–15)
ANION GAP SERPL CALC-SCNC: 6 MMOL/L (ref 5–15)
AST SERPL-CCNC: 24 U/L (ref 15–37)
AST SERPL-CCNC: 582 U/L (ref 15–37)
AST SERPL-CCNC: 586 U/L (ref 15–37)
BASE DEFICIT BLDV-SCNC: 13.8 MMOL/L
BASE DEFICIT BLDV-SCNC: 19.1 MMOL/L
BASE DEFICIT BLDV-SCNC: 8.7 MMOL/L
BDY SITE: ABNORMAL
BILIRUB DIRECT SERPL-MCNC: 0.3 MG/DL (ref 0–0.2)
BILIRUB SERPL-MCNC: 0.6 MG/DL (ref 0.2–1)
BILIRUB SERPL-MCNC: 0.6 MG/DL (ref 0.2–1)
BILIRUB SERPL-MCNC: 0.8 MG/DL (ref 0.2–1)
BUN SERPL-MCNC: 21 MG/DL (ref 6–20)
BUN SERPL-MCNC: 30 MG/DL (ref 6–20)
BUN/CREAT SERPL: 38 (ref 12–20)
BUN/CREAT SERPL: 48 (ref 12–20)
CALCIUM SERPL-MCNC: 6.8 MG/DL (ref 8.5–10.1)
CALCIUM SERPL-MCNC: 7.1 MG/DL (ref 8.5–10.1)
CHLORIDE SERPL-SCNC: 119 MMOL/L (ref 97–108)
CHLORIDE SERPL-SCNC: 120 MMOL/L (ref 97–108)
CO2 SERPL-SCNC: 14 MMOL/L (ref 21–32)
CO2 SERPL-SCNC: 18 MMOL/L (ref 21–32)
CREAT SERPL-MCNC: 0.44 MG/DL (ref 0.55–1.02)
CREAT SERPL-MCNC: 0.8 MG/DL (ref 0.55–1.02)
ECHO AO ROOT DIAM: 3.3 CM
ECHO AO ROOT INDEX: 1.82 CM/M2
ECHO AR MAX VEL PISA: 4.4 M/S
ECHO AV AREA PEAK VELOCITY: 2.4 CM2
ECHO AV AREA VTI: 1.6 CM2
ECHO AV AREA/BSA PEAK VELOCITY: 1.3 CM2/M2
ECHO AV AREA/BSA VTI: 0.9 CM2/M2
ECHO AV MEAN GRADIENT: 29 MMHG
ECHO AV MEAN VELOCITY: 2.4 M/S
ECHO AV PEAK GRADIENT: 53 MMHG
ECHO AV PEAK VELOCITY: 3.5 M/S
ECHO AV REGURGITANT PHT: 408.8 MILLISECOND
ECHO AV VELOCITY RATIO: 0.6
ECHO AV VTI: 52.6 CM
ECHO BSA: 1.86 M2
ECHO LA DIAMETER INDEX: 1.38 CM/M2
ECHO LA DIAMETER: 2.5 CM
ECHO LA TO AORTIC ROOT RATIO: 0.76
ECHO LV E' LATERAL VELOCITY: 8 CM/S
ECHO LV E' SEPTAL VELOCITY: 4 CM/S
ECHO LV EDV A2C: 37 ML
ECHO LV EDV A4C: 42 ML
ECHO LV EDV BP: 39 ML (ref 56–104)
ECHO LV EDV INDEX A4C: 23 ML/M2
ECHO LV EDV INDEX BP: 22 ML/M2
ECHO LV EDV NDEX A2C: 20 ML/M2
ECHO LV EJECTION FRACTION A2C: 62 %
ECHO LV EJECTION FRACTION A4C: 61 %
ECHO LV EJECTION FRACTION BIPLANE: 60 % (ref 55–100)
ECHO LV ESV A2C: 14 ML
ECHO LV ESV A4C: 17 ML
ECHO LV ESV BP: 16 ML (ref 19–49)
ECHO LV ESV INDEX A2C: 8 ML/M2
ECHO LV ESV INDEX A4C: 9 ML/M2
ECHO LV ESV INDEX BP: 9 ML/M2
ECHO LV FRACTIONAL SHORTENING: 27 % (ref 28–44)
ECHO LV INTERNAL DIMENSION DIASTOLE INDEX: 1.44 CM/M2
ECHO LV INTERNAL DIMENSION DIASTOLIC MMODE: 3.3 CM (ref 3.9–5.3)
ECHO LV INTERNAL DIMENSION DIASTOLIC: 2.6 CM (ref 3.9–5.3)
ECHO LV INTERNAL DIMENSION SYSTOLIC INDEX: 1.05 CM/M2
ECHO LV INTERNAL DIMENSION SYSTOLIC MMODE: 2.5 CM
ECHO LV INTERNAL DIMENSION SYSTOLIC: 1.9 CM
ECHO LV IVSD MMODE: 0.6 CM (ref 0.6–0.9)
ECHO LV IVSD: 1.3 CM (ref 0.6–0.9)
ECHO LV IVSS MMODE: 0.5 CM
ECHO LV MASS 2D: 83.9 G (ref 67–162)
ECHO LV MASS INDEX 2D: 46.3 G/M2 (ref 43–95)
ECHO LV POSTERIOR WALL DIASTOLIC MMODE: 0.6 CM (ref 0.6–0.9)
ECHO LV POSTERIOR WALL DIASTOLIC: 1 CM (ref 0.6–0.9)
ECHO LV POSTERIOR WALL SYSTOLIC MMODE: 0.6 CM
ECHO LV RELATIVE WALL THICKNESS RATIO: 0.77
ECHO LVOT AREA: 4.2 CM2
ECHO LVOT AV VTI INDEX: 0.4
ECHO LVOT DIAM: 2.3 CM
ECHO LVOT MEAN GRADIENT: 7 MMHG
ECHO LVOT PEAK GRADIENT: 18 MMHG
ECHO LVOT PEAK VELOCITY: 2.1 M/S
ECHO LVOT STROKE VOLUME INDEX: 47.7 ML/M2
ECHO LVOT SV: 86.4 ML
ECHO LVOT VTI: 20.8 CM
ECHO MV A VELOCITY: 1.61 M/S
ECHO MV E DECELERATION TIME (DT): 360 MS
ECHO MV E VELOCITY: 0.98 M/S
ECHO MV E/A RATIO: 0.61
ECHO MV E/E' LATERAL: 12.25
ECHO MV E/E' RATIO (AVERAGED): 18.38
ECHO MV E/E' SEPTAL: 24.5
ECHO MV REGURGITANT PEAK GRADIENT: 108 MMHG
ECHO MV REGURGITANT PEAK VELOCITY: 5.2 M/S
ECHO PULMONARY ARTERY END DIASTOLIC PRESSURE: 3 MMHG
ECHO PV MAX VELOCITY: 0.8 M/S
ECHO PV PEAK GRADIENT: 3 MMHG
ECHO PV REGURGITANT MAX VELOCITY: 0.8 M/S
ECHO RV FREE WALL PEAK S': 17 CM/S
ECHO RV INTERNAL DIMENSION: 4.4 CM
ECHO RV TAPSE: 1.2 CM (ref 1.7–?)
ECHO TV REGURGITANT MAX VELOCITY: 2.05 M/S
ECHO TV REGURGITANT PEAK GRADIENT: 17 MMHG
ERYTHROCYTE [DISTWIDTH] IN BLOOD BY AUTOMATED COUNT: 21.5 % (ref 11.5–14.5)
ERYTHROCYTE [DISTWIDTH] IN BLOOD BY AUTOMATED COUNT: 21.6 % (ref 11.5–14.5)
ERYTHROCYTE [DISTWIDTH] IN BLOOD BY AUTOMATED COUNT: 21.6 % (ref 11.5–14.5)
ERYTHROCYTE [DISTWIDTH] IN BLOOD BY AUTOMATED COUNT: 21.9 % (ref 11.5–14.5)
ERYTHROCYTE [DISTWIDTH] IN BLOOD BY AUTOMATED COUNT: 22 % (ref 11.5–14.5)
FIO2 ON VENT: 100 %
GAS FLOW.O2 SETTING OXYMISER: 16
GLOBULIN SER CALC-MCNC: 2.2 G/DL (ref 2–4)
GLOBULIN SER CALC-MCNC: 2.2 G/DL (ref 2–4)
GLOBULIN SER CALC-MCNC: 2.5 G/DL (ref 2–4)
GLUCOSE BLD STRIP.AUTO-MCNC: 149 MG/DL (ref 65–117)
GLUCOSE BLD STRIP.AUTO-MCNC: 153 MG/DL (ref 65–117)
GLUCOSE BLD STRIP.AUTO-MCNC: 160 MG/DL (ref 65–117)
GLUCOSE BLD STRIP.AUTO-MCNC: 183 MG/DL (ref 65–117)
GLUCOSE BLD STRIP.AUTO-MCNC: 204 MG/DL (ref 65–117)
GLUCOSE SERPL-MCNC: 167 MG/DL (ref 65–100)
GLUCOSE SERPL-MCNC: 186 MG/DL (ref 65–100)
HCO3 BLDV-SCNC: 11.6 MMOL/L (ref 23–28)
HCO3 BLDV-SCNC: 16.9 MMOL/L (ref 23–28)
HCO3 BLDV-SCNC: 9 MMOL/L (ref 23–28)
HCT VFR BLD AUTO: 26.5 % (ref 35–47)
HCT VFR BLD AUTO: 27.4 % (ref 35–47)
HCT VFR BLD AUTO: 27.8 % (ref 35–47)
HCT VFR BLD AUTO: 27.9 % (ref 35–47)
HCT VFR BLD AUTO: 28.4 % (ref 35–47)
HGB BLD-MCNC: 8.3 G/DL (ref 11.5–16)
HGB BLD-MCNC: 8.8 G/DL (ref 11.5–16)
HGB BLD-MCNC: 8.8 G/DL (ref 11.5–16)
HGB BLD-MCNC: 8.9 G/DL (ref 11.5–16)
HGB BLD-MCNC: 9 G/DL (ref 11.5–16)
LACTATE SERPL-SCNC: 4.5 MMOL/L (ref 0.4–2)
LACTATE SERPL-SCNC: 5.3 MMOL/L (ref 0.4–2)
LACTATE SERPL-SCNC: 7.2 MMOL/L (ref 0.4–2)
MAGNESIUM SERPL-MCNC: 1.3 MG/DL (ref 1.6–2.4)
MCH RBC QN AUTO: 28.4 PG (ref 26–34)
MCH RBC QN AUTO: 28.4 PG (ref 26–34)
MCH RBC QN AUTO: 28.6 PG (ref 26–34)
MCH RBC QN AUTO: 28.6 PG (ref 26–34)
MCH RBC QN AUTO: 28.9 PG (ref 26–34)
MCHC RBC AUTO-ENTMCNC: 31.3 G/DL (ref 30–36.5)
MCHC RBC AUTO-ENTMCNC: 31.5 G/DL (ref 30–36.5)
MCHC RBC AUTO-ENTMCNC: 31.7 G/DL (ref 30–36.5)
MCHC RBC AUTO-ENTMCNC: 31.7 G/DL (ref 30–36.5)
MCHC RBC AUTO-ENTMCNC: 32.5 G/DL (ref 30–36.5)
MCV RBC AUTO: 89 FL (ref 80–99)
MCV RBC AUTO: 89.7 FL (ref 80–99)
MCV RBC AUTO: 90.2 FL (ref 80–99)
MCV RBC AUTO: 90.6 FL (ref 80–99)
MCV RBC AUTO: 90.8 FL (ref 80–99)
NRBC # BLD: 0 K/UL (ref 0–0.01)
NRBC # BLD: 0.02 K/UL (ref 0–0.01)
NRBC # BLD: 0.04 K/UL (ref 0–0.01)
NRBC # BLD: 0.05 K/UL (ref 0–0.01)
NRBC # BLD: 0.12 K/UL (ref 0–0.01)
NRBC BLD-RTO: 0 PER 100 WBC
NRBC BLD-RTO: 0.1 PER 100 WBC
NRBC BLD-RTO: 0.3 PER 100 WBC
NRBC BLD-RTO: 0.4 PER 100 WBC
NRBC BLD-RTO: 0.9 PER 100 WBC
PCO2 BLDV: 24.9 MMHG (ref 41–51)
PCO2 BLDV: 27.6 MMHG (ref 41–51)
PCO2 BLDV: 34.2 MMHG (ref 41–51)
PEEP RESPIRATORY: 5
PH BLDV: 7.12 (ref 7.32–7.42)
PH BLDV: 7.28 (ref 7.32–7.42)
PH BLDV: 7.3 (ref 7.32–7.42)
PHOSPHATE SERPL-MCNC: 2.9 MG/DL (ref 2.6–4.7)
PLATELET # BLD AUTO: 115 K/UL (ref 150–400)
PLATELET # BLD AUTO: 128 K/UL (ref 150–400)
PLATELET # BLD AUTO: 131 K/UL (ref 150–400)
PLATELET # BLD AUTO: 132 K/UL (ref 150–400)
PLATELET # BLD AUTO: 135 K/UL (ref 150–400)
PMV BLD AUTO: 10.1 FL (ref 8.9–12.9)
PMV BLD AUTO: 10.2 FL (ref 8.9–12.9)
PMV BLD AUTO: 10.7 FL (ref 8.9–12.9)
PO2 BLDV: 37 MMHG (ref 25–40)
PO2 BLDV: 47 MMHG (ref 25–40)
PO2 BLDV: 56 MMHG (ref 25–40)
POTASSIUM SERPL-SCNC: 4.1 MMOL/L (ref 3.5–5.1)
POTASSIUM SERPL-SCNC: 4.8 MMOL/L (ref 3.5–5.1)
PROT SERPL-MCNC: 3.6 G/DL (ref 6.4–8.2)
PROT SERPL-MCNC: 3.6 G/DL (ref 6.4–8.2)
PROT SERPL-MCNC: 4.1 G/DL (ref 6.4–8.2)
RBC # BLD AUTO: 2.92 M/UL (ref 3.8–5.2)
RBC # BLD AUTO: 3.08 M/UL (ref 3.8–5.2)
RBC # BLD AUTO: 3.08 M/UL (ref 3.8–5.2)
RBC # BLD AUTO: 3.1 M/UL (ref 3.8–5.2)
RBC # BLD AUTO: 3.15 M/UL (ref 3.8–5.2)
SAO2 % BLDV: 64.9 % (ref 65–88)
SAO2 % BLDV: 78.8 % (ref 65–88)
SAO2 % BLDV: 80 % (ref 65–88)
SAO2% DEVICE SAO2% SENSOR NAME: ABNORMAL
SERVICE CMNT-IMP: ABNORMAL
SODIUM SERPL-SCNC: 143 MMOL/L (ref 136–145)
SODIUM SERPL-SCNC: 144 MMOL/L (ref 136–145)
SPECIMEN SITE: ABNORMAL
SPECIMEN TYPE: ABNORMAL
SPECIMEN TYPE: ABNORMAL
VT SETTING VENT: 375
WBC # BLD AUTO: 12.8 K/UL (ref 3.6–11)
WBC # BLD AUTO: 13.4 K/UL (ref 3.6–11)
WBC # BLD AUTO: 15.3 K/UL (ref 3.6–11)
WBC # BLD AUTO: 16.1 K/UL (ref 3.6–11)
WBC # BLD AUTO: 19.5 K/UL (ref 3.6–11)

## 2024-06-15 PROCEDURE — 94003 VENT MGMT INPAT SUBQ DAY: CPT

## 2024-06-15 PROCEDURE — 93306 TTE W/DOPPLER COMPLETE: CPT | Performed by: SPECIALIST

## 2024-06-15 PROCEDURE — 6360000002 HC RX W HCPCS: Performed by: INTERNAL MEDICINE

## 2024-06-15 PROCEDURE — 94761 N-INVAS EAR/PLS OXIMETRY MLT: CPT

## 2024-06-15 PROCEDURE — 2580000003 HC RX 258: Performed by: INTERNAL MEDICINE

## 2024-06-15 PROCEDURE — 2580000003 HC RX 258: Performed by: EMERGENCY MEDICINE

## 2024-06-15 PROCEDURE — 2500000003 HC RX 250 WO HCPCS: Performed by: INTERNAL MEDICINE

## 2024-06-15 PROCEDURE — 83605 ASSAY OF LACTIC ACID: CPT

## 2024-06-15 PROCEDURE — C9113 INJ PANTOPRAZOLE SODIUM, VIA: HCPCS | Performed by: INTERNAL MEDICINE

## 2024-06-15 PROCEDURE — 2700000000 HC OXYGEN THERAPY PER DAY

## 2024-06-15 PROCEDURE — 2000000000 HC ICU R&B

## 2024-06-15 PROCEDURE — 82962 GLUCOSE BLOOD TEST: CPT

## 2024-06-15 PROCEDURE — 6360000002 HC RX W HCPCS

## 2024-06-15 PROCEDURE — 99222 1ST HOSP IP/OBS MODERATE 55: CPT | Performed by: SPECIALIST

## 2024-06-15 PROCEDURE — 93306 TTE W/DOPPLER COMPLETE: CPT

## 2024-06-15 PROCEDURE — 85027 COMPLETE CBC AUTOMATED: CPT

## 2024-06-15 PROCEDURE — 36415 COLL VENOUS BLD VENIPUNCTURE: CPT

## 2024-06-15 PROCEDURE — 2500000003 HC RX 250 WO HCPCS: Performed by: EMERGENCY MEDICINE

## 2024-06-15 PROCEDURE — 71045 X-RAY EXAM CHEST 1 VIEW: CPT

## 2024-06-15 PROCEDURE — 83735 ASSAY OF MAGNESIUM: CPT

## 2024-06-15 PROCEDURE — 84100 ASSAY OF PHOSPHORUS: CPT

## 2024-06-15 PROCEDURE — 76770 US EXAM ABDO BACK WALL COMP: CPT

## 2024-06-15 PROCEDURE — 80053 COMPREHEN METABOLIC PANEL: CPT

## 2024-06-15 PROCEDURE — 80076 HEPATIC FUNCTION PANEL: CPT

## 2024-06-15 PROCEDURE — 82803 BLOOD GASES ANY COMBINATION: CPT

## 2024-06-15 RX ORDER — SODIUM CHLORIDE, SODIUM LACTATE, POTASSIUM CHLORIDE, AND CALCIUM CHLORIDE .6; .31; .03; .02 G/100ML; G/100ML; G/100ML; G/100ML
500 INJECTION, SOLUTION INTRAVENOUS ONCE
Status: COMPLETED | OUTPATIENT
Start: 2024-06-15 | End: 2024-06-15

## 2024-06-15 RX ORDER — MAGNESIUM SULFATE HEPTAHYDRATE 40 MG/ML
2000 INJECTION, SOLUTION INTRAVENOUS ONCE
Status: COMPLETED | OUTPATIENT
Start: 2024-06-15 | End: 2024-06-15

## 2024-06-15 RX ADMIN — CEFEPIME 2000 MG: 2 INJECTION, POWDER, FOR SOLUTION INTRAVENOUS at 20:23

## 2024-06-15 RX ADMIN — SODIUM CHLORIDE, POTASSIUM CHLORIDE, SODIUM LACTATE AND CALCIUM CHLORIDE 500 ML: 600; 310; 30; 20 INJECTION, SOLUTION INTRAVENOUS at 23:04

## 2024-06-15 RX ADMIN — METRONIDAZOLE 500 MG: 500 INJECTION, SOLUTION INTRAVENOUS at 16:21

## 2024-06-15 RX ADMIN — SODIUM BICARBONATE 150 MEQ: 84 INJECTION, SOLUTION INTRAVENOUS at 23:36

## 2024-06-15 RX ADMIN — PROPOFOL 5 MCG/KG/MIN: 10 INJECTION, EMULSION INTRAVENOUS at 15:42

## 2024-06-15 RX ADMIN — SODIUM CHLORIDE 5 MCG/MIN: 9 INJECTION, SOLUTION INTRAVENOUS at 15:42

## 2024-06-15 RX ADMIN — SODIUM CHLORIDE, PRESERVATIVE FREE 10 ML: 5 INJECTION INTRAVENOUS at 08:34

## 2024-06-15 RX ADMIN — HYDROCORTISONE SODIUM SUCCINATE 50 MG: 100 INJECTION, POWDER, FOR SOLUTION INTRAMUSCULAR; INTRAVENOUS at 23:17

## 2024-06-15 RX ADMIN — VASOPRESSIN 0.04 UNITS/MIN: 20 INJECTION INTRAVENOUS at 02:03

## 2024-06-15 RX ADMIN — SODIUM CHLORIDE, POTASSIUM CHLORIDE, SODIUM LACTATE AND CALCIUM CHLORIDE: 600; 310; 30; 20 INJECTION, SOLUTION INTRAVENOUS at 06:01

## 2024-06-15 RX ADMIN — SODIUM CHLORIDE: 9 INJECTION, SOLUTION INTRAVENOUS at 11:38

## 2024-06-15 RX ADMIN — VANCOMYCIN HYDROCHLORIDE 1000 MG: 1 INJECTION, POWDER, LYOPHILIZED, FOR SOLUTION INTRAVENOUS at 12:44

## 2024-06-15 RX ADMIN — VASOPRESSIN 0.04 UNITS/MIN: 20 INJECTION INTRAVENOUS at 17:57

## 2024-06-15 RX ADMIN — SODIUM BICARBONATE: 84 INJECTION, SOLUTION INTRAVENOUS at 23:48

## 2024-06-15 RX ADMIN — SODIUM CHLORIDE, PRESERVATIVE FREE 10 ML: 5 INJECTION INTRAVENOUS at 20:24

## 2024-06-15 RX ADMIN — SODIUM CHLORIDE, POTASSIUM CHLORIDE, SODIUM LACTATE AND CALCIUM CHLORIDE 500 ML: 600; 310; 30; 20 INJECTION, SOLUTION INTRAVENOUS at 17:15

## 2024-06-15 RX ADMIN — CEFEPIME 2000 MG: 2 INJECTION, POWDER, FOR SOLUTION INTRAVENOUS at 11:38

## 2024-06-15 RX ADMIN — CEFEPIME 2000 MG: 2 INJECTION, POWDER, FOR SOLUTION INTRAVENOUS at 04:02

## 2024-06-15 RX ADMIN — MAGNESIUM SULFATE HEPTAHYDRATE 2000 MG: 40 INJECTION, SOLUTION INTRAVENOUS at 11:39

## 2024-06-15 RX ADMIN — METRONIDAZOLE 500 MG: 500 INJECTION, SOLUTION INTRAVENOUS at 00:33

## 2024-06-15 RX ADMIN — Medication 50 MCG/HR: at 15:43

## 2024-06-15 RX ADMIN — Medication 75 MCG/HR: at 02:06

## 2024-06-15 RX ADMIN — CIPROFLOXACIN 400 MG: 400 INJECTION, SOLUTION INTRAVENOUS at 00:36

## 2024-06-15 RX ADMIN — PANTOPRAZOLE SODIUM 40 MG: 40 INJECTION, POWDER, FOR SOLUTION INTRAVENOUS at 08:22

## 2024-06-15 RX ADMIN — HYDROCORTISONE SODIUM SUCCINATE 50 MG: 100 INJECTION, POWDER, FOR SOLUTION INTRAMUSCULAR; INTRAVENOUS at 17:07

## 2024-06-15 RX ADMIN — SODIUM CHLORIDE, POTASSIUM CHLORIDE, SODIUM LACTATE AND CALCIUM CHLORIDE 500 ML: 600; 310; 30; 20 INJECTION, SOLUTION INTRAVENOUS at 16:43

## 2024-06-15 RX ADMIN — PANTOPRAZOLE SODIUM 40 MG: 40 INJECTION, POWDER, FOR SOLUTION INTRAVENOUS at 20:23

## 2024-06-15 RX ADMIN — METRONIDAZOLE 500 MG: 500 INJECTION, SOLUTION INTRAVENOUS at 08:22

## 2024-06-15 ASSESSMENT — PAIN SCALES - GENERAL
PAINLEVEL_OUTOF10: 0

## 2024-06-15 ASSESSMENT — PULMONARY FUNCTION TESTS
PIF_VALUE: 16
PIF_VALUE: 17
PIF_VALUE: 18
PIF_VALUE: 22
PIF_VALUE: 16
PIF_VALUE: 11
PIF_VALUE: 15

## 2024-06-15 NOTE — PROGRESS NOTES
[facility]  Matthew Eastman M.D.  [phone]           GI PROGRESS NOTE        NAME:             Sujata Ernandez   :  1945   MRN:  [id number]       Subjective:   Remains intubated and sedated in the ICU.   Hgb has been stable in the last 12 or so hours.      Objective:     VITALS:   Last 24hrs VS reviewed since prior progress note. Most recent are:  Vitals:    06/15/24 1330   BP: 111/65   Pulse: (!) 105   Resp: 19   Temp:    SpO2: 98%       Intake/Output Summary (Last 24 hours) at 6/15/2024 1548  Last data filed at 6/15/2024 1343  Gross per 24 hour   Intake 7144.72 ml   Output 800 ml   Net 6344.72 ml       PHYSICAL EXAM:  General: Obese elderly woman, sedated and intubated, unresponsive  HEENT: ETT in place  Lungs:           CTAB, normal work of breathing, intubated and on ventilator  Heart:  RRR  Abdomen: Soft and obese, Non distended, Non tender.  No rebound or guarding. No ascites fluid wave.  Extremities: Diffuse ecchymosis of both upper extremities to the hands  Neurologic:  Sedated and unresponsive  Psych:   sedate    Lab Data Reviewed:   Recent Labs     06/15/24  0552 06/15/24  1233   WBC 16.1* 15.3*   HGB 8.8* 8.9*   HCT 27.8* 27.4*   * 135*     Recent Labs     24  0848 24  1355 06/15/24  0459   NA  --  142 143   K  --  5.6* 4.1   CL  --  116* 119*   CO2  --  23 18*   BUN  --  21* 21*   PHOS 3.8  --  2.9     Recent Labs     24  0511 06/15/24  0459   GLOB 2.6 2.5       ________________________________________________________________________  Patient Active Problem List   Diagnosis    Coronary artery disease involving native coronary artery of native heart    Idiopathic peripheral neuropathy    History of aortic valve replacement with tissue graft    Psoriasis    HTN (hypertension), benign    Chronic colitis    Hx of completed stroke    Confusion    Infrarenal abdominal aortic aneurysm (AAA) without rupture (HCC)    Obese

## 2024-06-15 NOTE — PROGRESS NOTES
Rt attempted to obtain ABG sample without success. RT spoke with bedside RN about getting VBG instead.

## 2024-06-15 NOTE — PROGRESS NOTES
1615- This RN at bedside d/t decrese in BP. Difficulty obtaining pulse ox reading at this time - alt methods attempted. Vent setting stable, CBC and VBG drawn at this time.   1625- Dr Saleh at bedside, levophed titrated to verbal order.  1705- Dr Saleh at bedside again, stat lactic drawn, levophed titrated per verbal order. Verbal order to restart vasopressin and admin second 500mL bolus. Restart LR @ 125 if BP drops again after bolus is complete. No other orders at this time   1745- Lactic 5.3, Dr saleh aware.

## 2024-06-15 NOTE — CONSULTS
[facility]  Cardiology Care Note                  [x]Initial visit     []Established visit     Kyree Merchant MD, [facility]    [address], [address], [place] [address]  Phone [phone]; Fax [phone]  Mobile [phone]   Voice Mail [phone]      Patient Name: Sujata Ernandez - :1945 - MRN:[id number]  Primary Cardiologist: none  Consulting Cardiologist: Kyree Merchant MD       2024  6:59 PM    Reason for initial visit:for hypotension     HPI:       This patient has been seen and evaluated at the request of Dr. Teran for hypotension.  Patient is a 79 y.o. female with PMHx of PAD who presenting with hypotension and acute GI bleed on . IR embolized branch of SMA. Pt hemodynamically stable until this afternoon when she became more somnolent and dropped her BP acutely.    Echo reveals normal EF and there is a prosthetic AV with reasonable D.I. and MV appears visually mildly stenotic. She is also noted to have a small PE.      She is intubated and pressor requirement is down.    Assessment  and  Plan     1)Hypotension w/ hx of hypertension  -echo EF is normal  2)CAD  -plavix and aspirin on hold secondary to GI bleed  3)Aortic stenosis with AVR  -AV appears to be functioning normally.  -coming down on pressors  4)anemia  5)AAA   6)Obese  7)GI bleed  -embolization on   8) pulmonary embolus described as small  9) Pulmonary embolus           ____________________________________________________________    Cardiac work up to date:  24    ECHO (TTE) COMPLETE (PRN CONTRAST/BUBBLE/STRAIN/3D) 06/15/2024 12:37 PM (Final)    Interpretation Summary    Left Ventricle: Normal left ventricular systolic function with a visually estimated EF of 60 - 65%. Left ventricle size is normal. LVIDd is 2.6 cm. Increased wall thickness. IVSd is 1.3 cm. LVPWd is 1.0 cm. Normal wall motion. Normal diastolic

## 2024-06-15 NOTE — PROGRESS NOTES
Called daughter Valarie and let her know that we have had to go up on pressors acutely in the last 30 min. Let her know that Oxygenation, HR and hgb stable. She verbalized understanding. Pt given 1 liter fluid bolus, hydrocortisone added. Hgb stable. VBG showing normal pH. Levophed currently at 20 (from 4 mcg/min) and BP stabilizing.

## 2024-06-15 NOTE — PROCEDURES
PROCEDURE NOTE  Date: 6/14/2024   Name: Sujata Ernandez  YOB: 1945    Intubation    Date/Time: 6/14/2024 8:33 PM    Performed by: Ligia Saleh MD  Authorized by: Ligia Saleh MD  Consent: Verbal consent obtained.  Consent given by: power of   Patient understanding: patient states understanding of the procedure being performed  Indications: respiratory distress  Intubation method: video-assisted  Patient status: paralyzed (RSI)  Preoxygenation: BVM  Sedatives: etomidate  Paralytic: succinylcholine  Laryngoscope size: Mac 3  Tube size: 7.5 mm  Tube type: cuffed  Cords visualized: yes  Post-procedure assessment: chest rise and ETCO2 monitor  Breath sounds: equal  Cuff inflated: yes  ETT to lip: 23 cm  Tube secured with: ETT eli  Chest x-ray interpreted by radiologist.  Chest x-ray findings: endotracheal tube in appropriate position

## 2024-06-15 NOTE — PROCEDURES
PROCEDURE NOTE  Date: 6/14/2024   Name: Sujata Ernandez  YOB: 1945    CENTRAL LINE    Date/Time: 6/14/2024 8:29 PM    Performed by: Ligia Saleh MD  Authorized by: Ligia Saleh MD  Consent: Verbal consent obtained.  Consent given by: power of   Patient understanding: patient states understanding of the procedure being performed  Patient identity confirmed: arm band  Time out: Immediately prior to procedure a \"time out\" was called to verify the correct patient, procedure, equipment, support staff and site/side marked as required.  Indications: vascular access    Anesthesia:  Local Anesthetic: lidocaine 1% without epinephrine    Sedation:  Patient sedated: yes  Analgesia: fentanyl    Preparation: skin prepped with 2% chlorhexidine  Skin prep agent dried: skin prep agent completely dried prior to procedure  Hand hygiene: hand hygiene performed prior to central venous catheter insertion  Location details: right internal jugular  Patient position: Trendelenburg  Catheter type: triple lumen  Ultrasound guidance: yes  Number of attempts: 1  Successful placement: yes  Post-procedure: line sutured and dressing applied  Assessment: blood return through all ports and placement verified by x-ray

## 2024-06-15 NOTE — PLAN OF CARE
Problem: Safety - Medical Restraint  Goal: Remains free of injury from restraints (Restraint for Interference with Medical Device)  Description: INTERVENTIONS:  1. Determine that other, less restrictive measures have been tried or would not be effective before applying the restraint  2. Evaluate the patient's condition at the time of restraint application  3. Inform patient/family regarding the reason for restraint  4. Q2H: Monitor safety, psychosocial status, comfort, nutrition and hydration  Outcome: Not Progressing  Flowsheets  Taken 6/15/2024 1733  Remains free of injury from restraints (restraint for interference with medical device):   Determine that other, less restrictive measures have been tried or would not be effective before applying the restraint   Inform patient/family regarding the reason for restraint   Evaluate the patient's condition at the time of restraint application   Every 2 hours: Monitor safety, psychosocial status, comfort, nutrition and hydration  Taken 6/15/2024 0800  Remains free of injury from restraints (restraint for interference with medical device):   Determine that other, less restrictive measures have been tried or would not be effective before applying the restraint   Evaluate the patient's condition at the time of restraint application   Inform patient/family regarding the reason for restraint   Every 2 hours: Monitor safety, psychosocial status, comfort, nutrition and hydration

## 2024-06-15 NOTE — PLAN OF CARE
Problem: Discharge Planning  Goal: Discharge to home or other facility with appropriate resources  Outcome: Progressing  Flowsheets (Taken 6/14/2024 2000)  Discharge to home or other facility with appropriate resources: Identify barriers to discharge with patient and caregiver     Problem: Skin/Tissue Integrity  Goal: Absence of new skin breakdown  Description: 1.  Monitor for areas of redness and/or skin breakdown  2.  Assess vascular access sites hourly  3.  Every 4-6 hours minimum:  Change oxygen saturation probe site  4.  Every 4-6 hours:  If on nasal continuous positive airway pressure, respiratory therapy assess nares and determine need for appliance change or resting period.  Outcome: Progressing     Problem: ABCDS Injury Assessment  Goal: Absence of physical injury  Outcome: Progressing     Problem: Safety - Adult  Goal: Free from fall injury  Outcome: Progressing     Problem: Chronic Conditions and Co-morbidities  Goal: Patient's chronic conditions and co-morbidity symptoms are monitored and maintained or improved  Outcome: Progressing  Flowsheets (Taken 6/14/2024 2000)  Care Plan - Patient's Chronic Conditions and Co-Morbidity Symptoms are Monitored and Maintained or Improved: Monitor and assess patient's chronic conditions and comorbid symptoms for stability, deterioration, or improvement     Problem: Respiratory - Adult  Goal: Achieves optimal ventilation and oxygenation  6/15/2024 0044 by Elizabeth Castellano, RN  Outcome: Progressing  6/14/2024 1930 by Esperanza Alvarado, RT  Outcome: Progressing     Problem: Safety - Medical Restraint  Goal: Remains free of injury from restraints (Restraint for Interference with Medical Device)  Description: INTERVENTIONS:  1. Determine that other, less restrictive measures have been tried or would not be effective before applying the restraint  2. Evaluate the patient's condition at the time of restraint application  3. Inform patient/family regarding the reason for  restraint  4. Q2H: Monitor safety, psychosocial status, comfort, nutrition and hydration  Outcome: Progressing  Flowsheets (Taken 6/14/2024 2025)  Remains free of injury from restraints (restraint for interference with medical device): Determine that other, less restrictive measures have been tried or would not be effective before applying the restraint

## 2024-06-15 NOTE — CONSULTS
Name: Sujata Ernandez MRN: [id number]   : 1945 Hospital: [facility]   Date: 6/15/2024        Impression Plan     Hypotension- septic shock  UTI  PE   SMA bleed/transverse colon s/p coiled  Encephalopathy  Hyperkalemia- resolved  Hypomagnesemia  Leukocytosis                 Full vent support.   Continue propofol/fentanyl for goal RASS of -1  Decompensation due to UTI and sepsis? Nuclear scan for bleed negative. Troponin negative, lactic acid negative- cardiac event and bowel ischemia low likelihood.   Continue Levophed with goal MAP>65. Vasopressin off  Decrease IV fluid  Serial CBCs  Continue cefepime/Vanc  Discontinued ciproflox  Follow up urine cultures  Echo done- EF looks preserved. Waiting for official read  Will need an IVC filter since not a candidate for anticoagulation. Will asked to have it placed Monday is hemodynamics stable at the time. LE doppler with no evidence of DVTs and therefore not at immediate risk for further clot burden  SCDs. Heparin proph held due to GI bleed  NPO  DNR       Pt is critically ill. Critical care time spent with pt exclusive of procedures was 42 minutes    Radiology  ( personally reviewed) CT abdomen results reviewed   ABG Invalid input(s): \"PHI\", \"PO2I\", \"PCO2I\"       Subjective     This patient has been seen and evaluated at the request of Dr. Teran for hypotension.  Patient is a 79 y.o. female with PMHx of PAD who presenting with hypotension and acute GI bleed on . IR embolized branch of SMA. Pt hemodynamically stable until this afternoon when she became more somnolent and dropped her BP acutely.     Overnight events:   Overnight was up to levophed 50 mcg/min, vasopressin 0.04- vasopressin off this morning and levophed down  RASS -3  Prop 5, Fentanyl 75  WBC trending down  UA positive for UTI  6843/650 + 6193 cc      Past Medical History:   Diagnosis Date    Aortic stenosis     Cerebral atrophy (HCC)     Cerebral microvascular

## 2024-06-15 NOTE — PROGRESS NOTES
[facility]    Hospitalist Progress Note    NAME: Sujata Ernandez   :  1945  MRM:  [id number]    Date/Time of service 6/15/2024  8:05 AM    To assist coordination of care and communication with nursing and staff, this note may be preliminary early in the day, but finalized by end of the day.        Assessment and Plan:     Acute GI bleeding / Gastrointestinal hemorrhage / Chronic colitis - Followed by GI, has been on budesonide, colestipol and pancreatic enzymes.  ER consulted IR who found active hemorrhage in the proximal transverse colon, and did an embolization on .  I fear there will be resulting consequences of ischemia. Continue probiotic.  Empirically on cefepime, cipro and flagyl.     Anemia / Thrombocytopenia - POA and worse than baseline due to acute bleeding, and will worsen with hydration. Check serologies.    Shock / Hypotension - POA, DDX hemorrhagic, septic, hypovolemic. Blood loss, UTI, dehydration and polypharmacy may contribute.  Hydrated but worsened.  Now on levophed. Weaned of vasopressin    Acute hypoxic respiratory failure - Worsened due to shock.  Intubated yesterday.  Vent management per intensivist. Sedation with fentanyl and propofol    UTI - POA suggested by UA.  Continue cefepime    Acute Pulmonary embolism - Noted on scan, but small and unlikely contributing much to hypoxia or shock.       Acute metabolic encephalopathy / Confusion / Hx of completed stroke / Cerebral microvascular disease / Cerebral atrophy - Likely has severe vascular dementia, exacerbated by anemia, shock, polypharmacy. Last visit she required sedation in hospital for . Continue seroquel     Hyperkalemia - Give NS as IVF and follow    Chronic back pain / Idiopathic peripheral neuropathy / Polypharmacy - POA.  PT eval.  Prn lidoderm. Consider resuming ultram and gabapentin if more alert. Hold oxycodone due to shock     Coronary artery disease / Hypertension - BP  rhythm  __________________________________________________________________  Medications Reviewed: (see below)  Medications:     Current Facility-Administered Medications   Medication Dose Route Frequency    oxyCODONE (ROXICODONE) immediate release tablet 5 mg  5 mg Oral Q4H PRN    QUEtiapine (SEROQUEL) tablet 25 mg  25 mg Oral Nightly PRN    lidocaine 4 % external patch 1 patch  1 patch TransDERmal Daily    HYDROmorphone (DILAUDID) injection 0.25 mg  0.25 mg IntraVENous Q4H PRN    [Held by provider] atorvastatin (LIPITOR) tablet 80 mg  80 mg Oral Nightly    [Held by provider] methenamine (HIPREX) tablet 1 g  1 g Oral BID WC    pantoprazole (PROTONIX) 40 mg in sodium chloride (PF) 0.9 % 10 mL injection  40 mg IntraVENous Q12H    ciprofloxacin (CIPRO) IVPB 400 mg  400 mg IntraVENous Q12H    metroNIDAZOLE (FLAGYL) 500 mg in 0.9% NaCl 100 mL IVPB premix  500 mg IntraVENous Q8H    norepinephrine (LEVOPHED) 16 mg in sodium chloride 0.9 % 250 mL infusion  1-100 mcg/min IntraVENous Continuous    gabapentin (NEURONTIN) capsule 100 mg  100 mg Oral TID    fentaNYL (SUBLIMAZE) infusion 1000 mcg/100mL   mcg/hr IntraVENous Continuous    midazolam PF (VERSED) injection 2 mg  2 mg IntraVENous PRN    propofol infusion  5-50 mcg/kg/min IntraVENous Continuous    VASOpressin 20 Units in sodium chloride 0.9 % 100 mL infusion  0.04 Units/min IntraVENous Continuous    lactated ringers IV soln infusion   IntraVENous Continuous    ceFEPIme (MAXIPIME) 2,000 mg in sodium chloride 0.9 % 100 mL IVPB (mini-bag)  2,000 mg IntraVENous Q8H    heparin (PF) 100 UNIT/ML injection 100 Units  100 Units IntraCATHeter Once    sodium chloride flush 0.9 % injection 5-40 mL  5-40 mL IntraVENous 2 times per day    sodium chloride flush 0.9 % injection 5-40 mL  5-40 mL IntraVENous PRN    0.9 % sodium chloride infusion   IntraVENous PRN    polyethylene glycol (GLYCOLAX) packet 17 g  17 g Oral Daily PRN    acetaminophen (TYLENOL) tablet 650 mg  650 mg Oral

## 2024-06-16 ENCOUNTER — APPOINTMENT (OUTPATIENT)
Facility: HOSPITAL | Age: 79
DRG: 853 | End: 2024-06-16
Payer: MEDICARE

## 2024-06-16 VITALS
BODY MASS INDEX: 32.2 KG/M2 | OXYGEN SATURATION: 95 % | DIASTOLIC BLOOD PRESSURE: 45 MMHG | HEIGHT: 62 IN | WEIGHT: 175 LBS | TEMPERATURE: 98.1 F | HEART RATE: 109 BPM | SYSTOLIC BLOOD PRESSURE: 66 MMHG | RESPIRATION RATE: 16 BRPM

## 2024-06-16 LAB
ANION GAP SERPL CALC-SCNC: 14 MMOL/L (ref 5–15)
ARTERIAL PATENCY WRIST A: ABNORMAL
BACTERIA SPEC CULT: NORMAL
BACTERIA SPEC CULT: NORMAL
BASE DEFICIT BLDV-SCNC: 21.2 MMOL/L
BDY SITE: ABNORMAL
BUN SERPL-MCNC: 34 MG/DL (ref 6–20)
BUN/CREAT SERPL: 29 (ref 12–20)
CALCIUM SERPL-MCNC: 6.9 MG/DL (ref 8.5–10.1)
CHLORIDE SERPL-SCNC: 119 MMOL/L (ref 97–108)
CO2 SERPL-SCNC: 11 MMOL/L (ref 21–32)
CREAT SERPL-MCNC: 1.18 MG/DL (ref 0.55–1.02)
ERYTHROCYTE [DISTWIDTH] IN BLOOD BY AUTOMATED COUNT: 22.1 % (ref 11.5–14.5)
GAS FLOW.O2 O2 DELIVERY SYS: ABNORMAL
GAS FLOW.O2 SETTING OXYMISER: 16 BPM
GLUCOSE SERPL-MCNC: 138 MG/DL (ref 65–100)
HCO3 BLDV-SCNC: 7.2 MMOL/L (ref 23–28)
HCT VFR BLD AUTO: 25.3 % (ref 35–47)
HGB BLD-MCNC: 7.7 G/DL (ref 11.5–16)
LACTATE SERPL-SCNC: 12.3 MMOL/L (ref 0.4–2)
MCH RBC QN AUTO: 28.5 PG (ref 26–34)
MCHC RBC AUTO-ENTMCNC: 30.4 G/DL (ref 30–36.5)
MCV RBC AUTO: 93.7 FL (ref 80–99)
NRBC # BLD: 0.34 K/UL (ref 0–0.01)
NRBC BLD-RTO: 3.1 PER 100 WBC
O2/TOTAL GAS SETTING VFR VENT: 90 %
PCO2 BLDV: 25.8 MMHG (ref 41–51)
PEEP RESPIRATORY: 5 CMH2O
PH BLDV: 7.06 (ref 7.32–7.42)
PLATELET # BLD AUTO: 119 K/UL (ref 150–400)
PMV BLD AUTO: 10.7 FL (ref 8.9–12.9)
PO2 BLDV: 70 MMHG (ref 25–40)
POTASSIUM SERPL-SCNC: 6.8 MMOL/L (ref 3.5–5.1)
RBC # BLD AUTO: 2.7 M/UL (ref 3.8–5.2)
SAO2 % BLDV: 85.4 % (ref 65–88)
SERVICE CMNT-IMP: ABNORMAL
SERVICE CMNT-IMP: ABNORMAL
SERVICE CMNT-IMP: NORMAL
SODIUM SERPL-SCNC: 144 MMOL/L (ref 136–145)
SPECIMEN TYPE: ABNORMAL
VENTILATION MODE VENT: ABNORMAL
VT SETTING VENT: 375 ML
WBC # BLD AUTO: 10.9 K/UL (ref 3.6–11)

## 2024-06-16 PROCEDURE — 2580000003 HC RX 258: Performed by: INTERNAL MEDICINE

## 2024-06-16 PROCEDURE — 85027 COMPLETE CBC AUTOMATED: CPT

## 2024-06-16 PROCEDURE — 94003 VENT MGMT INPAT SUBQ DAY: CPT

## 2024-06-16 PROCEDURE — 6360000002 HC RX W HCPCS: Performed by: EMERGENCY MEDICINE

## 2024-06-16 PROCEDURE — 6360000002 HC RX W HCPCS: Performed by: INTERNAL MEDICINE

## 2024-06-16 PROCEDURE — 6360000002 HC RX W HCPCS

## 2024-06-16 PROCEDURE — 2700000000 HC OXYGEN THERAPY PER DAY

## 2024-06-16 PROCEDURE — 82803 BLOOD GASES ANY COMBINATION: CPT

## 2024-06-16 PROCEDURE — 71045 X-RAY EXAM CHEST 1 VIEW: CPT

## 2024-06-16 PROCEDURE — C9113 INJ PANTOPRAZOLE SODIUM, VIA: HCPCS | Performed by: INTERNAL MEDICINE

## 2024-06-16 PROCEDURE — 36415 COLL VENOUS BLD VENIPUNCTURE: CPT

## 2024-06-16 PROCEDURE — 83605 ASSAY OF LACTIC ACID: CPT

## 2024-06-16 PROCEDURE — 2580000003 HC RX 258: Performed by: EMERGENCY MEDICINE

## 2024-06-16 PROCEDURE — 94761 N-INVAS EAR/PLS OXIMETRY MLT: CPT

## 2024-06-16 PROCEDURE — 2500000003 HC RX 250 WO HCPCS: Performed by: EMERGENCY MEDICINE

## 2024-06-16 PROCEDURE — 80048 BASIC METABOLIC PNL TOTAL CA: CPT

## 2024-06-16 PROCEDURE — 2500000003 HC RX 250 WO HCPCS: Performed by: INTERNAL MEDICINE

## 2024-06-16 RX ORDER — MIDAZOLAM HYDROCHLORIDE 2 MG/2ML
1 INJECTION, SOLUTION INTRAMUSCULAR; INTRAVENOUS EVERY 5 MIN PRN
Status: DISCONTINUED | OUTPATIENT
Start: 2024-06-16 | End: 2024-06-16 | Stop reason: HOSPADM

## 2024-06-16 RX ORDER — CALCIUM GLUCONATE 20 MG/ML
1000 INJECTION, SOLUTION INTRAVENOUS ONCE
Status: COMPLETED | OUTPATIENT
Start: 2024-06-16 | End: 2024-06-16

## 2024-06-16 RX ORDER — HYDROMORPHONE HYDROCHLORIDE 1 MG/ML
1 INJECTION, SOLUTION INTRAMUSCULAR; INTRAVENOUS; SUBCUTANEOUS EVERY 5 MIN PRN
Status: DISCONTINUED | OUTPATIENT
Start: 2024-06-16 | End: 2024-06-16 | Stop reason: HOSPADM

## 2024-06-16 RX ORDER — SODIUM CHLORIDE, SODIUM LACTATE, POTASSIUM CHLORIDE, AND CALCIUM CHLORIDE .6; .31; .03; .02 G/100ML; G/100ML; G/100ML; G/100ML
500 INJECTION, SOLUTION INTRAVENOUS ONCE
Status: COMPLETED | OUTPATIENT
Start: 2024-06-16 | End: 2024-06-16

## 2024-06-16 RX ADMIN — HYDROCORTISONE SODIUM SUCCINATE 50 MG: 100 INJECTION, POWDER, FOR SOLUTION INTRAMUSCULAR; INTRAVENOUS at 04:54

## 2024-06-16 RX ADMIN — SODIUM CHLORIDE 100 MCG/MIN: 9 INJECTION, SOLUTION INTRAVENOUS at 11:41

## 2024-06-16 RX ADMIN — SODIUM CHLORIDE 50 MCG/MIN: 9 INJECTION, SOLUTION INTRAVENOUS at 02:25

## 2024-06-16 RX ADMIN — METRONIDAZOLE 500 MG: 500 INJECTION, SOLUTION INTRAVENOUS at 00:09

## 2024-06-16 RX ADMIN — VASOPRESSIN 0.04 UNITS/MIN: 20 INJECTION INTRAVENOUS at 00:48

## 2024-06-16 RX ADMIN — SODIUM BICARBONATE 150 MEQ: 84 INJECTION, SOLUTION INTRAVENOUS at 04:53

## 2024-06-16 RX ADMIN — Medication 50 MCG/HR: at 11:44

## 2024-06-16 RX ADMIN — CEFEPIME 2000 MG: 2 INJECTION, POWDER, FOR SOLUTION INTRAVENOUS at 03:44

## 2024-06-16 RX ADMIN — CALCIUM GLUCONATE 1000 MG: 20 INJECTION, SOLUTION INTRAVENOUS at 05:01

## 2024-06-16 RX ADMIN — SODIUM CHLORIDE, POTASSIUM CHLORIDE, SODIUM LACTATE AND CALCIUM CHLORIDE 500 ML: 600; 310; 30; 20 INJECTION, SOLUTION INTRAVENOUS at 03:53

## 2024-06-16 RX ADMIN — PANTOPRAZOLE SODIUM 40 MG: 40 INJECTION, POWDER, FOR SOLUTION INTRAVENOUS at 08:05

## 2024-06-16 RX ADMIN — MIDAZOLAM 1 MG: 1 INJECTION INTRAMUSCULAR; INTRAVENOUS at 14:36

## 2024-06-16 RX ADMIN — VANCOMYCIN HYDROCHLORIDE 1000 MG: 1 INJECTION, POWDER, LYOPHILIZED, FOR SOLUTION INTRAVENOUS at 00:43

## 2024-06-16 RX ADMIN — EPINEPHRINE 5 MCG/MIN: 1 INJECTION INTRAMUSCULAR; INTRAVENOUS; SUBCUTANEOUS at 04:18

## 2024-06-16 RX ADMIN — METRONIDAZOLE 500 MG: 500 INJECTION, SOLUTION INTRAVENOUS at 08:05

## 2024-06-16 RX ADMIN — SODIUM CHLORIDE 100 MCG/MIN: 9 INJECTION, SOLUTION INTRAVENOUS at 06:24

## 2024-06-16 ASSESSMENT — PAIN SCALES - GENERAL
PAINLEVEL_OUTOF10: 0

## 2024-06-16 ASSESSMENT — PULMONARY FUNCTION TESTS
PIF_VALUE: 16
PIF_VALUE: 20
PIF_VALUE: 18

## 2024-06-16 NOTE — PLAN OF CARE
Problem: Discharge Planning  Goal: Discharge to home or other facility with appropriate resources  Outcome: Completed  Flowsheets (Taken 6/16/2024 0800)  Discharge to home or other facility with appropriate resources:   Identify barriers to discharge with patient and caregiver   Arrange for needed discharge resources and transportation as appropriate   Identify discharge learning needs (meds, wound care, etc)   Refer to discharge planning if patient needs post-hospital services based on physician order or complex needs related to functional status, cognitive ability or social support system     Problem: Pain  Goal: Verbalizes/displays adequate comfort level or baseline comfort level  Outcome: Completed  Flowsheets (Taken 6/16/2024 0800)  Verbalizes/displays adequate comfort level or baseline comfort level:   Encourage patient to monitor pain and request assistance   Assess pain using appropriate pain scale   Administer analgesics based on type and severity of pain and evaluate response   Implement non-pharmacological measures as appropriate and evaluate response   Consider cultural and social influences on pain and pain management   Notify Licensed Independent Practitioner if interventions unsuccessful or patient reports new pain     Problem: Skin/Tissue Integrity  Goal: Absence of new skin breakdown  Description: 1.  Monitor for areas of redness and/or skin breakdown  2.  Assess vascular access sites hourly  3.  Every 4-6 hours minimum:  Change oxygen saturation probe site  4.  Every 4-6 hours:  If on nasal continuous positive airway pressure, respiratory therapy assess nares and determine need for appliance change or resting period.  Outcome: Completed     Problem: ABCDS Injury Assessment  Goal: Absence of physical injury  Outcome: Completed  Flowsheets (Taken 6/16/2024 0800)  Absence of Physical Injury: Implement safety measures based on patient assessment     Problem: Safety - Adult  Goal: Free from fall  (Restraint for Interference with Medical Device)  Description: INTERVENTIONS:  1. Determine that other, less restrictive measures have been tried or would not be effective before applying the restraint  2. Evaluate the patient's condition at the time of restraint application  3. Inform patient/family regarding the reason for restraint  4. Q2H: Monitor safety, psychosocial status, comfort, nutrition and hydration  Outcome: Completed  Flowsheets  Taken 6/16/2024 0800 by Yudy Rendon RN  Remains free of injury from restraints (restraint for interference with medical device):   Determine that other, less restrictive measures have been tried or would not be effective before applying the restraint   Evaluate the patient's condition at the time of restraint application   Inform patient/family regarding the reason for restraint   Every 2 hours: Monitor safety, psychosocial status, comfort, nutrition and hydration  Taken 6/16/2024 0400 by Chula Rodriguez RN  Remains free of injury from restraints (restraint for interference with medical device): Every 2 hours: Monitor safety, psychosocial status, comfort, nutrition and hydration

## 2024-06-16 NOTE — PROGRESS NOTES
ABG ordered. RT unable to obtain sample due to low BP and poor perfusion. VBG obtained, bedsidenurse notified

## 2024-06-16 NOTE — PROGRESS NOTES
[facility]    Hospitalist Progress Note    NAME: Sujata Ernandez   :  1945  MRM:  [id number]    Date/Time of service 2024  8:19 AM    To assist coordination of care and communication with nursing and staff, this note may be preliminary early in the day, but finalized by end of the day.        Assessment and Plan:     Acute GI bleeding / Gastrointestinal hemorrhage / Chronic colitis - Followed by GI, has been on budesonide, colestipol and pancreatic enzymes.  ER consulted IR who found active hemorrhage in the proximal transverse colon, and did an embolization on .  I fear there will be resulting consequences of ischemia. Lactic acid up to >12 despite all measures.  Continue probiotic.  Empirically on cefepime, cipro and flagyl.     Anemia / Thrombocytopenia - POA and worse than baseline due to acute bleeding, and will worsen with hydration. I now suspect DIC.    Acute renal failure / Hyperkalemia / Acute metabolic acidosis - Give Bicarb and NS as IVF and follow    Shock / Hypotension - POA, DDX hemorrhagic, septic, hypovolemic. Blood loss, UTI, dehydration and polypharmacy may contribute.  Hydrated but worsened.  Now on solucortef, levophed and epinephrine drip. Weaned of vasopressin    Acute hypoxic respiratory failure - Worsened due to shock.  Intubated .  Vent management per intensivist. Sedation with fentanyl and propofol. Doing poorly.    Shock liver - Due to above.  LFTS sharply up    UTI - POA suggested by UA.  Continue cefepime    Acute Pulmonary embolism - Noted on scan, but small and unlikely contributing much to hypoxia or shock.       Acute metabolic encephalopathy / Confusion / Hx of completed stroke / Cerebral microvascular disease / Cerebral atrophy - Likely has severe vascular dementia, exacerbated by anemia, shock, polypharmacy. Last visit she required sedation in hospital for . Continue seroquel     Chronic back pain / Idiopathic  intact, general motor weakness, does not follows commands   Psych:  sedated    Telemetry reviewed:   normal sinus rhythm  __________________________________________________________________  Medications Reviewed: (see below)  Medications:     Current Facility-Administered Medications   Medication Dose Route Frequency    EPINEPHrine 10,000 mcg in sodium chloride 0.9 % 250 mL infusion  1-20 mcg/min IntraVENous Continuous    norepinephrine (LEVOPHED) 32 mg in  mL infusion  1-100 mcg/min IntraVENous Continuous    vancomycin (VANCOCIN) 1,000 mg in sodium chloride 0.9 % 250 mL IVPB ([id number])  1,000 mg IntraVENous Q12H    hydrocortisone sodium succinate PF (SOLU-CORTEF) injection 50 mg  50 mg IntraVENous Q6H    sodium bicarbonate 150 mEq in dextrose 5 % 1,000 mL infusion   IntraVENous Continuous    oxyCODONE (ROXICODONE) immediate release tablet 5 mg  5 mg Oral Q4H PRN    QUEtiapine (SEROQUEL) tablet 25 mg  25 mg Oral Nightly PRN    lidocaine 4 % external patch 1 patch  1 patch TransDERmal Daily    HYDROmorphone (DILAUDID) injection 0.25 mg  0.25 mg IntraVENous Q4H PRN    [Held by provider] atorvastatin (LIPITOR) tablet 80 mg  80 mg Oral Nightly    [Held by provider] methenamine (HIPREX) tablet 1 g  1 g Oral BID WC    pantoprazole (PROTONIX) 40 mg in sodium chloride (PF) 0.9 % 10 mL injection  40 mg IntraVENous Q12H    metroNIDAZOLE (FLAGYL) 500 mg in 0.9% NaCl 100 mL IVPB premix  500 mg IntraVENous Q8H    gabapentin (NEURONTIN) capsule 100 mg  100 mg Oral TID    fentaNYL (SUBLIMAZE) infusion 1000 mcg/100mL   mcg/hr IntraVENous Continuous    midazolam PF (VERSED) injection 2 mg  2 mg IntraVENous PRN    propofol infusion  5-50 mcg/kg/min IntraVENous Continuous    VASOpressin 20 Units in sodium chloride 0.9 % 100 mL infusion  0.04 Units/min IntraVENous Continuous    ceFEPIme (MAXIPIME) 2,000 mg in sodium chloride 0.9 % 100 mL IVPB (mini-bag)  2,000 mg IntraVENous Q8H    heparin (PF) 100 UNIT/ML injection 100

## 2024-06-16 NOTE — CARE COORDINATION
Transition of Care Plan:    RUR: 19%    Intensivist updated me that pt will be compassionately transitioned into comfort measures once more family have arrived this afternoon.        Sweta Swan RN

## 2024-06-16 NOTE — PROGRESS NOTES
Chart review. I was notified by RN Nurse Levi about patient Sujata Ernandez. Was notified that patient was actively dying. Patient has three kids, six grand children, and three great grand children. Patient is intubated currently. Patient has family members at bedside which included her two daughters: Rosalee & Valarie. Patient's family were experiencing anticipatory grief. Family were solemn, tearful, and supportive. Per family patient is at the EOL stage. Provided active listening, compassion, empathy, and ministry of presence. Provided peaceful/wellness prayers. Patient's family waiting for additional family members to arrive. Shared availability. Per family, extubation possibly at 1pm today. Patient's were grateful for the visitation and the spiritual support offered.     Devonte Mcgowan MDiv  Staff    [facility]  Paging Service [phone] ([place])

## 2024-06-16 NOTE — PROGRESS NOTES
0830- goals of care discussed with family and Dr Summesr. Decision made to proceed with current interventions until mor family arrives this afternoon, no escalation of care in the meantime. Plan for compassionate extubation ad comfort care today.   0840- Max dose on levophed, vasopressin and epinephrine. verbal order from Dr Summers to decrease BP to cycle q 30 mins and switch monitor to comfort mode for family. No new orders at this time  0900- No further suctioning/mouth care, turns or assessments to be completed per family request- anticipating comfort care upon additional family arrival.    0930- [facility], waiting for return call from [facility] to verify eligibility.  943- [facility] notified this RN that pt is not a candidate, requested call to confirm TOD. Otherwise, no further intervention from [facility]  1100- no nonessential medications (vasopressors and sedation) wanted by family, MD aware. Orders to be discontinued     1436- This RN, Levi Alcala RN, and Margareth Cabrera RT at bedside for compassionate extubation per family request. V-fib noted immediately post extubation   1439- asystole on monitor, no pulse or respirations noted by RN. MD aware.  1454- Dr Summers at bedside to confirm TOD.   1525- [facility]  home notified of patient passing. 1530- [facility] notified of TOD. [facility] by Shanika, awaiting call from eye bank. Pt not yet released to  home.   1545- Pt released from eye bank. Post mortem care completed by this RN and Levi Alcala, RN. Tahira and nursing supervisor notified.   1610- Pt off unit with security

## 2024-06-16 NOTE — PROGRESS NOTES
Chart review. I was notified that patient named Sujata Ernandez  by Charge RN Nurse. Provided follow up care/grief care for the family. The patient's was visited by their 's wife which brought support to the family. I provided compassion, empathy, and active listening. I sent my condolences and sympathies with the family members. The family communicated how much they appreciated the health care offered. Created space for the family to grieve and process what occurred. Checked in with medical. As attending , I communicated my availability to the family members. Family were thankful that I stopped by to check on them. Please contact spiritual health services for further assistance needed.    Devonte Mcgowan MDiv  Staff    [facility]  Paging Service [phone] ([place])

## 2024-06-16 NOTE — CONSULTS
Name: Sujata Ernandez MRN: [id number]   : 1945 Hospital: [facility]   Date: 2024        Impression Plan     Hypotension- septic shock  UTI  PE   SMA bleed/transverse colon s/p coiled  Encephalopathy  Hyperkalemia- resolved  Hypomagnesemia  Leukocytosis                 Full vent support.   Continue propofol/fentanyl for goal RASS of -1  Decompensation due to UTI and sepsis? Nuclear scan for bleed negative. Troponin negative, lactic acid negative- cardiac event and bowel ischemia low likelihood.   Continue Pressors, will not target goal MAP as we are using this as a bridge until family members can arrive. No further escalation once maxed on current pressors.  Continue cefepime/Vanc  Echo done- EF looks preserved. Waiting for official read  SCDs. Heparin proph held due to GI bleed  NPO  DNR    Spoke with the family. She has had a fluctuating course since arrival to the ICU, but has dramatically worsened overnight. There are several family members to arrive later this morning and will continue current regimen until they arrive with plans to transition to comfort measures later in the day. They do understand that she may die prior to this despite our best efforts.       Pt is critically ill. Critical care time spent with pt exclusive of procedures was 40 minutes    Radiology  ( personally reviewed) CT abdomen results reviewed   ABG Invalid input(s): \"PHI\", \"PO2I\", \"PCO2I\"       Subjective     This patient has been seen and evaluated at the request of Dr. Teran for hypotension.  Patient is a 79 y.o. female with PMHx of PAD who presenting with hypotension and acute GI bleed on . IR embolized branch of SMA. Pt hemodynamically stable until this afternoon when she became more somnolent and dropped her BP acutely.     Overnight events:   Pressors escalated again overnight. Levophed at 100, epi at 14, vaso 0.04.  RASS-4  Prop 5, Fentanyl 50      Past Medical History:   Diagnosis  Father     Stroke Mother     Thyroid Disease Daughter     Kidney Disease Son         kidney stone          Laboratory: I have personally reviewed the critical care flowsheet and labs.     Recent Labs     06/15/24  1638 06/15/24  2208 06/16/24  0334   WBC 13.4* 12.8* 10.9   HGB 8.8* 8.3* 7.7*   HCT 27.9* 26.5* 25.3*   * 131* 119*       Recent Labs     06/13/24  1916 06/14/24  0511 06/14/24  0848 06/14/24  1355 06/15/24  0459 06/15/24  1815 06/16/24  0334    139  --  142 143 144 144   K 4.9 5.3*  --  5.6* 4.1 4.8 6.8*    110*  --  116* 119* 120* 119*   CO2 25 25  --  23 18* 14* 11*   BUN 17 18  --  21* 21* 30* 34*   MG  --   --  1.4*  --  1.3*  --   --    PHOS  --   --  3.8  --  2.9  --   --    ALT 16 15  --   --  21 466*  463*  --    INR 1.1  --   --  1.2*  --   --   --          Objective:     Mode Rate Tidal Volume Pressure FiO2 PEEP                    Vital Signs:     TMAX(24)      Intake/Output:   Last shift:         Last 3 shifts:   06/16 0701 - 06/16 1900  In: 448.3 [I.V.:359.3]  Out: - RRIOLAST3    Intake/Output Summary (Last 24 hours) at 6/16/2024 0839  Last data filed at 6/16/2024 0833  Gross per 24 hour   Intake 4990.94 ml   Output 190 ml   Net 4800.94 ml       EXAM:   GENERAL: RASS -4, HEENT:  PERRL, EOMI, no alar flaring or epistaxis, ETT NECK:  no jugular vein distention, no retractions, no thyromegaly or masses, LUNGS: CTA, no w/r/r, HEART:  Regular rate and rhythm with no MGR; +1 edema is present in all ext, ABDOMEN: obese, soft with no tenderness, bowel sounds difficult to assess, EXTREMITIES:  warm with no cyanosis, SKIN: ecchymosis in lower and upper ext NEUROLOGIC:  RASS -4    Mary Summers MD

## 2024-06-16 NOTE — PROGRESS NOTES
Rt unable to obtain ABG sample due to poor perfusion and low BP. VBG obtained, bedside nurse notified.

## 2024-06-16 NOTE — PROGRESS NOTES
ABG ordered to verify SpO2 , SpO2 probe unable to trace. RT unable to get sample due to poor perfusion and low BP. Bedside nurse notified, VBG obtained.

## 2024-06-16 NOTE — DISCHARGE SUMMARY
Physician Discharge Summary     Patient ID:  Sujata Ernandez  [id number]  79 y.o.  1945    Admit date: 6/13/2024    Discharge date of service and time: 6/16/2024  Greater than 30 minutes were spent providing discharge related services for this patient    Admission Diagnoses: Hemorrhagic shock (HCC) [R57.8]  GI (gastrointestinal hemorrhage) [K92.2]  Acute GI bleeding [K92.2]  Gastrointestinal hemorrhage, unspecified gastrointestinal hemorrhage type [K92.2]    Discharge Diagnoses:    Principal Diagnosis   Acute GI bleeding                                             Hospital Course and other diagnoses  Acute GI bleeding / Gastrointestinal hemorrhage / Chronic colitis - Followed by GI, has been on budesonide, colestipol and pancreatic enzymes.  ER consulted IR who found active hemorrhage in the proximal transverse colon, and did an embolization on June 13. She had fatal consequences of ischemia. Lactic acid up to >12 despite all measures.  Was on cefepime, cipro and flagyl.  Family opted to withdraw care for comfort measures.     Anemia / Thrombocytopenia - POA and worse than baseline due to acute bleeding, and will worsen with hydration. I now suspect DIC.     Acute renal failure / Hyperkalemia / Acute metabolic acidosis - Much worse in last 24hr.  Not a RRT candidate. Gave Bicarb and NS as IVF and follow     Shock / Hypotension - POA, DDX hemorrhagic, septic, hypovolemic. Blood loss, UTI, dehydration and polypharmacy may contribute.  Hydrated but worsened.  Shock persisted despite solucortef, levophed and epinephrine drip.      Acute hypoxic respiratory failure - Worsened due to shock.  Intubated June 14.  Vent management per intensivist. Sedation with fentanyl and propofol. Doing poorly.     Shock liver - Due to above, acutely.  LFTS sharply up     UTI - POA suggested by UA.  Continue cefepime     Acute Pulmonary embolism - Noted on scan, but small and unlikely contributing much to hypoxia or shock.

## 2024-06-16 NOTE — FLOWSHEET NOTE
2304 Lactic acid 7.2 ,  ml iv bolus started.  2320 Pt hypotensive, levophed titrated up ,   2336 Unable to get O2 sat , VBG drawn , Bicarb 150 meq iv given.  2348 Bicarb drip started.  0346 BP 62/48  0353  ml iv bolus started.  0418 Epinephrine drip started  0428 Informed intensivist critical results  0453 Bicarb 150 meq iv given.  0501 Calcium gluconate 1 gm iv over 1 hr started.  0600 Family at bedside.

## 2024-06-16 NOTE — PLAN OF CARE
Problem: Safety - Medical Restraint  Goal: Remains free of injury from restraints (Restraint for Interference with Medical Device)  Description: INTERVENTIONS:  1. Determine that other, less restrictive measures have been tried or would not be effective before applying the restraint  2. Evaluate the patient's condition at the time of restraint application  3. Inform patient/family regarding the reason for restraint  4. Q2H: Monitor safety, psychosocial status, comfort, nutrition and hydration  6/16/2024 0958 by Yudy Rendon RN  Outcome: Completed  Flowsheets  Taken 6/16/2024 0800 by Yudy Rendon RN  Remains free of injury from restraints (restraint for interference with medical device):   Determine that other, less restrictive measures have been tried or would not be effective before applying the restraint   Evaluate the patient's condition at the time of restraint application   Inform patient/family regarding the reason for restraint   Every 2 hours: Monitor safety, psychosocial status, comfort, nutrition and hydration  Taken 6/16/2024 0400 by Chula Rodriguez RN  Remains free of injury from restraints (restraint for interference with medical device): Every 2 hours: Monitor safety, psychosocial status, comfort, nutrition and hydration  6/16/2024 0053 by Elizabeth Castellano, RN  Outcome: Progressing

## 2024-06-16 NOTE — PROGRESS NOTES
Patient noted to be in asystole on the monitor. Examined and with absent breath/cardiac sounds and reflexes. Time of death 1439.

## 2024-06-17 LAB
BACTERIA SPEC CULT: ABNORMAL
CC UR VC: ABNORMAL
ECHO BSA: 1.86 M2
SERVICE CMNT-IMP: ABNORMAL

## 2024-06-20 NOTE — PROGRESS NOTES
Physician Progress Note      PATIENT:               TISHA MASSEY  CSN #:                  [id number]  :                       1945  ADMIT DATE:       2024 6:59 PM  DISCH DATE:        2024 4:10 PM  RESPONDING  PROVIDER #:        Wilver Silva MD          QUERY TEXT:    Pt admitted with GI Bleed and has shock documented.  If possible, please   document in progress notes and discharge summary further specificity regarding   the type of shock:    The medical record reflects the following:  Risk Factors: shock    Clinical Indicators:  D/C summary   Admission Diagnoses: Hemorrhagic shock  Shock / Hypotension - POA, DDX hemorrhagic, septic, hypovolemic. Blood loss,   UTI, dehydration and polypharmacy may contribute.  Hydrated but worsened.    Shock persisted despite solucortef, levophed and epinephrine drip.    lactic acid 3.0  2.0  1.2  5.3  4.5  7.2  12.3  BUN 17-34  CR  0.44-1.18  wbc 11.5  12.9 19.5  21.2  19.5  16.1  15.3  13.4  12.8  10.9  hgb 9.7  10.3  10.9  9.7  8.9  9.0  8.8  8.9  8.8  8.3  7.7  plt 194  140 124  158  132  128  115  135  132  131  119      HR   BP 63//76    Treatment:  0.9 % sodium chloride infusion Rate: 100 mL/hr  ceFEPIme (MAXIPIME) 2,000 mg in sterile water 20 mL IV syringe  ciprofloxacin (CIPRO) IVPB 400 mg  EPINEPHrine 10,000 mcg in sodium chloride 0.9 % 250 mL infusion  lactated ringers bolus 500 mL x4  lactated ringers IV soln infusion at 50  metroNIDAZOLE (FLAGYL) 500 mg in 0.9% NaCl 100 mL IVPB premix  norepinephrine (LEVOPHED) 16 mg in sodium chloride 0.9 % 250 mL infusion  vancomycin (VANCOCIN) 1,000 mg in sodium chloride 0.9 % 250 mL IVPB  VASOpressin 20 Units in sodium chloride 0.9 % 100 mL infusion    Thank you,  Ghada Guzmán RN CDI  CRCR  Clinical Documentation  [phone] or via [employer]  [email or web address]  Options provided:  -- Hemorrhagic Shock  -- Septic Shock  -- Hypovolemic Shock  -- Other - I will add my own  diagnosis  -- Disagree - Not applicable / Not valid  -- Disagree - Clinically unable to determine / Unknown  -- Refer to Clinical Documentation Reviewer    PROVIDER RESPONSE TEXT:    Combinaiton hemorrhagic, septic, hypovolemic    Query created by: Ghada Guzmán on 6/18/2024 1:04 PM      Electronically signed by:  Wilver Silva MD 6/19/2024 10:44 PM